# Patient Record
Sex: FEMALE | Race: WHITE | NOT HISPANIC OR LATINO | Employment: FULL TIME | URBAN - METROPOLITAN AREA
[De-identification: names, ages, dates, MRNs, and addresses within clinical notes are randomized per-mention and may not be internally consistent; named-entity substitution may affect disease eponyms.]

---

## 2017-12-24 ENCOUNTER — HOSPITAL ENCOUNTER (EMERGENCY)
Facility: HOSPITAL | Age: 52
Discharge: HOME | End: 2017-12-25
Attending: EMERGENCY MEDICINE | Admitting: EMERGENCY MEDICINE
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
## 2017-12-24 DIAGNOSIS — S80.11XA CONTUSION OF RIGHT TIBIA: ICD-10-CM

## 2017-12-24 DIAGNOSIS — S16.1XXA CERVICAL STRAIN, ACUTE, INITIAL ENCOUNTER: Primary | ICD-10-CM

## 2017-12-24 DIAGNOSIS — S29.019A ACUTE THORACIC MYOFASCIAL STRAIN, INITIAL ENCOUNTER: ICD-10-CM

## 2017-12-24 PROCEDURE — 99283 EMERGENCY DEPT VISIT LOW MDM: CPT

## 2017-12-24 RX ORDER — DESVENLAFAXINE 100 MG/1
100 TABLET, EXTENDED RELEASE ORAL EVERY MORNING
COMMUNITY

## 2017-12-25 ENCOUNTER — APPOINTMENT (OUTPATIENT)
Dept: GENERAL RADIOLOGY | Facility: HOSPITAL | Age: 52
End: 2017-12-25

## 2017-12-25 VITALS
SYSTOLIC BLOOD PRESSURE: 177 MMHG | HEIGHT: 65 IN | TEMPERATURE: 97.9 F | WEIGHT: 125 LBS | RESPIRATION RATE: 16 BRPM | OXYGEN SATURATION: 97 % | DIASTOLIC BLOOD PRESSURE: 111 MMHG | BODY MASS INDEX: 20.83 KG/M2 | HEART RATE: 92 BPM

## 2017-12-25 PROCEDURE — 72072 X-RAY EXAM THORAC SPINE 3VWS: CPT

## 2017-12-25 PROCEDURE — 72040 X-RAY EXAM NECK SPINE 2-3 VW: CPT

## 2022-08-30 ENCOUNTER — APPOINTMENT (OUTPATIENT)
Dept: CARDIOLOGY | Facility: HOSPITAL | Age: 57
End: 2022-08-30

## 2022-08-30 ENCOUNTER — APPOINTMENT (OUTPATIENT)
Dept: CT IMAGING | Facility: HOSPITAL | Age: 57
End: 2022-08-30

## 2022-08-30 ENCOUNTER — APPOINTMENT (OUTPATIENT)
Dept: GENERAL RADIOLOGY | Facility: HOSPITAL | Age: 57
End: 2022-08-30

## 2022-08-30 ENCOUNTER — HOSPITAL ENCOUNTER (INPATIENT)
Facility: HOSPITAL | Age: 57
LOS: 4 days | Discharge: HOME | End: 2022-09-03
Attending: EMERGENCY MEDICINE | Admitting: INTERNAL MEDICINE

## 2022-08-30 ENCOUNTER — APPOINTMENT (OUTPATIENT)
Dept: ULTRASOUND IMAGING | Facility: HOSPITAL | Age: 57
End: 2022-08-30

## 2022-08-30 DIAGNOSIS — R53.83 FATIGUE, UNSPECIFIED TYPE: ICD-10-CM

## 2022-08-30 DIAGNOSIS — R74.8 ELEVATED LIVER ENZYMES: ICD-10-CM

## 2022-08-30 DIAGNOSIS — I21.4 NSTEMI (NON-ST ELEVATED MYOCARDIAL INFARCTION): ICD-10-CM

## 2022-08-30 DIAGNOSIS — I50.23 ACUTE ON CHRONIC SYSTOLIC CONGESTIVE HEART FAILURE: Primary | ICD-10-CM

## 2022-08-30 DIAGNOSIS — I50.9 ACUTE CONGESTIVE HEART FAILURE, UNSPECIFIED HEART FAILURE TYPE: ICD-10-CM

## 2022-08-30 LAB
ALBUMIN SERPL-MCNC: 3.9 G/DL (ref 3.5–5.2)
ALBUMIN/GLOB SERPL: 1.4 G/DL
ALP SERPL-CCNC: 170 U/L (ref 39–117)
ALT SERPL W P-5'-P-CCNC: 42 U/L (ref 1–33)
ANION GAP SERPL CALCULATED.3IONS-SCNC: 15 MMOL/L (ref 5–15)
ANISOCYTOSIS BLD QL: NORMAL
ASCENDING AORTA: 2.5 CM
AST SERPL-CCNC: 75 U/L (ref 1–32)
B PARAPERT DNA SPEC QL NAA+PROBE: NOT DETECTED
B PERT DNA SPEC QL NAA+PROBE: NOT DETECTED
BASOPHILS # BLD AUTO: 0.16 10*3/MM3 (ref 0–0.2)
BASOPHILS NFR BLD AUTO: 0.9 % (ref 0–1.5)
BH CV ECHO MEAS - AI P1/2T: 466.1 MSEC
BH CV ECHO MEAS - AO ROOT DIAM: 2.6 CM
BH CV ECHO MEAS - EDV(CUBED): 227 ML
BH CV ECHO MEAS - EDV(MOD-SP2): 170 ML
BH CV ECHO MEAS - EDV(MOD-SP4): 175 ML
BH CV ECHO MEAS - EF(MOD-BP): 33.9 %
BH CV ECHO MEAS - EF(MOD-SP2): 31.8 %
BH CV ECHO MEAS - EF(MOD-SP4): 41.1 %
BH CV ECHO MEAS - ESV(CUBED): 166.4 ML
BH CV ECHO MEAS - ESV(MOD-SP2): 116 ML
BH CV ECHO MEAS - ESV(MOD-SP4): 103 ML
BH CV ECHO MEAS - FS: 9.8 %
BH CV ECHO MEAS - IVS/LVPW: 1 CM
BH CV ECHO MEAS - IVSD: 0.8 CM
BH CV ECHO MEAS - LA DIMENSION: 4.4 CM
BH CV ECHO MEAS - LAT PEAK E' VEL: 4.1 CM/SEC
BH CV ECHO MEAS - LV MASS(C)D: 191.6 GRAMS
BH CV ECHO MEAS - LV MAX PG: 1.5 MMHG
BH CV ECHO MEAS - LV MEAN PG: 1 MMHG
BH CV ECHO MEAS - LV V1 MAX: 61.3 CM/SEC
BH CV ECHO MEAS - LV V1 VTI: 8.5 CM
BH CV ECHO MEAS - LVIDD: 6.1 CM
BH CV ECHO MEAS - LVIDS: 5.5 CM
BH CV ECHO MEAS - LVOT AREA: 2.5 CM2
BH CV ECHO MEAS - LVOT DIAM: 1.8 CM
BH CV ECHO MEAS - LVPWD: 0.8 CM
BH CV ECHO MEAS - MED PEAK E' VEL: 3.2 CM/SEC
BH CV ECHO MEAS - MV A MAX VEL: 80.2 CM/SEC
BH CV ECHO MEAS - MV DEC SLOPE: 382 CM/SEC2
BH CV ECHO MEAS - MV E MAX VEL: 66 CM/SEC
BH CV ECHO MEAS - MV E/A: 0.82
BH CV ECHO MEAS - MV MAX PG: 2.41 MMHG
BH CV ECHO MEAS - MV MEAN PG: 1 MMHG
BH CV ECHO MEAS - MV P1/2T: 63.9 MSEC
BH CV ECHO MEAS - MV V2 VTI: 10.5 CM
BH CV ECHO MEAS - MVA(P1/2T): 3.4 CM2
BH CV ECHO MEAS - MVA(VTI): 2.05 CM2
BH CV ECHO MEAS - PA ACC TIME: 0.1 SEC
BH CV ECHO MEAS - PA PR(ACCEL): 36.3 MMHG
BH CV ECHO MEAS - PI END-D VEL: 168 CM/SEC
BH CV ECHO MEAS - SV(LVOT): 21.5 ML
BH CV ECHO MEAS - SV(MOD-SP2): 54 ML
BH CV ECHO MEAS - SV(MOD-SP4): 72 ML
BH CV ECHO MEAS - TAPSE (>1.6): 1.11 CM
BH CV ECHO MEASUREMENTS AVERAGE E/E' RATIO: 18.08
BH CV VAS BP LEFT ARM: NORMAL MMHG
BH CV XLRA - RV BASE: 4 CM
BH CV XLRA - RV LENGTH: 5.1 CM
BH CV XLRA - RV MID: 2.5 CM
BH CV XLRA - TDI S': 9.1 CM/SEC
BILIRUB SERPL-MCNC: 1.5 MG/DL (ref 0–1.2)
BILIRUB UR QL STRIP: NEGATIVE
BUN SERPL-MCNC: 13 MG/DL (ref 6–20)
BUN/CREAT SERPL: 11.3 (ref 7–25)
C PNEUM DNA NPH QL NAA+NON-PROBE: NOT DETECTED
CALCIUM SPEC-SCNC: 8.9 MG/DL (ref 8.6–10.5)
CHLORIDE SERPL-SCNC: 102 MMOL/L (ref 98–107)
CLARITY UR: CLEAR
CO2 SERPL-SCNC: 23 MMOL/L (ref 22–29)
COLOR UR: YELLOW
CREAT BLDA-MCNC: 1.2 MG/DL
CREAT BLDA-MCNC: 1.2 MG/DL (ref 0.6–1.3)
CREAT SERPL-MCNC: 1.15 MG/DL (ref 0.57–1)
D-LACTATE SERPL-SCNC: 1.8 MMOL/L (ref 0.5–2)
DEPRECATED RDW RBC AUTO: 60.1 FL (ref 37–54)
EGFRCR SERPLBLD CKD-EPI 2021: 55.7 ML/MIN/1.73
EOSINOPHIL # BLD AUTO: 0.19 10*3/MM3 (ref 0–0.4)
EOSINOPHIL NFR BLD AUTO: 1 % (ref 0.3–6.2)
ERYTHROCYTE [DISTWIDTH] IN BLOOD BY AUTOMATED COUNT: 20.6 % (ref 12.3–15.4)
FLUAV SUBTYP SPEC NAA+PROBE: NOT DETECTED
FLUBV RNA ISLT QL NAA+PROBE: NOT DETECTED
GLOBULIN UR ELPH-MCNC: 2.7 GM/DL
GLUCOSE SERPL-MCNC: 127 MG/DL (ref 65–99)
GLUCOSE UR STRIP-MCNC: NEGATIVE MG/DL
HADV DNA SPEC NAA+PROBE: NOT DETECTED
HCOV 229E RNA SPEC QL NAA+PROBE: NOT DETECTED
HCOV HKU1 RNA SPEC QL NAA+PROBE: NOT DETECTED
HCOV NL63 RNA SPEC QL NAA+PROBE: NOT DETECTED
HCOV OC43 RNA SPEC QL NAA+PROBE: NOT DETECTED
HCT VFR BLD AUTO: 40 % (ref 34–46.6)
HGB BLD-MCNC: 13.2 G/DL (ref 12–15.9)
HGB UR QL STRIP.AUTO: NEGATIVE
HMPV RNA NPH QL NAA+NON-PROBE: NOT DETECTED
HOLD SPECIMEN: NORMAL
HPIV1 RNA ISLT QL NAA+PROBE: NOT DETECTED
HPIV2 RNA SPEC QL NAA+PROBE: NOT DETECTED
HPIV3 RNA NPH QL NAA+PROBE: NOT DETECTED
HPIV4 P GENE NPH QL NAA+PROBE: NOT DETECTED
IMM GRANULOCYTES # BLD AUTO: 0.2 10*3/MM3 (ref 0–0.05)
IMM GRANULOCYTES NFR BLD AUTO: 1.1 % (ref 0–0.5)
KETONES UR QL STRIP: NEGATIVE
LEFT ATRIUM VOLUME INDEX: 38 ML/M2
LEUKOCYTE ESTERASE UR QL STRIP.AUTO: NEGATIVE
LV EF 2D ECHO EST: 25 %
LYMPHOCYTES # BLD AUTO: 2.01 10*3/MM3 (ref 0.7–3.1)
LYMPHOCYTES NFR BLD AUTO: 10.9 % (ref 19.6–45.3)
M PNEUMO IGG SER IA-ACNC: NOT DETECTED
MAXIMAL PREDICTED HEART RATE: 163 BPM
MCH RBC QN AUTO: 27.6 PG (ref 26.6–33)
MCHC RBC AUTO-ENTMCNC: 33 G/DL (ref 31.5–35.7)
MCV RBC AUTO: 83.5 FL (ref 79–97)
MONOCYTES # BLD AUTO: 1.11 10*3/MM3 (ref 0.1–0.9)
MONOCYTES NFR BLD AUTO: 6 % (ref 5–12)
NEUTROPHILS NFR BLD AUTO: 14.7 10*3/MM3 (ref 1.7–7)
NEUTROPHILS NFR BLD AUTO: 80.1 % (ref 42.7–76)
NITRITE UR QL STRIP: NEGATIVE
NRBC BLD AUTO-RTO: 1.1 /100 WBC (ref 0–0.2)
NT-PROBNP SERPL-MCNC: ABNORMAL PG/ML (ref 0–900)
OVALOCYTES BLD QL SMEAR: NORMAL
PH UR STRIP.AUTO: 6 [PH] (ref 5–8)
PLAT MORPH BLD: NORMAL
PLATELET # BLD AUTO: 504 10*3/MM3 (ref 140–450)
PMV BLD AUTO: 11.2 FL (ref 6–12)
POLYCHROMASIA BLD QL SMEAR: NORMAL
POTASSIUM SERPL-SCNC: 4.1 MMOL/L (ref 3.5–5.2)
PROCALCITONIN SERPL-MCNC: 0.05 NG/ML (ref 0–0.25)
PROT SERPL-MCNC: 6.6 G/DL (ref 6–8.5)
PROT UR QL STRIP: ABNORMAL
RBC # BLD AUTO: 4.79 10*6/MM3 (ref 3.77–5.28)
RHINOVIRUS RNA SPEC NAA+PROBE: NOT DETECTED
RSV RNA NPH QL NAA+NON-PROBE: NOT DETECTED
SARS-COV-2 RNA NPH QL NAA+NON-PROBE: NOT DETECTED
SODIUM SERPL-SCNC: 140 MMOL/L (ref 136–145)
SP GR UR STRIP: 1.04 (ref 1–1.03)
STRESS TARGET HR: 139 BPM
TROPONIN T SERPL-MCNC: 0.55 NG/ML (ref 0–0.03)
TSH SERPL DL<=0.05 MIU/L-ACNC: 3.53 UIU/ML (ref 0.27–4.2)
UROBILINOGEN UR QL STRIP: ABNORMAL
VIT B12 BLD-MCNC: 521 PG/ML (ref 211–946)
WBC MORPH BLD: NORMAL
WBC NRBC COR # BLD: 18.37 10*3/MM3 (ref 3.4–10.8)
WHOLE BLOOD HOLD COAG: NORMAL
WHOLE BLOOD HOLD SPECIMEN: NORMAL

## 2022-08-30 PROCEDURE — 99223 1ST HOSP IP/OBS HIGH 75: CPT | Performed by: INTERNAL MEDICINE

## 2022-08-30 PROCEDURE — 82607 VITAMIN B-12: CPT | Performed by: INTERNAL MEDICINE

## 2022-08-30 PROCEDURE — 84484 ASSAY OF TROPONIN QUANT: CPT

## 2022-08-30 PROCEDURE — 76705 ECHO EXAM OF ABDOMEN: CPT

## 2022-08-30 PROCEDURE — 84145 PROCALCITONIN (PCT): CPT | Performed by: EMERGENCY MEDICINE

## 2022-08-30 PROCEDURE — 74177 CT ABD & PELVIS W/CONTRAST: CPT

## 2022-08-30 PROCEDURE — 81003 URINALYSIS AUTO W/O SCOPE: CPT | Performed by: INTERNAL MEDICINE

## 2022-08-30 PROCEDURE — 99222 1ST HOSP IP/OBS MODERATE 55: CPT | Performed by: INTERNAL MEDICINE

## 2022-08-30 PROCEDURE — 0 IOPAMIDOL PER 1 ML: Performed by: EMERGENCY MEDICINE

## 2022-08-30 PROCEDURE — 93005 ELECTROCARDIOGRAM TRACING: CPT

## 2022-08-30 PROCEDURE — 93306 TTE W/DOPPLER COMPLETE: CPT | Performed by: INTERNAL MEDICINE

## 2022-08-30 PROCEDURE — 82565 ASSAY OF CREATININE: CPT

## 2022-08-30 PROCEDURE — 87040 BLOOD CULTURE FOR BACTERIA: CPT | Performed by: EMERGENCY MEDICINE

## 2022-08-30 PROCEDURE — 85007 BL SMEAR W/DIFF WBC COUNT: CPT

## 2022-08-30 PROCEDURE — 76981 USE PARENCHYMA: CPT

## 2022-08-30 PROCEDURE — 83605 ASSAY OF LACTIC ACID: CPT | Performed by: EMERGENCY MEDICINE

## 2022-08-30 PROCEDURE — 0202U NFCT DS 22 TRGT SARS-COV-2: CPT | Performed by: EMERGENCY MEDICINE

## 2022-08-30 PROCEDURE — 25010000002 SULFUR HEXAFLUORIDE MICROSPH 60.7-25 MG RECONSTITUTED SUSPENSION: Performed by: NURSE PRACTITIONER

## 2022-08-30 PROCEDURE — 93306 TTE W/DOPPLER COMPLETE: CPT

## 2022-08-30 PROCEDURE — 71275 CT ANGIOGRAPHY CHEST: CPT

## 2022-08-30 PROCEDURE — 83880 ASSAY OF NATRIURETIC PEPTIDE: CPT

## 2022-08-30 PROCEDURE — 71045 X-RAY EXAM CHEST 1 VIEW: CPT

## 2022-08-30 PROCEDURE — 80050 GENERAL HEALTH PANEL: CPT

## 2022-08-30 PROCEDURE — 99285 EMERGENCY DEPT VISIT HI MDM: CPT

## 2022-08-30 RX ORDER — NICOTINE 21 MG/24HR
1 PATCH, TRANSDERMAL 24 HOURS TRANSDERMAL DAILY
Status: DISCONTINUED | OUTPATIENT
Start: 2022-08-30 | End: 2022-09-03 | Stop reason: HOSPADM

## 2022-08-30 RX ORDER — VALSARTAN 80 MG/1
80 TABLET ORAL
Status: DISCONTINUED | OUTPATIENT
Start: 2022-08-30 | End: 2022-08-30

## 2022-08-30 RX ORDER — BUMETANIDE 0.25 MG/ML
1 INJECTION INTRAMUSCULAR; INTRAVENOUS EVERY 12 HOURS
Status: DISCONTINUED | OUTPATIENT
Start: 2022-08-30 | End: 2022-09-02

## 2022-08-30 RX ORDER — SODIUM CHLORIDE 0.9 % (FLUSH) 0.9 %
10 SYRINGE (ML) INJECTION AS NEEDED
Status: DISCONTINUED | OUTPATIENT
Start: 2022-08-30 | End: 2022-09-03 | Stop reason: HOSPADM

## 2022-08-30 RX ORDER — VALSARTAN 80 MG/1
40 TABLET ORAL EVERY 12 HOURS SCHEDULED
Status: DISCONTINUED | OUTPATIENT
Start: 2022-08-30 | End: 2022-09-03 | Stop reason: HOSPADM

## 2022-08-30 RX ORDER — SODIUM CHLORIDE 0.9 % (FLUSH) 0.9 %
10 SYRINGE (ML) INJECTION EVERY 12 HOURS SCHEDULED
Status: DISCONTINUED | OUTPATIENT
Start: 2022-08-30 | End: 2022-09-03 | Stop reason: HOSPADM

## 2022-08-30 RX ORDER — ENOXAPARIN SODIUM 100 MG/ML
40 INJECTION SUBCUTANEOUS DAILY
Status: DISCONTINUED | OUTPATIENT
Start: 2022-08-30 | End: 2022-09-03

## 2022-08-30 RX ORDER — IBUPROFEN 200 MG
400 TABLET ORAL EVERY 6 HOURS PRN
COMMUNITY
End: 2022-09-03 | Stop reason: HOSPADM

## 2022-08-30 RX ORDER — DESVENLAFAXINE SUCCINATE 50 MG/1
50 TABLET, EXTENDED RELEASE ORAL DAILY
Status: DISCONTINUED | OUTPATIENT
Start: 2022-08-30 | End: 2022-09-03 | Stop reason: HOSPADM

## 2022-08-30 RX ORDER — CARVEDILOL 6.25 MG/1
6.25 TABLET ORAL 2 TIMES DAILY WITH MEALS
Status: DISCONTINUED | OUTPATIENT
Start: 2022-08-30 | End: 2022-09-03 | Stop reason: HOSPADM

## 2022-08-30 RX ORDER — ACETAMINOPHEN 325 MG/1
650 TABLET ORAL EVERY 8 HOURS PRN
Status: DISCONTINUED | OUTPATIENT
Start: 2022-08-30 | End: 2022-09-03 | Stop reason: HOSPADM

## 2022-08-30 RX ORDER — DICLOFENAC SODIUM 75 MG/1
75 TABLET, DELAYED RELEASE ORAL 2 TIMES DAILY
COMMUNITY
End: 2022-09-03 | Stop reason: HOSPADM

## 2022-08-30 RX ORDER — METOLAZONE 5 MG/1
5 TABLET ORAL ONCE
Status: COMPLETED | OUTPATIENT
Start: 2022-08-30 | End: 2022-08-30

## 2022-08-30 RX ADMIN — CARVEDILOL 6.25 MG: 6.25 TABLET, FILM COATED ORAL at 18:16

## 2022-08-30 RX ADMIN — IOPAMIDOL 85 ML: 755 INJECTION, SOLUTION INTRAVENOUS at 08:38

## 2022-08-30 RX ADMIN — METOLAZONE 5 MG: 5 TABLET ORAL at 11:37

## 2022-08-30 RX ADMIN — BUMETANIDE 1 MG: 0.25 INJECTION INTRAMUSCULAR; INTRAVENOUS at 21:07

## 2022-08-30 RX ADMIN — SULFUR HEXAFLUORIDE 3 ML: KIT at 12:24

## 2022-08-30 RX ADMIN — Medication 1 PATCH: at 13:32

## 2022-08-30 RX ADMIN — VALSARTAN 40 MG: 80 TABLET, FILM COATED ORAL at 12:20

## 2022-08-30 RX ADMIN — Medication 10 ML: at 21:07

## 2022-08-30 RX ADMIN — BUMETANIDE 1 MG: 0.25 INJECTION INTRAMUSCULAR; INTRAVENOUS at 11:37

## 2022-08-30 RX ADMIN — CARVEDILOL 6.25 MG: 6.25 TABLET, FILM COATED ORAL at 11:37

## 2022-08-30 RX ADMIN — Medication 10 ML: at 12:21

## 2022-08-31 ENCOUNTER — APPOINTMENT (OUTPATIENT)
Dept: GENERAL RADIOLOGY | Facility: HOSPITAL | Age: 57
End: 2022-08-31

## 2022-08-31 PROBLEM — I50.41 ACUTE COMBINED SYSTOLIC (CONGESTIVE) AND DIASTOLIC (CONGESTIVE) HEART FAILURE: Status: ACTIVE | Noted: 2022-08-31

## 2022-08-31 PROBLEM — R79.89 ELEVATED TROPONIN: Status: ACTIVE | Noted: 2022-08-31

## 2022-08-31 PROBLEM — R77.8 ELEVATED TROPONIN: Status: ACTIVE | Noted: 2022-08-31

## 2022-08-31 PROBLEM — N28.9 RENAL INSUFFICIENCY: Status: ACTIVE | Noted: 2022-08-31

## 2022-08-31 PROBLEM — R63.4 UNINTENTIONAL WEIGHT LOSS: Status: ACTIVE | Noted: 2022-08-31

## 2022-08-31 PROBLEM — R79.89 ELEVATED LFTS: Status: ACTIVE | Noted: 2022-08-31

## 2022-08-31 PROBLEM — Z72.0 TOBACCO USE: Status: ACTIVE | Noted: 2022-08-31

## 2022-08-31 PROBLEM — D72.829 LEUKOCYTOSIS: Status: ACTIVE | Noted: 2022-08-31

## 2022-08-31 LAB
ALBUMIN SERPL-MCNC: 3.4 G/DL (ref 3.5–5.2)
ALBUMIN/GLOB SERPL: 1.4 G/DL
ALP SERPL-CCNC: 147 U/L (ref 39–117)
ALT SERPL W P-5'-P-CCNC: 37 U/L (ref 1–33)
ANION GAP SERPL CALCULATED.3IONS-SCNC: 9 MMOL/L (ref 5–15)
AST SERPL-CCNC: 90 U/L (ref 1–32)
BILIRUB SERPL-MCNC: 0.9 MG/DL (ref 0–1.2)
BUN SERPL-MCNC: 18 MG/DL (ref 6–20)
BUN/CREAT SERPL: 13.6 (ref 7–25)
CALCIUM SPEC-SCNC: 8.4 MG/DL (ref 8.6–10.5)
CHLORIDE SERPL-SCNC: 101 MMOL/L (ref 98–107)
CHOLEST SERPL-MCNC: 112 MG/DL (ref 0–200)
CO2 SERPL-SCNC: 33 MMOL/L (ref 22–29)
CREAT SERPL-MCNC: 1.32 MG/DL (ref 0.57–1)
DEPRECATED RDW RBC AUTO: 59.1 FL (ref 37–54)
EGFRCR SERPLBLD CKD-EPI 2021: 47.2 ML/MIN/1.73
ERYTHROCYTE [DISTWIDTH] IN BLOOD BY AUTOMATED COUNT: 20.6 % (ref 12.3–15.4)
GLOBULIN UR ELPH-MCNC: 2.4 GM/DL
GLUCOSE SERPL-MCNC: 107 MG/DL (ref 65–99)
HBA1C MFR BLD: 5.7 % (ref 4.8–5.6)
HCT VFR BLD AUTO: 38.6 % (ref 34–46.6)
HDLC SERPL-MCNC: 30 MG/DL (ref 40–60)
HGB BLD-MCNC: 12.4 G/DL (ref 12–15.9)
LDLC SERPL CALC-MCNC: 66 MG/DL (ref 0–100)
LDLC/HDLC SERPL: 2.19 {RATIO}
MAGNESIUM SERPL-MCNC: 1.7 MG/DL (ref 1.6–2.6)
MAGNESIUM SERPL-MCNC: 1.8 MG/DL (ref 1.6–2.6)
MCH RBC QN AUTO: 26.8 PG (ref 26.6–33)
MCHC RBC AUTO-ENTMCNC: 32.1 G/DL (ref 31.5–35.7)
MCV RBC AUTO: 83.5 FL (ref 79–97)
PLATELET # BLD AUTO: 403 10*3/MM3 (ref 140–450)
PMV BLD AUTO: 11.6 FL (ref 6–12)
POTASSIUM SERPL-SCNC: 3.4 MMOL/L (ref 3.5–5.2)
PROT SERPL-MCNC: 5.8 G/DL (ref 6–8.5)
QT INTERVAL: 472 MS
QTC INTERVAL: 490 MS
RBC # BLD AUTO: 4.62 10*6/MM3 (ref 3.77–5.28)
SODIUM SERPL-SCNC: 143 MMOL/L (ref 136–145)
TRIGL SERPL-MCNC: 82 MG/DL (ref 0–150)
TROPONIN T SERPL-MCNC: 0.98 NG/ML (ref 0–0.03)
TSH SERPL DL<=0.05 MIU/L-ACNC: 1.38 UIU/ML (ref 0.27–4.2)
VLDLC SERPL-MCNC: 16 MG/DL (ref 5–40)
WBC NRBC COR # BLD: 17.27 10*3/MM3 (ref 3.4–10.8)

## 2022-08-31 PROCEDURE — 80050 GENERAL HEALTH PANEL: CPT | Performed by: INTERNAL MEDICINE

## 2022-08-31 PROCEDURE — 83036 HEMOGLOBIN GLYCOSYLATED A1C: CPT | Performed by: INTERNAL MEDICINE

## 2022-08-31 PROCEDURE — 83735 ASSAY OF MAGNESIUM: CPT | Performed by: INTERNAL MEDICINE

## 2022-08-31 PROCEDURE — 93005 ELECTROCARDIOGRAM TRACING: CPT | Performed by: NURSE PRACTITIONER

## 2022-08-31 PROCEDURE — 80061 LIPID PANEL: CPT | Performed by: INTERNAL MEDICINE

## 2022-08-31 PROCEDURE — 84484 ASSAY OF TROPONIN QUANT: CPT | Performed by: NURSE PRACTITIONER

## 2022-08-31 PROCEDURE — 99232 SBSQ HOSP IP/OBS MODERATE 35: CPT | Performed by: INTERNAL MEDICINE

## 2022-08-31 PROCEDURE — 83735 ASSAY OF MAGNESIUM: CPT | Performed by: NURSE PRACTITIONER

## 2022-08-31 PROCEDURE — 25010000002 ENOXAPARIN PER 10 MG: Performed by: INTERNAL MEDICINE

## 2022-08-31 PROCEDURE — 71045 X-RAY EXAM CHEST 1 VIEW: CPT

## 2022-08-31 PROCEDURE — 0 MAGNESIUM SULFATE 4 GM/100ML SOLUTION: Performed by: INTERNAL MEDICINE

## 2022-08-31 RX ORDER — COLCHICINE 0.6 MG/1
0.6 TABLET ORAL ONCE
Status: COMPLETED | OUTPATIENT
Start: 2022-08-31 | End: 2022-08-31

## 2022-08-31 RX ORDER — POTASSIUM CHLORIDE 750 MG/1
40 CAPSULE, EXTENDED RELEASE ORAL AS NEEDED
Status: DISCONTINUED | OUTPATIENT
Start: 2022-08-31 | End: 2022-09-03 | Stop reason: HOSPADM

## 2022-08-31 RX ORDER — COLCHICINE 0.6 MG/1
1.2 TABLET ORAL ONCE
Status: DISCONTINUED | OUTPATIENT
Start: 2022-08-31 | End: 2022-08-31

## 2022-08-31 RX ORDER — POTASSIUM CHLORIDE 1.5 G/1.77G
40 POWDER, FOR SOLUTION ORAL AS NEEDED
Status: DISCONTINUED | OUTPATIENT
Start: 2022-08-31 | End: 2022-09-03 | Stop reason: HOSPADM

## 2022-08-31 RX ORDER — MAGNESIUM SULFATE HEPTAHYDRATE 40 MG/ML
2 INJECTION, SOLUTION INTRAVENOUS AS NEEDED
Status: DISCONTINUED | OUTPATIENT
Start: 2022-08-31 | End: 2022-09-03 | Stop reason: HOSPADM

## 2022-08-31 RX ORDER — COLCHICINE 0.6 MG/1
0.6 TABLET ORAL EVERY 12 HOURS SCHEDULED
Status: DISCONTINUED | OUTPATIENT
Start: 2022-09-01 | End: 2022-08-31

## 2022-08-31 RX ORDER — MAGNESIUM SULFATE HEPTAHYDRATE 40 MG/ML
4 INJECTION, SOLUTION INTRAVENOUS AS NEEDED
Status: DISCONTINUED | OUTPATIENT
Start: 2022-08-31 | End: 2022-09-03 | Stop reason: HOSPADM

## 2022-08-31 RX ADMIN — ENOXAPARIN SODIUM 40 MG: 40 INJECTION SUBCUTANEOUS at 10:22

## 2022-08-31 RX ADMIN — BUMETANIDE 1 MG: 0.25 INJECTION INTRAMUSCULAR; INTRAVENOUS at 20:43

## 2022-08-31 RX ADMIN — ACETAMINOPHEN 650 MG: 325 TABLET, FILM COATED ORAL at 16:20

## 2022-08-31 RX ADMIN — Medication 10 ML: at 20:44

## 2022-08-31 RX ADMIN — MAGNESIUM SULFATE HEPTAHYDRATE 4 G: 40 INJECTION, SOLUTION INTRAVENOUS at 16:21

## 2022-08-31 RX ADMIN — POTASSIUM CHLORIDE 40 MEQ: 750 CAPSULE, EXTENDED RELEASE ORAL at 10:21

## 2022-08-31 RX ADMIN — CARVEDILOL 6.25 MG: 6.25 TABLET, FILM COATED ORAL at 16:21

## 2022-08-31 RX ADMIN — CARVEDILOL 6.25 MG: 6.25 TABLET, FILM COATED ORAL at 10:21

## 2022-08-31 RX ADMIN — DESVENLAFAXINE 50 MG: 50 TABLET, EXTENDED RELEASE ORAL at 10:21

## 2022-08-31 RX ADMIN — Medication 1 PATCH: at 10:24

## 2022-08-31 RX ADMIN — POTASSIUM CHLORIDE 40 MEQ: 750 CAPSULE, EXTENDED RELEASE ORAL at 16:21

## 2022-08-31 RX ADMIN — VALSARTAN 40 MG: 80 TABLET, FILM COATED ORAL at 10:21

## 2022-08-31 RX ADMIN — Medication 10 ML: at 10:21

## 2022-08-31 RX ADMIN — BUMETANIDE 1 MG: 0.25 INJECTION INTRAMUSCULAR; INTRAVENOUS at 10:22

## 2022-08-31 RX ADMIN — COLCHICINE 0.6 MG: 0.6 TABLET, FILM COATED ORAL at 16:21

## 2022-09-01 ENCOUNTER — APPOINTMENT (OUTPATIENT)
Dept: MRI IMAGING | Facility: HOSPITAL | Age: 57
End: 2022-09-01

## 2022-09-01 PROBLEM — I50.23 ACUTE ON CHRONIC SYSTOLIC CONGESTIVE HEART FAILURE: Status: ACTIVE | Noted: 2022-08-30

## 2022-09-01 LAB
ACT BLD: 237 SECONDS (ref 82–152)
ALBUMIN SERPL-MCNC: 3.7 G/DL (ref 3.5–5.2)
ALBUMIN/GLOB SERPL: 1.5 G/DL
ALP SERPL-CCNC: 144 U/L (ref 39–117)
ALT SERPL W P-5'-P-CCNC: 33 U/L (ref 1–33)
ANION GAP SERPL CALCULATED.3IONS-SCNC: 12 MMOL/L (ref 5–15)
AST SERPL-CCNC: 54 U/L (ref 1–32)
BASOPHILS # BLD AUTO: 0.13 10*3/MM3 (ref 0–0.2)
BASOPHILS NFR BLD AUTO: 0.8 % (ref 0–1.5)
BILIRUB SERPL-MCNC: 1 MG/DL (ref 0–1.2)
BUN SERPL-MCNC: 23 MG/DL (ref 6–20)
BUN/CREAT SERPL: 18.7 (ref 7–25)
CALCIUM SPEC-SCNC: 8.6 MG/DL (ref 8.6–10.5)
CHLORIDE SERPL-SCNC: 97 MMOL/L (ref 98–107)
CO2 SERPL-SCNC: 33 MMOL/L (ref 22–29)
CREAT SERPL-MCNC: 1.23 MG/DL (ref 0.57–1)
DEPRECATED RDW RBC AUTO: 57.1 FL (ref 37–54)
EGFRCR SERPLBLD CKD-EPI 2021: 51.4 ML/MIN/1.73
EOSINOPHIL # BLD AUTO: 0.27 10*3/MM3 (ref 0–0.4)
EOSINOPHIL NFR BLD AUTO: 1.7 % (ref 0.3–6.2)
ERYTHROCYTE [DISTWIDTH] IN BLOOD BY AUTOMATED COUNT: 20.3 % (ref 12.3–15.4)
GLOBULIN UR ELPH-MCNC: 2.5 GM/DL
GLUCOSE SERPL-MCNC: 99 MG/DL (ref 65–99)
HCT VFR BLD AUTO: 36.8 % (ref 34–46.6)
HGB BLD-MCNC: 12.3 G/DL (ref 12–15.9)
IMM GRANULOCYTES # BLD AUTO: 0.15 10*3/MM3 (ref 0–0.05)
IMM GRANULOCYTES NFR BLD AUTO: 0.9 % (ref 0–0.5)
LYMPHOCYTES # BLD AUTO: 2.4 10*3/MM3 (ref 0.7–3.1)
LYMPHOCYTES NFR BLD AUTO: 15.2 % (ref 19.6–45.3)
MAGNESIUM SERPL-MCNC: 1.6 MG/DL (ref 1.6–2.6)
MCH RBC QN AUTO: 27.2 PG (ref 26.6–33)
MCHC RBC AUTO-ENTMCNC: 33.4 G/DL (ref 31.5–35.7)
MCV RBC AUTO: 81.4 FL (ref 79–97)
MONOCYTES # BLD AUTO: 1.03 10*3/MM3 (ref 0.1–0.9)
MONOCYTES NFR BLD AUTO: 6.5 % (ref 5–12)
NEUTROPHILS NFR BLD AUTO: 11.84 10*3/MM3 (ref 1.7–7)
NEUTROPHILS NFR BLD AUTO: 74.9 % (ref 42.7–76)
NRBC BLD AUTO-RTO: 0 /100 WBC (ref 0–0.2)
PHOSPHATE SERPL-MCNC: 3.5 MG/DL (ref 2.5–4.5)
PLATELET # BLD AUTO: 333 10*3/MM3 (ref 140–450)
PMV BLD AUTO: 10.7 FL (ref 6–12)
POTASSIUM SERPL-SCNC: 3.6 MMOL/L (ref 3.5–5.2)
PROT SERPL-MCNC: 6.2 G/DL (ref 6–8.5)
QT INTERVAL: 418 MS
QTC INTERVAL: 457 MS
RBC # BLD AUTO: 4.52 10*6/MM3 (ref 3.77–5.28)
SODIUM SERPL-SCNC: 142 MMOL/L (ref 136–145)
WBC NRBC COR # BLD: 15.82 10*3/MM3 (ref 3.4–10.8)

## 2022-09-01 PROCEDURE — 80053 COMPREHEN METABOLIC PANEL: CPT | Performed by: INTERNAL MEDICINE

## 2022-09-01 PROCEDURE — 84100 ASSAY OF PHOSPHORUS: CPT | Performed by: INTERNAL MEDICINE

## 2022-09-01 PROCEDURE — 99232 SBSQ HOSP IP/OBS MODERATE 35: CPT | Performed by: STUDENT IN AN ORGANIZED HEALTH CARE EDUCATION/TRAINING PROGRAM

## 2022-09-01 PROCEDURE — 93571 IV DOP VEL&/PRESS C FLO 1ST: CPT | Performed by: INTERNAL MEDICINE

## 2022-09-01 PROCEDURE — C1894 INTRO/SHEATH, NON-LASER: HCPCS | Performed by: INTERNAL MEDICINE

## 2022-09-01 PROCEDURE — 85025 COMPLETE CBC W/AUTO DIFF WBC: CPT | Performed by: INTERNAL MEDICINE

## 2022-09-01 PROCEDURE — 93005 ELECTROCARDIOGRAM TRACING: CPT | Performed by: INTERNAL MEDICINE

## 2022-09-01 PROCEDURE — C1887 CATHETER, GUIDING: HCPCS | Performed by: INTERNAL MEDICINE

## 2022-09-01 PROCEDURE — B2151ZZ FLUOROSCOPY OF LEFT HEART USING LOW OSMOLAR CONTRAST: ICD-10-PCS | Performed by: INTERNAL MEDICINE

## 2022-09-01 PROCEDURE — 0 IOPAMIDOL PER 1 ML: Performed by: INTERNAL MEDICINE

## 2022-09-01 PROCEDURE — 99232 SBSQ HOSP IP/OBS MODERATE 35: CPT | Performed by: INTERNAL MEDICINE

## 2022-09-01 PROCEDURE — C1769 GUIDE WIRE: HCPCS | Performed by: INTERNAL MEDICINE

## 2022-09-01 PROCEDURE — 93458 L HRT ARTERY/VENTRICLE ANGIO: CPT | Performed by: INTERNAL MEDICINE

## 2022-09-01 PROCEDURE — 4A023N7 MEASUREMENT OF CARDIAC SAMPLING AND PRESSURE, LEFT HEART, PERCUTANEOUS APPROACH: ICD-10-PCS | Performed by: INTERNAL MEDICINE

## 2022-09-01 PROCEDURE — 25010000002 MIDAZOLAM PER 1 MG: Performed by: INTERNAL MEDICINE

## 2022-09-01 PROCEDURE — B2111ZZ FLUOROSCOPY OF MULTIPLE CORONARY ARTERIES USING LOW OSMOLAR CONTRAST: ICD-10-PCS | Performed by: INTERNAL MEDICINE

## 2022-09-01 PROCEDURE — 74181 MRI ABDOMEN W/O CONTRAST: CPT

## 2022-09-01 PROCEDURE — 83735 ASSAY OF MAGNESIUM: CPT | Performed by: INTERNAL MEDICINE

## 2022-09-01 PROCEDURE — 25010000002 FENTANYL CITRATE (PF) 50 MCG/ML SOLUTION: Performed by: INTERNAL MEDICINE

## 2022-09-01 PROCEDURE — 85347 COAGULATION TIME ACTIVATED: CPT

## 2022-09-01 PROCEDURE — 0 MAGNESIUM SULFATE 4 GM/100ML SOLUTION: Performed by: INTERNAL MEDICINE

## 2022-09-01 PROCEDURE — 25010000002 HEPARIN (PORCINE) PER 1000 UNITS: Performed by: INTERNAL MEDICINE

## 2022-09-01 RX ORDER — ACETAMINOPHEN 325 MG/1
650 TABLET ORAL EVERY 4 HOURS PRN
Status: DISCONTINUED | OUTPATIENT
Start: 2022-09-01 | End: 2022-09-03 | Stop reason: HOSPADM

## 2022-09-01 RX ORDER — NICARDIPINE HCL-0.9% SOD CHLOR 1 MG/10 ML
SYRINGE (ML) INTRAVENOUS AS NEEDED
Status: DISCONTINUED | OUTPATIENT
Start: 2022-09-01 | End: 2022-09-01 | Stop reason: HOSPADM

## 2022-09-01 RX ORDER — FENTANYL CITRATE 50 UG/ML
INJECTION, SOLUTION INTRAMUSCULAR; INTRAVENOUS AS NEEDED
Status: DISCONTINUED | OUTPATIENT
Start: 2022-09-01 | End: 2022-09-01 | Stop reason: HOSPADM

## 2022-09-01 RX ORDER — ONDANSETRON 2 MG/ML
4 INJECTION INTRAMUSCULAR; INTRAVENOUS EVERY 6 HOURS PRN
Status: DISCONTINUED | OUTPATIENT
Start: 2022-09-01 | End: 2022-09-03 | Stop reason: HOSPADM

## 2022-09-01 RX ORDER — MIDAZOLAM HYDROCHLORIDE 1 MG/ML
INJECTION INTRAMUSCULAR; INTRAVENOUS AS NEEDED
Status: DISCONTINUED | OUTPATIENT
Start: 2022-09-01 | End: 2022-09-01 | Stop reason: HOSPADM

## 2022-09-01 RX ORDER — LIDOCAINE HYDROCHLORIDE 10 MG/ML
INJECTION, SOLUTION EPIDURAL; INFILTRATION; INTRACAUDAL; PERINEURAL AS NEEDED
Status: DISCONTINUED | OUTPATIENT
Start: 2022-09-01 | End: 2022-09-01 | Stop reason: HOSPADM

## 2022-09-01 RX ORDER — ASPIRIN 81 MG/1
81 TABLET ORAL DAILY
Status: DISCONTINUED | OUTPATIENT
Start: 2022-09-02 | End: 2022-09-03 | Stop reason: HOSPADM

## 2022-09-01 RX ORDER — HEPARIN SODIUM 1000 [USP'U]/ML
INJECTION, SOLUTION INTRAVENOUS; SUBCUTANEOUS AS NEEDED
Status: DISCONTINUED | OUTPATIENT
Start: 2022-09-01 | End: 2022-09-01 | Stop reason: HOSPADM

## 2022-09-01 RX ORDER — NITROGLYCERIN 0.4 MG/1
0.4 TABLET SUBLINGUAL
Status: DISCONTINUED | OUTPATIENT
Start: 2022-09-01 | End: 2022-09-03 | Stop reason: HOSPADM

## 2022-09-01 RX ORDER — ASPIRIN 81 MG/1
324 TABLET, CHEWABLE ORAL ONCE
Status: COMPLETED | OUTPATIENT
Start: 2022-09-01 | End: 2022-09-01

## 2022-09-01 RX ORDER — SODIUM CHLORIDE 9 MG/ML
100 INJECTION, SOLUTION INTRAVENOUS CONTINUOUS
Status: ACTIVE | OUTPATIENT
Start: 2022-09-01 | End: 2022-09-01

## 2022-09-01 RX ADMIN — CARVEDILOL 6.25 MG: 6.25 TABLET, FILM COATED ORAL at 17:51

## 2022-09-01 RX ADMIN — BUMETANIDE 1 MG: 0.25 INJECTION INTRAMUSCULAR; INTRAVENOUS at 09:19

## 2022-09-01 RX ADMIN — Medication 10 ML: at 09:21

## 2022-09-01 RX ADMIN — VALSARTAN 40 MG: 80 TABLET, FILM COATED ORAL at 09:19

## 2022-09-01 RX ADMIN — BUMETANIDE 1 MG: 0.25 INJECTION INTRAMUSCULAR; INTRAVENOUS at 21:59

## 2022-09-01 RX ADMIN — MAGNESIUM SULFATE HEPTAHYDRATE 4 G: 40 INJECTION, SOLUTION INTRAVENOUS at 09:19

## 2022-09-01 RX ADMIN — ASPIRIN 81 MG 324 MG: 81 TABLET ORAL at 17:52

## 2022-09-01 RX ADMIN — DESVENLAFAXINE 50 MG: 50 TABLET, EXTENDED RELEASE ORAL at 09:20

## 2022-09-01 RX ADMIN — Medication 1 PATCH: at 09:20

## 2022-09-01 RX ADMIN — Medication 10 ML: at 22:06

## 2022-09-01 RX ADMIN — SODIUM CHLORIDE 100 ML/HR: 9 INJECTION, SOLUTION INTRAVENOUS at 17:52

## 2022-09-01 RX ADMIN — CARVEDILOL 6.25 MG: 6.25 TABLET, FILM COATED ORAL at 09:19

## 2022-09-01 NOTE — PROGRESS NOTES
Nae Duval [facility]  [id number]  1965   LOS: 2 days   Patient Care Team:  Mounika Chatman APRN as PCP - General (Family Medicine)    Chief Complaint:  HFrEF / ELEVATED TROPONONS / TOBACCO / WEIGHT LOSS / ABNORMAL LFTs    Subjective     Comfortable semisupine off supplemental oxygen therapy (room air oximetry 85%).  Easily awaken with flat somber affect.  No current complaints of anginal type chest discomfort, increased tachypnea/dyspnea, nausea, emesis, abdominal pain, headache, or focal motor-sensory change.  Breakfast tray at table not opened and removed per my request.    Objective     Vital Sign Min/Max for last 24 hours  Temp  Min: 97.8 °F (36.6 °C)  Max: 98.2 °F (36.8 °C)   BP  Min: 90/77  Max: 111/75   Pulse  Min: 70  Max: 82   Resp  Min: 16  Max: 18   SpO2  Min: 91 %  Max: 93 %      Weight  Min: 65 kg (143 lb 4.8 oz)  Max: 65 kg (143 lb 4.8 oz)         08/30/22  1222 09/01/22  0609   Weight: 66.7 kg (147 lb) 65 kg (143 lb 4.8 oz)       No intake or output data in the 24 hours ending 09/01/22 0756    Physical Exam:     General Appearance:    Alert, cooperative, in no acute distress   Lungs:    Bibasilar crackles,respirations regular, even and                   unlabored    Heart:    Regular and normal rate, normal S1 and S2, grade 1/6 systolic murmur, no gallop, no rub, no click   Abdomen:  Extremities:   Soft, non-tender, bowel sounds audible x4  1+ edema, normal range of motion   Pulses:   Pulses palpable and equal bilaterally      Results Review:   Results from last 7 days   Lab Units 08/31/22  0518 08/30/22  0748 08/30/22  0746 08/30/22  0737   SODIUM mmol/L 143  --   --  140   POTASSIUM mmol/L 3.4*  --   --  4.1   CHLORIDE mmol/L 101  --   --  102   CO2 mmol/L 33.0*  --   --  23.0   BUN mg/dL 18  --   --  13   CREATININE mg/dL 1.32* 1.20 1.20 1.15*   GLUCOSE mg/dL 107*  --   --  127*   CALCIUM mg/dL 8.4*  --   --  8.9     Results from last 7 days   Lab Units 08/31/22  0518 08/30/22  0737   WBC  10*3/mm3 17.27* 18.37*   HEMOGLOBIN g/dL 12.4 13.2   HEMATOCRIT % 38.6 40.0   PLATELETS 10*3/mm3 403 504*     Results from last 7 days   Lab Units 08/31/22  0518   CHOLESTEROL mg/dL 112   TRIGLYCERIDES mg/dL 82   HDL CHOL mg/dL 30*   LDL CHOL mg/dL 66     Results from last 7 days   Lab Units 08/31/22  0518   HEMOGLOBIN A1C % 5.70*     Results from last 7 days   Lab Units 08/31/22  0518 08/30/22  0737   TROPONIN T ng/mL 0.983* 0.551*     · MRI ABDOMEN WO CONTRAST: PENDING    · NO NEW CXR / EKG / LAB RESULTS.    Medication Review: REVIEWED; NO DRIPS.    Assessment & Plan     Marginal hemodynamics and oxygenation.  No reassessment of GFR available to review this morning.  We will plan diagnostic coronary angiography to assess for significant focal obstructive coronary artery disease to help plan for long-term therapy.  Would continue current cardiac medications with hold parameters as outlined on valsartan.      Acute combined systolic (congestive) and diastolic (congestive) heart failure (HCC)    Leukocytosis    Elevated LFTs    Renal insufficiency    Tobacco use    Unintentional weight loss    Elevated troponin    09/01/22  07:56 EDT

## 2022-09-01 NOTE — PROGRESS NOTES
[facility] Medicine Services  PROGRESS NOTE    Patient Name: Nae Sanchez  : 1965  MRN: [id number]    Date of Admission: 2022  Primary Care Physician: Mounika Chatman APRN    Subjective   Subjective     CC:  F/U CHF    HPI:  No acute overnight events, sleepy this AM    ROS:  Gen- No fevers  CV- No chest pain  Resp- No dyspnea  GI- No nausea/vomiting/diarrhea/abdominal pain      Objective   Objective     Vital Signs:   Temp:  [97.9 °F (36.6 °C)-98.2 °F (36.8 °C)] 97.9 °F (36.6 °C)  Heart Rate:  [70-82] 78  Resp:  [16-18] 18  BP: ()/(65-77) 111/75  Flow (L/min):  [2] 2     Physical Exam:  Constitutional: No acute distress, drowsy  HENT: NCAT, mucous membranes moist  Respiratory: Respiratory effort normal on room air  Cardiovascular: RRR, strongly palpable pulse left foot  Musculoskeletal: 1+ bilateral ankle edema  Psychiatric: Appropriate affect, cooperative  Neurologic: aaox3, no focal deficits  Skin: No rashes on exposed surfaces    Results Reviewed:  LAB RESULTS:      Lab 22  0518 22  0737   WBC 17.27* 18.37*   HEMOGLOBIN 12.4 13.2   HEMATOCRIT 38.6 40.0   PLATELETS 403 504*   NEUTROS ABS  --  14.70*   IMMATURE GRANS (ABS)  --  0.20*   LYMPHS ABS  --  2.01   MONOS ABS  --  1.11*   EOS ABS  --  0.19   MCV 83.5 83.5   PROCALCITONIN  --  0.05   LACTATE  --  1.8         Lab 22  1639 22  0518 22  0748 22  0746 22  0737   SODIUM  --  143  --   --  140   POTASSIUM  --  3.4*  --   --  4.1   CHLORIDE  --  101  --   --  102   CO2  --  33.0*  --   --  23.0   ANION GAP  --  9.0  --   --  15.0   BUN  --  18  --   --  13   CREATININE  --  1.32* 1.20 1.20 1.15*   EGFR  --  47.2*  --   --  55.7*   GLUCOSE  --  107*  --   --  127*   CALCIUM  --  8.4*  --   --  8.9   MAGNESIUM 1.7 1.8  --   --   --    HEMOGLOBIN A1C  --  5.70*  --   --   --    TSH  --  1.380  --   --  3.530         Lab 22  0518 22  0737   TOTAL PROTEIN 5.8* 6.6    ALBUMIN 3.40* 3.90   GLOBULIN 2.4 2.7   ALT (SGPT) 37* 42*   AST (SGOT) 90* 75*   BILIRUBIN 0.9 1.5*   ALK PHOS 147* 170*         Lab 08/31/22 0518 08/30/22  0737   PROBNP  --  49,288.0*   TROPONIN T 0.983* 0.551*         Lab 08/31/22 0518   CHOLESTEROL 112   LDL CHOL 66   HDL CHOL 30*   TRIGLYCERIDES 82         Lab 08/30/22  0737   VITAMIN B 12 521         Brief Urine Lab Results  (Last result in the past 365 days)      Color   Clarity   Blood   Leuk Est   Nitrite   Protein   CREAT   Urine HCG        08/30/22 1037 Yellow   Clear   Negative   Negative   Negative   Trace                 Microbiology Results Abnormal     Procedure Component Value - Date/Time    Blood Culture - Blood, Arm, Right [[id number]]  (Normal) Collected: 08/30/22 0915    Lab Status: Preliminary result Specimen: Blood from Arm, Right Updated: 08/31/22 1018     Blood Culture No growth at 24 hours    Blood Culture - Blood, Hand, Right [[id number]]  (Normal) Collected: 08/30/22 0915    Lab Status: Preliminary result Specimen: Blood from Hand, Right Updated: 08/31/22 1018     Blood Culture No growth at 24 hours    COVID PRE-OP / PRE-PROCEDURE SCREENING ORDER (NO ISOLATION) - Swab, Nasopharynx [[id number]]  (Normal) Collected: 08/30/22 0918    Lab Status: Final result Specimen: Swab from Nasopharynx Updated: 08/30/22 1046    Narrative:      The following orders were created for panel order COVID PRE-OP / PRE-PROCEDURE SCREENING ORDER (NO ISOLATION) - Swab, Nasopharynx.  Procedure                               Abnormality         Status                     ---------                               -----------         ------                     Respiratory Panel PCR w/...[[id number]]  Normal              Final result                 Please view results for these tests on the individual orders.    Respiratory Panel PCR w/COVID-19(SARS-CoV-2) SIDNEY/GERA/CAMORN/PAD/COR/MAD/[facility]LALI In-House, NP Swab in UTM/VTM, 3-4 HR TAT - Swab, Nasopharynx [[id number]]  (Normal)  Collected: 08/30/22 0918    Lab Status: Final result Specimen: Swab from Nasopharynx Updated: 08/30/22 1046     ADENOVIRUS, PCR Not Detected     Coronavirus 229E Not Detected     Coronavirus HKU1 Not Detected     Coronavirus NL63 Not Detected     Coronavirus OC43 Not Detected     COVID19 Not Detected     Human Metapneumovirus Not Detected     Human Rhinovirus/Enterovirus Not Detected     Influenza A PCR Not Detected     Influenza B PCR Not Detected     Parainfluenza Virus 1 Not Detected     Parainfluenza Virus 2 Not Detected     Parainfluenza Virus 3 Not Detected     Parainfluenza Virus 4 Not Detected     RSV, PCR Not Detected     Bordetella pertussis pcr Not Detected     Bordetella parapertussis PCR Not Detected     Chlamydophila pneumoniae PCR Not Detected     Mycoplasma pneumo by PCR Not Detected    Narrative:      In the setting of a positive respiratory panel with a viral infection PLUS a negative procalcitonin without other underlying concern for bacterial infection, consider observing off antibiotics or discontinuation of antibiotics and continue supportive care. If the respiratory panel is positive for atypical bacterial infection (Bordetella pertussis, Chlamydophila pneumoniae, or Mycoplasma pneumoniae), consider antibiotic de-escalation to target atypical bacterial infection.          Adult Transthoracic Echo Complete W/ Cont if Necessary Per Protocol    Result Date: 8/30/2022  · Estimated left ventricular EF = 25% Left ventricular ejection fraction appears to be 21 - 25%. Left ventricular systolic function is severely decreased. · There is a small left pleural effusion. · The following left ventricular wall segments are hypokinetic: mid anterior, apical anterior, basal anterolateral, mid anterolateral, apical lateral, basal inferolateral, mid inferolateral, mid anteroseptal and basal anterior. The following left ventricular wall segments are akinetic: apical inferior, basal inferior, mid inferior, apical  septal, basal inferoseptal, mid inferoseptal and apex. · The left ventricular cavity is mild to moderately dilated. · The findings are consistent with dilated cardiomyopathy. · Left ventricular diastolic function is consistent with (grade I) impaired relaxation. · Left atrial volume is mildly increased. · Estimated right ventricular systolic pressure from tricuspid regurgitation is normal (<35 mmHg). · Normal right ventricular cavity size, wall thickness and septal motion noted with moderately reduced right ventricular systolic function. · No evidence of left ventricular thrombus or mass present. · There is no evidence of pericardial effusion. · No evidence of pulmonary hypertension is present. · Mild to moderate aortic valve regurgitation is present.      US Liver    Result Date: 8/30/2022  US LIVER-  Date of Exam: 8/30/2022 2:30 PM  Indication: ascites, elevated LFTS, weight loss.  Comparison Exams: None available.  Technique: Multiple sonographic images of the right upper quadrant of the abdomen were obtained in the transverse and longitudinal planes.  FINDINGS: Limited images of the pancreas are unremarkable.   The visualized portions of the liver parenchyma are homogenous. No intrahepatic mass lesions identified. Hepatic vasculature appears normal.  Gallbladder is within normal limits in size. No gallstones are seen. There are low level internal echoes within the dependent portion of the gallbladder compatible sludge.  There is no gallbladder wall thickening or pericholecystic fluid.  The common hepatic duct is enlarged measuring 7 mm.  No intrahepatic biliary ductal dilatation identified.  The right kidney measures 10.2 cm in [id number] length. There is normal thickness and normal echogenicity of the renal parenchyma. There is no hydronephrosis  Point shear wave elastography (pSWE) of the liver was performed using Siemens software/hardware.  Reference Guidelines from: Juan C DAVIS, Demarco SR, Nader TERRELL, et al.  Update to the Society of Radiologists in Ultrasound Liver Elastography Consensus Statement. Radiology. Volume 296:Number2-August 2020; p.263-274   US ELASTOGRAPHY: 10 shear wave speed measurements were obtained using a right intercostal approach. The IQR/Median was 0.25 which is less than 0.3  The median liver shear wave speed is 1.43 m/s. This is considered within normal limits..  NOTE: In some patients with NAFLD, the cutoff values for compensated advanced chronic liver disease may be lower and follow up or additional testing would be recommended with those between 1.5 and 1.7 m/s.  Several conditions can increase liver stiffness unrelated to liver fibrosis including acute hepatitis, liver inflammation, ALT levels >5x normal, obstructive cholestasis, hepatic congestion, and infiltrative liver diseases such as amyloidosis, lymphoma, or extramedullary hematopoiesis. In all these conditions, however, stiffness values within the normal range exclude significant fibrosis.      Impression: 1. Median liver shear wave speed is 1.43 m/s. This is considered within normal limits. 2. Mild enlargement of common bile duct measuring up 7 mm.  No intrahepatic biliary ductal dilatation identified.  Consider MRCP or ERCP to evaluate for distal common bile duct obstruction       IMPRESSION:   This report was finalized on 8/30/2022 6:28 PM by Scott Rogel MD.      XR Chest 1 View    Result Date: 8/31/2022  Examination: XR CHEST 1 VW-  Date of Exam: 8/31/2022 12:56 AM  Indication: CHF.  Comparison: Radiograph 08/30/2022  Technique: 1 view of the chest  Findings: Mild patchy airspace changes present within the lung bases bilaterally. Small right-sided pleural effusion present. Small left-sided pleural effusion present.. No pneumothorax. The heart is enlarged, stable as compared to the previous study. The pulmonary vasculature appears mildly indistinct. No acute osseous abnormality identified.      Impression: Interval improvement in  bibasilar airspace disease likely related to atelectasis or pneumonia. Probable mild pulmonary edema pattern with small bilateral pleural effusions, also likely mildly improved.  This report was finalized on 8/31/2022 7:18 AM by Nicole Miguel MD.      MRI abdomen wo contrast mrcp    Result Date: 9/1/2022  MRI ABDOMEN WO CONTRAST MRCP-  Date of Exam: 9/1/2022 5:45 AM  Indication: Dilated CBD, elevated LFTs; I50.23-Acute on chronic systolic (congestive) heart failure.  Comparison: Liver ultrasound 8/30/2022. CT abdomen and pelvis 8/30/2022.  Technique:  Multiplanar multisequence MR images of the abdomen was performed without contrast. MRCP was performed.  FINDINGS:  The liver size is within normal limits. The liver does not appear grossly cirrhotic or steatotic. Limited evaluation of solid organ parenchyma due to lack of IV contrast. No focal liver lesion is identified.  There is layering material dependently in the gallbladder favored to represent gallbladder sludge. The gallbladder wall does not appear grossly thickened.  There is no abnormal intrahepatic biliary ductal dilation. The common bile duct demonstrates mild fusiform dilation up to 7 mm. No CBD suspicious stricture or choledocholithiasis is identified. There is focal severe narrowing at the level of the ampulla.  The pancreatic duct caliber is normal. There is no indication of pancreatic divisum morphology.  The spleen, pancreas, adrenals, and kidneys have a normal noncontrast appearance. The bowel does not appear overly thickened, dilated or inflamed.       Impression:  1. CBD demonstrates mild fusiform dilation up to 7 mm, which may support represent a normal variant for this patient. There is no abnormal intrahepatic biliary dilation. 2. There is focal tapered narrowing at the level of the ampulla. Mild ampullary stenosis is not excluded. No suspicious high-grade CBD stricture is seen. 3. No choledocholithiasis is seen. 4. Small gallbladder sludge.    This report was finalized on 9/1/2022 8:25 AM by Bharati Luong MD.        Results for orders placed during the hospital encounter of 08/30/22    Adult Transthoracic Echo Complete W/ Cont if Necessary Per Protocol    Interpretation Summary  · Estimated left ventricular EF = 25% Left ventricular ejection fraction appears to be 21 - 25%. Left ventricular systolic function is severely decreased.  · There is a small left pleural effusion.  · The following left ventricular wall segments are hypokinetic: mid anterior, apical anterior, basal anterolateral, mid anterolateral, apical lateral, basal inferolateral, mid inferolateral, mid anteroseptal and basal anterior. The following left ventricular wall segments are akinetic: apical inferior, basal inferior, mid inferior, apical septal, basal inferoseptal, mid inferoseptal and apex.  · The left ventricular cavity is mild to moderately dilated.  · The findings are consistent with dilated cardiomyopathy.  · Left ventricular diastolic function is consistent with (grade I) impaired relaxation.  · Left atrial volume is mildly increased.  · Estimated right ventricular systolic pressure from tricuspid regurgitation is normal (<35 mmHg).  · Normal right ventricular cavity size, wall thickness and septal motion noted with moderately reduced right ventricular systolic function.  · No evidence of left ventricular thrombus or mass present.  · There is no evidence of pericardial effusion.  · No evidence of pulmonary hypertension is present.  · Mild to moderate aortic valve regurgitation is present.      I have reviewed the medications:  Scheduled Meds:bumetanide, 1 mg, Intravenous, Q12H  carvedilol, 6.25 mg, Oral, BID With Meals  desvenlafaxine, 50 mg, Oral, Daily  enoxaparin, 40 mg, Subcutaneous, Daily  lisdexamfetamine, 60 mg, Oral, Daily  nicotine, 1 patch, Transdermal, Daily  pharmacy consult - MTM, , Does not apply, Daily  sodium chloride, 10 mL, Intravenous, Q12H  valsartan, 40 mg,  Oral, Q12H      Continuous Infusions:   PRN Meds:.•  acetaminophen  •  magnesium sulfate **OR** magnesium sulfate **OR** magnesium sulfate  •  potassium chloride  •  potassium chloride  •  sodium chloride  •  sodium chloride    Assessment & Plan   Assessment & Plan     Active Hospital Problems    Diagnosis  POA   • **Acute combined systolic (congestive) and diastolic (congestive) heart failure (HCC) [I50.41]  Yes   • Leukocytosis [D72.829]  Yes   • Elevated LFTs [R79.89]  Yes   • Renal insufficiency [N28.9]  Yes   • Tobacco use [Z72.0]  Yes   • Unintentional weight loss [R63.4]  Yes   • Elevated troponin [R77.8]  Yes      Resolved Hospital Problems   No resolved problems to display.        Brief Hospital Course to date:  Nae Sanchez is a 57 y.o. female with tobacco use who presented due to dyspnea and fatigue and was found to have new congestive heart failure.    This patient's problems and plans were partially entered by my partner and updated as appropriate by me 09/01/22.    Acute combined systolic and diastolic CHF  Elevated troponin  - Echo with LVEF 25%, diastolic dysfunction and multiple areas of wall motion abnormalities  - Cardiology following  -LHC per cardiology , planned for today  - Continue IV bumex, PO carvedilol, valsartan     Leukocytosis, improving  - UA not suggestive of infection  - CT chest/abdomen/pelvis without source of infection     LFT elevation  - Possibly due to CHF  - Mild enlargement of CBD on ultrasound-will obtain MRCP (no abnormal intrahepatic biliary dilation, no high grade stricture, no choledocholithiasis, small sludge)  -improving w diuresis    Left great toe pain  - Possibly gout-trial colchicine     Poor appetite, weight loss  - TSH WNL  - Nutrition consult     Renal insufficiency, acuity unknown  - Monitor with diuresis, improving     Tobacco use  - NRT if desired    Expected Discharge Location and Transportation: Home  Expected Discharge Date: 9/2    DVT  prophylaxis:  Medical DVT prophylaxis orders are present.          CODE STATUS:   Code Status and Medical Interventions:   Ordered at: 08/30/22 0942     Level Of Support Discussed With:    Patient     Code Status (Patient has no pulse and is not breathing):    CPR (Attempt to Resuscitate)     Medical Interventions (Patient has pulse or is breathing):    Full Support       Meg Vang MD  09/01/22

## 2022-09-02 ENCOUNTER — TELEPHONE (OUTPATIENT)
Dept: FAMILY MEDICINE CLINIC | Facility: CLINIC | Age: 57
End: 2022-09-02

## 2022-09-02 ENCOUNTER — READMISSION MANAGEMENT (OUTPATIENT)
Dept: CALL CENTER | Facility: HOSPITAL | Age: 57
End: 2022-09-02

## 2022-09-02 LAB
ALBUMIN SERPL-MCNC: 3.1 G/DL (ref 3.5–5.2)
ALBUMIN/GLOB SERPL: 1.1 G/DL
ALP SERPL-CCNC: 134 U/L (ref 39–117)
ALT SERPL W P-5'-P-CCNC: 26 U/L (ref 1–33)
ANION GAP SERPL CALCULATED.3IONS-SCNC: 11 MMOL/L (ref 5–15)
ANISOCYTOSIS BLD QL: NORMAL
AST SERPL-CCNC: 37 U/L (ref 1–32)
BASOPHILS # BLD AUTO: 0.11 10*3/MM3 (ref 0–0.2)
BASOPHILS NFR BLD AUTO: 0.9 % (ref 0–1.5)
BILIRUB SERPL-MCNC: 0.7 MG/DL (ref 0–1.2)
BUN SERPL-MCNC: 23 MG/DL (ref 6–20)
BUN/CREAT SERPL: 16.3 (ref 7–25)
CALCIUM SPEC-SCNC: 8.1 MG/DL (ref 8.6–10.5)
CHLORIDE SERPL-SCNC: 94 MMOL/L (ref 98–107)
CO2 SERPL-SCNC: 32 MMOL/L (ref 22–29)
CREAT SERPL-MCNC: 1.41 MG/DL (ref 0.57–1)
DEPRECATED RDW RBC AUTO: 60 FL (ref 37–54)
EGFRCR SERPLBLD CKD-EPI 2021: 43.6 ML/MIN/1.73
EOSINOPHIL # BLD AUTO: 0.21 10*3/MM3 (ref 0–0.4)
EOSINOPHIL NFR BLD AUTO: 1.7 % (ref 0.3–6.2)
ERYTHROCYTE [DISTWIDTH] IN BLOOD BY AUTOMATED COUNT: 20.1 % (ref 12.3–15.4)
GLOBULIN UR ELPH-MCNC: 2.7 GM/DL
GLUCOSE SERPL-MCNC: 123 MG/DL (ref 65–99)
HCT VFR BLD AUTO: 36.4 % (ref 34–46.6)
HGB BLD-MCNC: 11.7 G/DL (ref 12–15.9)
IMM GRANULOCYTES # BLD AUTO: 0.12 10*3/MM3 (ref 0–0.05)
IMM GRANULOCYTES NFR BLD AUTO: 1 % (ref 0–0.5)
LYMPHOCYTES # BLD AUTO: 2.31 10*3/MM3 (ref 0.7–3.1)
LYMPHOCYTES NFR BLD AUTO: 18.7 % (ref 19.6–45.3)
MAGNESIUM SERPL-MCNC: 1.6 MG/DL (ref 1.6–2.6)
MCH RBC QN AUTO: 27.1 PG (ref 26.6–33)
MCHC RBC AUTO-ENTMCNC: 32.1 G/DL (ref 31.5–35.7)
MCV RBC AUTO: 84.3 FL (ref 79–97)
MONOCYTES # BLD AUTO: 0.9 10*3/MM3 (ref 0.1–0.9)
MONOCYTES NFR BLD AUTO: 7.3 % (ref 5–12)
NEUTROPHILS NFR BLD AUTO: 70.4 % (ref 42.7–76)
NEUTROPHILS NFR BLD AUTO: 8.71 10*3/MM3 (ref 1.7–7)
NRBC BLD AUTO-RTO: 0 /100 WBC (ref 0–0.2)
PLAT MORPH BLD: NORMAL
PLATELET # BLD AUTO: 347 10*3/MM3 (ref 140–450)
PMV BLD AUTO: 11.8 FL (ref 6–12)
POTASSIUM SERPL-SCNC: 3.5 MMOL/L (ref 3.5–5.2)
PROT SERPL-MCNC: 5.8 G/DL (ref 6–8.5)
QT INTERVAL: 362 MS
QTC INTERVAL: 469 MS
RBC # BLD AUTO: 4.32 10*6/MM3 (ref 3.77–5.28)
SODIUM SERPL-SCNC: 137 MMOL/L (ref 136–145)
WBC MORPH BLD: NORMAL
WBC NRBC COR # BLD: 12.36 10*3/MM3 (ref 3.4–10.8)

## 2022-09-02 PROCEDURE — 99232 SBSQ HOSP IP/OBS MODERATE 35: CPT | Performed by: INTERNAL MEDICINE

## 2022-09-02 PROCEDURE — 0 MAGNESIUM SULFATE 4 GM/100ML SOLUTION: Performed by: INTERNAL MEDICINE

## 2022-09-02 PROCEDURE — 80053 COMPREHEN METABOLIC PANEL: CPT | Performed by: INTERNAL MEDICINE

## 2022-09-02 PROCEDURE — 85025 COMPLETE CBC W/AUTO DIFF WBC: CPT | Performed by: INTERNAL MEDICINE

## 2022-09-02 PROCEDURE — 85007 BL SMEAR W/DIFF WBC COUNT: CPT | Performed by: INTERNAL MEDICINE

## 2022-09-02 PROCEDURE — 83735 ASSAY OF MAGNESIUM: CPT | Performed by: INTERNAL MEDICINE

## 2022-09-02 PROCEDURE — 25010000002 ENOXAPARIN PER 10 MG: Performed by: INTERNAL MEDICINE

## 2022-09-02 RX ORDER — TORSEMIDE 10 MG/1
10 TABLET ORAL DAILY
Status: DISCONTINUED | OUTPATIENT
Start: 2022-09-02 | End: 2022-09-03 | Stop reason: HOSPADM

## 2022-09-02 RX ADMIN — Medication 10 ML: at 22:06

## 2022-09-02 RX ADMIN — ENOXAPARIN SODIUM 40 MG: 40 INJECTION SUBCUTANEOUS at 08:53

## 2022-09-02 RX ADMIN — POTASSIUM CHLORIDE 40 MEQ: 750 CAPSULE, EXTENDED RELEASE ORAL at 10:35

## 2022-09-02 RX ADMIN — DESVENLAFAXINE 50 MG: 50 TABLET, EXTENDED RELEASE ORAL at 08:51

## 2022-09-02 RX ADMIN — Medication 1 PATCH: at 08:50

## 2022-09-02 RX ADMIN — VALSARTAN 40 MG: 80 TABLET, FILM COATED ORAL at 22:06

## 2022-09-02 RX ADMIN — LISDEXAMFETAMINE DIMESYLATE 60 MG: 60 CAPSULE ORAL at 10:30

## 2022-09-02 RX ADMIN — CARVEDILOL 6.25 MG: 6.25 TABLET, FILM COATED ORAL at 08:51

## 2022-09-02 RX ADMIN — MAGNESIUM SULFATE HEPTAHYDRATE 4 G: 40 INJECTION, SOLUTION INTRAVENOUS at 05:54

## 2022-09-02 RX ADMIN — POTASSIUM CHLORIDE 40 MEQ: 750 CAPSULE, EXTENDED RELEASE ORAL at 05:52

## 2022-09-02 RX ADMIN — VALSARTAN 40 MG: 80 TABLET, FILM COATED ORAL at 08:50

## 2022-09-02 RX ADMIN — ASPIRIN 81 MG: 81 TABLET, COATED ORAL at 08:50

## 2022-09-02 NOTE — NURSING NOTE
Pt had right radial heart cath. The pt has positive pulse motor function and sensory. The pt denied numbness or tingling below site. No hematoma or  bruising

## 2022-09-02 NOTE — OUTREACH NOTE
Prep Survey    Flowsheet Row Responses   [facility] patient discharged from? [place]   Is LACE score < 7 ? No   Emergency Room discharge w/ pulse ox? No   Eligibility [facility]   Date of Admission 08/30/22   Date of Discharge 09/03/22   Discharge Disposition Home or Self Care   Does the patient have one of the following disease processes/diagnoses(primary or secondary)? CHF   Does the patient have Home health ordered? No   Is there a DME ordered? No   Prep survey completed? Yes          ANDREW HOYT - Registered Nurse

## 2022-09-02 NOTE — CASE MANAGEMENT/SOCIAL WORK
Continued Stay Note   Luda     Patient Name: Nae Sanchez  MRN: [id number]  Today's Date: 9/2/2022    Admit Date: 8/30/2022     Discharge Plan     Row Name 09/02/22 1057       Plan    Plan Comments  PCP arranged and in AVS.    Final Discharge Disposition Code 01 - home or self-care    Row Name 09/02/22 0834       Plan    Final Discharge Disposition Code 01 - home or self-care               Discharge Codes    No documentation.               Expected Discharge Date and Time     Expected Discharge Date Expected Discharge Time    Sep 3, 2022             Nae Tejada RN

## 2022-09-02 NOTE — TELEPHONE ENCOUNTER
Caller: HONEY    Relationship to patient: Other    Best call back number: [phone]    New or established patient?  [x] New  [] Established    Date of discharge: 9/3/22    Facility discharged from: [facility]    Diagnosis/Symptoms: CONGESTIVE HEART FAILURE    Length of stay (If applicable): N/A    Specialty Only: Did you see a [facility] provider?    [] Yes  [] No  If so, who?

## 2022-09-02 NOTE — PROGRESS NOTES
Nae Duval [facility]  [id number]  1965   LOS: 3 days   Patient Care Team:  Mounika Chatman APRN as PCP - General (Family Medicine)    Chief Complaint:  HFrEF / ELEVATED TROPONONS / TOBACCO / WEIGHT LOSS / ABNORMAL LFTs    Subjective     Comfortable semirecumbent in bed off supplemental oxygen therapy.  No anginal type chest discomfort, increased tachypnea/dyspnea, nausea, emesis, abdominal pain, headache, or focal motor-sensory changes.  More animated and conversant.  No difficulty with right radial artery catheterization site.  No current complaints.    Objective     Vital Sign Min/Max for last 24 hours  Temp  Min: 97.3 °F (36.3 °C)  Max: 98.1 °F (36.7 °C)   BP  Min: 90/51  Max: 123/73   Pulse  Min: 60  Max: 75   Resp  Min: 16  Max: 20   SpO2  Min: 90 %  Max: 100 %      Weight  Min: 61.8 kg (136 lb 3.2 oz)  Max: 61.8 kg (136 lb 3.2 oz)         09/01/22  0609 09/01/22  1649   Weight: 65 kg (143 lb 4.8 oz) 61.8 kg (136 lb 3.2 oz)         Intake/Output Summary (Last 24 hours) at 9/2/2022 0818  Last data filed at 9/2/2022 0300  Gross per 24 hour   Intake 1000 ml   Output 600 ml   Net 400 ml       Physical Exam:     General Appearance:    Alert, cooperative, in no acute distress   Lungs:     Clear to auscultation,respirations regular, even and                   unlabored    Heart:    Regular and normal rate, normal S1 and S2, grade 1/6 systolic murmur, no gallop, no rub, no click   Abdomen:  Extremities:   Soft, non-tender, bowel sounds audible x4    No edema, normal range of motion   Pulses:   Pulses palpable and equal bilaterally      Results Review:   Results from last 7 days   Lab Units 09/02/22  0347 09/01/22  1008 08/31/22  0518   SODIUM mmol/L 137 142 143   POTASSIUM mmol/L 3.5 3.6 3.4*   CHLORIDE mmol/L 94* 97* 101   CO2 mmol/L 32.0* 33.0* 33.0*   BUN mg/dL 23* 23* 18   CREATININE mg/dL 1.41* 1.23* 1.32*   GLUCOSE mg/dL 123* 99 107*   CALCIUM mg/dL 8.1* 8.6 8.4*     Results from last 7 days   Lab Units  09/02/22  0347 09/01/22  1008 08/31/22  0518   WBC 10*3/mm3 12.36* 15.82* 17.27*   HEMOGLOBIN g/dL 11.7* 12.3 12.4   HEMATOCRIT % 36.4 36.8 38.6   PLATELETS 10*3/mm3 347 333 403     Results from last 7 days   Lab Units 08/31/22  0518   CHOLESTEROL mg/dL 112   TRIGLYCERIDES mg/dL 82   HDL CHOL mg/dL 30*   LDL CHOL mg/dL 66     Results from last 7 days   Lab Units 08/31/22  0518   HEMOGLOBIN A1C % 5.70*     Results from last 7 days   Lab Units 08/31/22  0518 08/30/22  0737   TROPONIN T ng/mL 0.983* 0.551*     · LHC:    FINAL IMPRESSION:  · Angulated/tortuous proximal circumflex with nonocclusive (up to 50%) plaque with normal IFR of 1.0.  · Angiographically near normal left main, the LAD and the dominant RCA.  · Dilated, probably nonischemic cardiomyopathy with severe left ventricular systolic dysfunction, estimated ejection fraction 30%.  · Left ventricular apical filling defect highly suggestive of apical thrombus.     RECOMMENDATIONS:  · Optimize medical therapy and risk factor management per guidelines.  · Consideration of transesophageal echocardiogram for further assessment of left ventricular apical filling defect and consideration of long-term anticoagulation therapy.      · ALBUMIN: 3.10    · NO CXR / EKG.    Medication Review: REVIEWED; NO DRIPS.    Assessment & Plan     Transthoracic echocardiographic images performed earlier in the week reviewed again with additional colleague and neither of us feel she has an apical thrombus at this time.  Nevertheless would consider empiric Xarelto 20 mg p.m. meal daily until LV function improves and would defer transesophageal echocardiogram at this time.  She will need reassessment of her systolic left ventricular function in the next few months and this can be done with [facility] to once again assess for apical thrombus.  Renal function and hemodynamics marginal.  Would continue to mobilize and observe closely and reassess renal function in a.m.  She will need close  follow-up and monitoring with [facility] and follow-up with [facility] in 4-6 weeks.    Discussed with patient and parents in room.      Acute combined systolic (congestive) and diastolic (congestive) heart failure (HCC)    Leukocytosis    Elevated LFTs    Renal insufficiency    Tobacco use    Unintentional weight loss    Elevated troponin    Acute on chronic systolic congestive heart failure (HCC)    09/02/22  08:18 EDT

## 2022-09-02 NOTE — NURSING NOTE
Pt. Referred for Phase II Cardiac Rehab. Staff discussed benefits of exercise, program protocol, and educational material provided. Teach back verified.  Patient to call if interested in participating.

## 2022-09-02 NOTE — PLAN OF CARE
Goal Outcome Evaluation:  Plan of Care Reviewed With: patient           Outcome Evaluation: Patient alert, oriented x 4, respiration WNL on continuous O2 via nasal catheter at 2L/min. Patient made no complaints. TR band was  in place to right wrist, same discontinued (total of 12 ml of air removed; 2 ml every 15 minutes). No swelling or bleeding noted at site. Patient made no report of numbness or tingling, capillary refill less than 3 seconds. Patient up to bathroom with cane and  assistance x 1. Continuous Sodium chloride 0.9% infusion in progress at 100 ml/ hr.

## 2022-09-02 NOTE — PROGRESS NOTES
[facility] Medicine Services  PROGRESS NOTE    Patient Name: Nae Sanchez  : 1965  MRN: [id number]    Date of Admission: 2022  Primary Care Physician: Mounika Chatman APRN    Subjective   Subjective     CC:  CHF    HPI:  Patient denies any abdominal pain or shortness of breath.  Evaluated.  Has no complaints.    ROS:  General: denies fevers or chills  CV: denies chest pain  Resp: denies shortness of breath  Abd: denies abd pain, nausea      Objective   Objective     Vital Signs:   Temp:  [97.6 °F (36.4 °C)-98.1 °F (36.7 °C)] 97.9 °F (36.6 °C)  Heart Rate:  [60-81] 81  Resp:  [16-20] 16  BP: ()/(51-83) 118/83  Flow (L/min):  [1.5-2] 1.5     Physical Exam:  Constitutional: No acute distress, awake, alert, chronically ill female  Respiratory: Clear to auscultation bilaterally, respiratory effort normal on room air  Cardiovascular: RRR, no murmurs, rubs, or gallops  Gastrointestinal: Positive bowel sounds, soft, nontender, nondistended  Musculoskeletal: No bilateral ankle edema  Psychiatric: Appropriate affect, cooperative  Neurologic: Oriented x 3, no focal neurological deficits        Results Reviewed:  LAB RESULTS:      Lab 22  1008 22  0518 22  0737   WBC 12.36* 15.82* 17.27* 18.37*   HEMOGLOBIN 11.7* 12.3 12.4 13.2   HEMATOCRIT 36.4 36.8 38.6 40.0   PLATELETS 347 333 403 504*   NEUTROS ABS 8.71* 11.84*  --  14.70*   IMMATURE GRANS (ABS) 0.12* 0.15*  --  0.20*   LYMPHS ABS 2.31 2.40  --  2.01   MONOS ABS 0.90 1.03*  --  1.11*   EOS ABS 0.21 0.27  --  0.19   MCV 84.3 81.4 83.5 83.5   PROCALCITONIN  --   --   --  0.05   LACTATE  --   --   --  1.8         Lab 22  0347 22  1008 22  1639 22  0518 22  0748 22  0746 22  0737   SODIUM 137 142  --  143  --   --  140   POTASSIUM 3.5 3.6  --  3.4*  --   --  4.1   CHLORIDE 94* 97*  --  101  --   --  102   CO2 32.0* 33.0*  --  33.0*  --   --  23.0    ANION GAP 11.0 12.0  --  9.0  --   --  15.0   BUN 23* 23*  --  18  --   --  13   CREATININE 1.41* 1.23*  --  1.32* 1.20 1.20 1.15*   EGFR 43.6* 51.4*  --  47.2*  --   --  55.7*   GLUCOSE 123* 99  --  107*  --   --  127*   CALCIUM 8.1* 8.6  --  8.4*  --   --  8.9   MAGNESIUM 1.6 1.6 1.7 1.8  --   --   --    PHOSPHORUS  --  3.5  --   --   --   --   --    HEMOGLOBIN A1C  --   --   --  5.70*  --   --   --    TSH  --   --   --  1.380  --   --  3.530         Lab 09/02/22  0347 09/01/22  1008 08/31/22  0518 08/30/22  0737   TOTAL PROTEIN 5.8* 6.2 5.8* 6.6   ALBUMIN 3.10* 3.70 3.40* 3.90   GLOBULIN 2.7 2.5 2.4 2.7   ALT (SGPT) 26 33 37* 42*   AST (SGOT) 37* 54* 90* 75*   BILIRUBIN 0.7 1.0 0.9 1.5*   ALK PHOS 134* 144* 147* 170*         Lab 08/31/22  0518 08/30/22  0737   PROBNP  --  49,288.0*   TROPONIN T 0.983* 0.551*         Lab 08/31/22  0518   CHOLESTEROL 112   LDL CHOL 66   HDL CHOL 30*   TRIGLYCERIDES 82         Lab 08/30/22  0737   VITAMIN B 12 521         Brief Urine Lab Results  (Last result in the past 365 days)      Color   Clarity   Blood   Leuk Est   Nitrite   Protein   CREAT   Urine HCG        08/30/22 1037 Yellow   Clear   Negative   Negative   Negative   Trace                 Microbiology Results Abnormal     Procedure Component Value - Date/Time    Blood Culture - Blood, Arm, Right [[id number]]  (Normal) Collected: 08/30/22 0915    Lab Status: Preliminary result Specimen: Blood from Arm, Right Updated: 09/01/22 1016     Blood Culture No growth at 2 days    Blood Culture - Blood, Hand, Right [[id number]]  (Normal) Collected: 08/30/22 0915    Lab Status: Preliminary result Specimen: Blood from Hand, Right Updated: 09/01/22 1016     Blood Culture No growth at 2 days    COVID PRE-OP / PRE-PROCEDURE SCREENING ORDER (NO ISOLATION) - Swab, Nasopharynx [[id number]]  (Normal) Collected: 08/30/22 0918    Lab Status: Final result Specimen: Swab from Nasopharynx Updated: 08/30/22 1046    Narrative:      The following  orders were created for panel order COVID PRE-OP / PRE-PROCEDURE SCREENING ORDER (NO ISOLATION) - Swab, Nasopharynx.  Procedure                               Abnormality         Status                     ---------                               -----------         ------                     Respiratory Panel PCR w/...[[id number]]  Normal              Final result                 Please view results for these tests on the individual orders.    Respiratory Panel PCR w/COVID-19(SARS-CoV-2) SIDNEY/GERA/CAMRON/PAD/COR/MAD/CITLALI In-House, NP Swab in UTM/VTM, 3-4 HR TAT - Swab, Nasopharynx [[id number]]  (Normal) Collected: 08/30/22 0918    Lab Status: Final result Specimen: Swab from Nasopharynx Updated: 08/30/22 1046     ADENOVIRUS, PCR Not Detected     Coronavirus 229E Not Detected     Coronavirus HKU1 Not Detected     Coronavirus NL63 Not Detected     Coronavirus OC43 Not Detected     COVID19 Not Detected     Human Metapneumovirus Not Detected     Human Rhinovirus/Enterovirus Not Detected     Influenza A PCR Not Detected     Influenza B PCR Not Detected     Parainfluenza Virus 1 Not Detected     Parainfluenza Virus 2 Not Detected     Parainfluenza Virus 3 Not Detected     Parainfluenza Virus 4 Not Detected     RSV, PCR Not Detected     Bordetella pertussis pcr Not Detected     Bordetella parapertussis PCR Not Detected     Chlamydophila pneumoniae PCR Not Detected     Mycoplasma pneumo by PCR Not Detected    Narrative:      In the setting of a positive respiratory panel with a viral infection PLUS a negative procalcitonin without other underlying concern for bacterial infection, consider observing off antibiotics or discontinuation of antibiotics and continue supportive care. If the respiratory panel is positive for atypical bacterial infection (Bordetella pertussis, Chlamydophila pneumoniae, or Mycoplasma pneumoniae), consider antibiotic de-escalation to target atypical bacterial infection.          Cardiac  Catheterization/Vascular Study    Result Date: 9/1/2022  FINAL     Impression: · Angulated/tortuous proximal circumflex with nonocclusive (up to 50%) plaque with normal IFR of 1.0. · Angiographically near normal left main, the LAD and the dominant RCA. · Dilated, probably nonischemic cardiomyopathy with severe left ventricular systolic dysfunction, estimated ejection fraction 30%. · Left ventricular apical filling defect highly suggestive of apical thrombus. RECOMMENDATIONS: · Optimize medical therapy and risk factor management per guidelines. · Consideration of transesophageal echocardiogram for further assessment of left ventricular apical filling defect and consideration of long-term anticoagulation therapy.  Indications: Non-STEMI/cardiomyopathy.  Access: Right radial. Procedures: · Left heart catheterization. · Left ventriculogram. · Selective coronary angiography. · Arterial site hemostasis with radial band. Procedure narrative: The patient was brought to the catheterization lab in a fasting condition.  Access site was prepped and draped in standard sterile fashion.  Lidocaine was injected and arterial access was obtained by percutaneous anterior wall puncture technique.  A 6 Wallis[place] arterial sheath was placed. Above procedures were performed without complications.  At the conclusion the arterial sheath was removed and hemostasis was achieved.  The patient was transferred to the unit in a stable condition. Hemodynamic Findings: Heart Rate: 77/minute. LV pressure: 90 5/2-2012 mmHg, on pull back no gradient was recorded across the aortic valve. Angiographic Findings: Right coronary dominance. · LM: Angiographically normal. · LAD: Minor irregularities with angiographically near normal vessel. · LCX: Tortuous vessel with significant proximal tortuosity/angulation with evidence of eccentric plaque which appears significant in some views and less than 50% in most views.  This was further assessed with IFR and the IFR  of the circumflex was normal at 1.0. · RCA: Dominant vessel, angiographically normal. · LV: Left ventriculogram performed in 30 REDDY projection revealed dilated left ventricle with severe global hypokinesis and apical dyskinesis with severe left ventricular systolic dysfunction.  Estimated ejection fraction is 30%.  Apical filling defect is noted suggesting presence of a left ventricular apical thrombus.  Mild mitral regurgitation was noted. Complications: No acute procedure related complications.     MRI abdomen wo contrast mrcp    Result Date: 9/1/2022  MRI ABDOMEN WO CONTRAST MRCP-  Date of Exam: 9/1/2022 5:45 AM  Indication: Dilated CBD, elevated LFTs; I50.23-Acute on chronic systolic (congestive) heart failure.  Comparison: Liver ultrasound 8/30/2022. CT abdomen and pelvis 8/30/2022.  Technique:  Multiplanar multisequence MR images of the abdomen was performed without contrast. MRCP was performed.  FINDINGS:  The liver size is within normal limits. The liver does not appear grossly cirrhotic or steatotic. Limited evaluation of solid organ parenchyma due to lack of IV contrast. No focal liver lesion is identified.  There is layering material dependently in the gallbladder favored to represent gallbladder sludge. The gallbladder wall does not appear grossly thickened.  There is no abnormal intrahepatic biliary ductal dilation. The common bile duct demonstrates mild fusiform dilation up to 7 mm. No CBD suspicious stricture or choledocholithiasis is identified. There is focal severe narrowing at the level of the ampulla.  The pancreatic duct caliber is normal. There is no indication of pancreatic divisum morphology.  The spleen, pancreas, adrenals, and kidneys have a normal noncontrast appearance. The bowel does not appear overly thickened, dilated or inflamed.       Impression:  1. CBD demonstrates mild fusiform dilation up to 7 mm, which may support represent a normal variant for this patient. There is no abnormal  intrahepatic biliary dilation. 2. There is focal tapered narrowing at the level of the ampulla. Mild ampullary stenosis is not excluded. No suspicious high-grade CBD stricture is seen. 3. No choledocholithiasis is seen. 4. Small gallbladder sludge.   This report was finalized on 9/1/2022 8:25 AM by Bharati Luong MD.        Results for orders placed during the hospital encounter of 08/30/22    Adult Transthoracic Echo Complete W/ Cont if Necessary Per Protocol    Interpretation Summary  · Estimated left ventricular EF = 25% Left ventricular ejection fraction appears to be 21 - 25%. Left ventricular systolic function is severely decreased.  · There is a small left pleural effusion.  · The following left ventricular wall segments are hypokinetic: mid anterior, apical anterior, basal anterolateral, mid anterolateral, apical lateral, basal inferolateral, mid inferolateral, mid anteroseptal and basal anterior. The following left ventricular wall segments are akinetic: apical inferior, basal inferior, mid inferior, apical septal, basal inferoseptal, mid inferoseptal and apex.  · The left ventricular cavity is mild to moderately dilated.  · The findings are consistent with dilated cardiomyopathy.  · Left ventricular diastolic function is consistent with (grade I) impaired relaxation.  · Left atrial volume is mildly increased.  · Estimated right ventricular systolic pressure from tricuspid regurgitation is normal (<35 mmHg).  · Normal right ventricular cavity size, wall thickness and septal motion noted with moderately reduced right ventricular systolic function.  · No evidence of left ventricular thrombus or mass present.  · There is no evidence of pericardial effusion.  · No evidence of pulmonary hypertension is present.  · Mild to moderate aortic valve regurgitation is present.      I have reviewed the medications:  Scheduled Meds:aspirin, 81 mg, Oral, Daily  carvedilol, 6.25 mg, Oral, BID With Meals  desvenlafaxine, 50  mg, Oral, Daily  enoxaparin, 40 mg, Subcutaneous, Daily  lisdexamfetamine, 60 mg, Oral, Daily  nicotine, 1 patch, Transdermal, Daily  pharmacy consult - MTM, , Does not apply, Daily  sodium chloride, 10 mL, Intravenous, Q12H  torsemide, 10 mg, Oral, Daily  valsartan, 40 mg, Oral, Q12H      Continuous Infusions:   PRN Meds:.•  acetaminophen  •  acetaminophen  •  acetaminophen  •  magnesium sulfate **OR** magnesium sulfate **OR** magnesium sulfate  •  nitroglycerin  •  ondansetron  •  potassium chloride  •  potassium chloride  •  sodium chloride  •  sodium chloride    Assessment & Plan   Assessment & Plan     Active Hospital Problems    Diagnosis  POA   • **Acute combined systolic (congestive) and diastolic (congestive) heart failure (HCC) [I50.41]  Yes   • Leukocytosis [D72.829]  Yes   • Elevated LFTs [R79.89]  Yes   • Renal insufficiency [N28.9]  Yes   • Tobacco use [Z72.0]  Yes   • Unintentional weight loss [R63.4]  Yes   • Elevated troponin [R77.8]  Yes   • Acute on chronic systolic congestive heart failure (HCC) [I50.23]  Unknown      Resolved Hospital Problems   No resolved problems to display.        Brief Hospital Course to date:  Nae Sanchez is a 57 y.o. female with tobacco use who presented due to dyspnea and fatigue and was found to have new congestive heart failure.     Acute combined systolic and diastolic CHF  Elevated troponin  - Echo with LVEF 25%, diastolic dysfunction and multiple areas of wall motion abnormalities  -Underwent left heart cath on 9/1/2022 that showed nonobstructive coronary artery disease, dilated nonischemic cardiomyopathy with severe left ventricular systolic dysfunction, left ventricular apical filling defect suggestive of apical thrombus.    -Cardiology recommends Xarelto 20 mg daily until left ventricular function improves.  Deferring transesophageal echo.  -Continue Coreg, aspirin, Demadex, valsartan per cardiology  -will need close follow-up with cardiology in 4 to 6  weeks  -Creatinine trended up slightly, repeat in a.m.     Leukocytosis, improving   - UA not suggestive of infection  - CT chest/abdomen/pelvis without source of infection  -Leukocytosis trending down     LFT elevation, improving  - Possibly due to CHF  - Mild enlargement of CBD on ultrasound. MRCP showed dilation of 7 mm and no intrahepatic biliary dilation, focal tapered narrowing at the level of the ampulla.  Given the fact that the patient is pain-free and LFTs improving with diuresis will defer additional work-up; however, if she were to develop any symptoms this would need to be worked up further.  -improving w diuresis     Left great toe pain  - Possibly gout  -Continue colchicine     Poor appetite, weight loss  - TSH WNL  - Nutrition consult     Renal insufficiency, acuity unknown  - Monitor with diuresis, improving     Tobacco use  - NRT if desired     Expected Discharge Location and Transportation: Home  Expected Discharge Date: 9/3    DVT prophylaxis:  Medical DVT prophylaxis orders are present.     AM-PAC 6 Clicks Score (PT): 21 (09/01/22 2000)    CODE STATUS:   Code Status and Medical Interventions:   Ordered at: 08/30/22 0942     Level Of Support Discussed With:    Patient     Code Status (Patient has no pulse and is not breathing):    CPR (Attempt to Resuscitate)     Medical Interventions (Patient has pulse or is breathing):    Full Support     I have prepared this progress note with copied portions of the prior day's progress note of my own authorship to preserve accuracy and maintain consistency of documentation. I have reviewed these portions and edited them for correctness. I verify that the above documentation accurately and truly represents the evaluation and management performed on today's date.       Joy Su MD  09/02/22

## 2022-09-03 VITALS
BODY MASS INDEX: 24.84 KG/M2 | WEIGHT: 149.1 LBS | HEIGHT: 65 IN | SYSTOLIC BLOOD PRESSURE: 123 MMHG | DIASTOLIC BLOOD PRESSURE: 85 MMHG | RESPIRATION RATE: 19 BRPM | TEMPERATURE: 97.6 F | HEART RATE: 78 BPM | OXYGEN SATURATION: 93 %

## 2022-09-03 PROBLEM — I42.8 NICM (NONISCHEMIC CARDIOMYOPATHY): Status: ACTIVE | Noted: 2022-09-03

## 2022-09-03 PROBLEM — I50.23 ACUTE ON CHRONIC SYSTOLIC CONGESTIVE HEART FAILURE (HCC): Status: RESOLVED | Noted: 2022-08-30 | Resolved: 2022-09-03

## 2022-09-03 PROBLEM — I50.22 CHRONIC SYSTOLIC HEART FAILURE: Status: ACTIVE | Noted: 2022-09-03

## 2022-09-03 LAB
ALBUMIN SERPL-MCNC: 3.5 G/DL (ref 3.5–5.2)
ANION GAP SERPL CALCULATED.3IONS-SCNC: 8 MMOL/L (ref 5–15)
BUN SERPL-MCNC: 25 MG/DL (ref 6–20)
BUN/CREAT SERPL: 17.6 (ref 7–25)
CALCIUM SPEC-SCNC: 8.6 MG/DL (ref 8.6–10.5)
CHLORIDE SERPL-SCNC: 96 MMOL/L (ref 98–107)
CO2 SERPL-SCNC: 31 MMOL/L (ref 22–29)
CREAT SERPL-MCNC: 1.42 MG/DL (ref 0.57–1)
EGFRCR SERPLBLD CKD-EPI 2021: 43.2 ML/MIN/1.73
GLUCOSE SERPL-MCNC: 134 MG/DL (ref 65–99)
MAGNESIUM SERPL-MCNC: 2.5 MG/DL (ref 1.6–2.6)
PHOSPHATE SERPL-MCNC: 2.7 MG/DL (ref 2.5–4.5)
POTASSIUM SERPL-SCNC: 4.4 MMOL/L (ref 3.5–5.2)
SODIUM SERPL-SCNC: 135 MMOL/L (ref 136–145)

## 2022-09-03 PROCEDURE — 25010000002 ENOXAPARIN PER 10 MG: Performed by: INTERNAL MEDICINE

## 2022-09-03 PROCEDURE — 97162 PT EVAL MOD COMPLEX 30 MIN: CPT

## 2022-09-03 PROCEDURE — 99239 HOSP IP/OBS DSCHRG MGMT >30: CPT | Performed by: INTERNAL MEDICINE

## 2022-09-03 PROCEDURE — 80069 RENAL FUNCTION PANEL: CPT | Performed by: INTERNAL MEDICINE

## 2022-09-03 PROCEDURE — 83735 ASSAY OF MAGNESIUM: CPT | Performed by: INTERNAL MEDICINE

## 2022-09-03 PROCEDURE — 99232 SBSQ HOSP IP/OBS MODERATE 35: CPT | Performed by: INTERNAL MEDICINE

## 2022-09-03 RX ORDER — VALSARTAN 40 MG/1
40 TABLET ORAL DAILY
Qty: 30 TABLET | Refills: 6 | Status: SHIPPED | OUTPATIENT
Start: 2022-09-03 | End: 2023-02-28

## 2022-09-03 RX ORDER — TORSEMIDE 10 MG/1
10 TABLET ORAL DAILY
Qty: 30 TABLET | Refills: 6 | Status: SHIPPED | OUTPATIENT
Start: 2022-09-04 | End: 2022-10-26

## 2022-09-03 RX ORDER — CARVEDILOL 6.25 MG/1
6.25 TABLET ORAL 2 TIMES DAILY WITH MEALS
Qty: 60 TABLET | Refills: 6 | Status: SHIPPED | OUTPATIENT
Start: 2022-09-03 | End: 2022-11-22 | Stop reason: SDUPTHER

## 2022-09-03 RX ORDER — ASPIRIN 81 MG/1
81 TABLET ORAL DAILY
Qty: 30 TABLET | Refills: 6 | Status: SHIPPED | OUTPATIENT
Start: 2022-09-04

## 2022-09-03 RX ADMIN — Medication 10 ML: at 08:25

## 2022-09-03 RX ADMIN — ENOXAPARIN SODIUM 40 MG: 40 INJECTION SUBCUTANEOUS at 08:25

## 2022-09-03 RX ADMIN — Medication 1 PATCH: at 08:28

## 2022-09-03 RX ADMIN — VALSARTAN 40 MG: 80 TABLET, FILM COATED ORAL at 08:26

## 2022-09-03 RX ADMIN — TORSEMIDE 10 MG: 10 TABLET ORAL at 08:26

## 2022-09-03 RX ADMIN — DESVENLAFAXINE 50 MG: 50 TABLET, EXTENDED RELEASE ORAL at 08:27

## 2022-09-03 RX ADMIN — LISDEXAMFETAMINE DIMESYLATE 60 MG: 60 CAPSULE ORAL at 08:31

## 2022-09-03 RX ADMIN — ASPIRIN 81 MG: 81 TABLET, COATED ORAL at 08:26

## 2022-09-03 RX ADMIN — CARVEDILOL 6.25 MG: 6.25 TABLET, FILM COATED ORAL at 08:26

## 2022-09-03 NOTE — THERAPY EVALUATION
Acute Care - Physical Therapy Initial Evaluation  [facility]     Patient Name: Nae Sanchez  : 1965  MRN: [id number]  Today's Date: 9/3/2022      Visit Dx:     ICD-10-CM ICD-9-CM   1. Acute on chronic systolic congestive heart failure (HCC)  I50.23 428.23     428.0     Patient Active Problem List   Diagnosis   • Leukocytosis   • Elevated LFTs   • Renal insufficiency   • Tobacco use   • Unintentional weight loss   • Elevated troponin   • NICM (nonischemic cardiomyopathy) (HCC)   • Chronic systolic heart failure (CMS/HCC)     Past Medical History:   Diagnosis Date   • Anxiety    • Depression      Past Surgical History:   Procedure Laterality Date   • CARDIAC CATHETERIZATION N/A 2022    Procedure: Left Heart Cath;  Surgeon: Greta Sanchez MD;  Location: [facility] INVASIVE LOCATION;  Service: Cardiology;  Laterality: N/A;   • FOOT SURGERY Left      PT Assessment (last 12 hours)     PT Evaluation and Treatment    No documentation.                 Physical Therapy Education                 Title: PT OT SLP Therapies (Done)     Topic: Physical Therapy (Done)     Point: Mobility training (Done)     Learning Progress Summary           Patient Acceptance, E,TB, VU by KW at 9/3/2022 1100    Comment: Patient edcuated to continue ambulating several times a day to maintain mobility and activity tolerance                   Point: Home exercise program (Done)     Learning Progress Summary           Patient Acceptance, E,TB, VU by KW at 9/3/2022 1100    Comment: Patient edcuated to continue ambulating several times a day to maintain mobility and activity tolerance                   Point: Body mechanics (Done)     Learning Progress Summary           Patient Acceptance, E,TB, VU by KW at 9/3/2022 1100    Comment: Patient edcuated to continue ambulating several times a day to maintain mobility and activity tolerance                   Point: Precautions (Done)     Learning Progress Summary           Patient  Acceptance, E,TB, VU by  at 9/3/2022 1100    Comment: Patient edcuated to continue ambulating several times a day to maintain mobility and activity tolerance                               User Key     Initials Effective Dates Name Provider Type Discipline     01/27/22 -  Ibis Salcedo PT Physical Therapist PT              PT Recommendation and Plan  Therapy Frequency (PT): evaluation only  Plan of Care Reviewed With: patient  Progress: no change  Outcome Evaluation: PT eval completed. Patient demonstrates safety with mobility and gait. She demonstrates safety awareness and adequate dynamic balance and activity tolerance. Patient does not need skilled PT services at this time.       Time Calculation:    PT Charges     Row Name 09/03/22 1100             Time Calculation    Start Time 1100  -KW      PT Received On 09/03/22  -KW              Untimed Charges    PT Eval/Re-eval Minutes 30  -KW              Total Minutes    Untimed Charges Total Minutes 30  -KW       Total Minutes 30  -KW            User Key  (r) = Recorded By, (t) = Taken By, (c) = Cosigned By    Initials Name Provider Type     Ibis Salcedo PT Physical Therapist              Therapy Charges for Today     Code Description Service Date Service Provider Modifiers Qty    58956011160 HC PT EVAL MOD COMPLEXITY 2 9/3/2022 Ibis Salcedo PT GP 1          PT G-Codes  Outcome Measure Options: AM-PAC 6 Clicks Basic Mobility (PT)  AM-PAC 6 Clicks Score (PT): 24  [place] Total Score: 53    Ibis Salcedo PT  9/3/2022

## 2022-09-03 NOTE — NURSING NOTE
Went over discharge instructions with patient. Had no additional questions and voiced understanding. IVs removed and catheter tips intact. Tele removed. Lifevest sent with patient. Patient left my care in stable condition.

## 2022-09-03 NOTE — PROGRESS NOTES
"[facility] - Progress Note    Nae Sanchez  1965  S520/1     LOS: 4 days     Patient Care Team:  Mounika Chatman APRN as PCP - General (Family Medicine)    09/03/22    Chief Complaint: Follow-up NICM, Systolic heart failure     Subjective: Patient feels well.  Eating lunch.  Not swelling much improved from admission.  No dyspnea.      aspirin, 81 mg, Oral, Daily  carvedilol, 6.25 mg, Oral, BID With Meals  desvenlafaxine, 50 mg, Oral, Daily  enoxaparin, 40 mg, Subcutaneous, Daily  lisdexamfetamine, 60 mg, Oral, Daily  nicotine, 1 patch, Transdermal, Daily  pharmacy consult - MTM, , Does not apply, Daily  sodium chloride, 10 mL, Intravenous, Q12H  torsemide, 10 mg, Oral, Daily  valsartan, 40 mg, Oral, Q12H        Objective:  Vitals:   height is 165.1 cm (65\") and weight is 67.6 kg (149 lb 1.6 oz). Her oral temperature is 97.4 °F (36.3 °C). Her blood pressure is 111/72 and her pulse is 78. Her respiration is 18 and oxygen saturation is 96%.       Intake/Output Summary (Last 24 hours) at 9/3/2022 0756  Last data filed at 9/2/2022 1031  Gross per 24 hour   Intake --   Output 600 ml   Net -600 ml       Physical Exam:  General: NAD  Cardiac: LifeVest in place, regular rate and rhythm n 2+ pulse at right radial cath site  Lungs: Clear to auscultation bilaterally  Extremities: Minimal pedal edema             Results from last 7 days   Lab Units 09/02/22  0347   WBC 10*3/mm3 12.36*   HEMOGLOBIN g/dL 11.7*   HEMATOCRIT % 36.4   PLATELETS 10*3/mm3 347     Results from last 7 days   Lab Units 09/03/22  0417 09/02/22  0347   SODIUM mmol/L 135* 137   POTASSIUM mmol/L 4.4 3.5   CHLORIDE mmol/L 96* 94*   CO2 mmol/L 31.0* 32.0*   BUN mg/dL 25* 23*   CREATININE mg/dL 1.42* 1.41*   CALCIUM mg/dL 8.6 8.1*   BILIRUBIN mg/dL  --  0.7   ALK PHOS U/L  --  134*   ALT (SGPT) U/L  --  26   AST (SGOT) U/L  --  37*   GLUCOSE mg/dL 134* 123*         Results from last 7 days   Lab Units 08/31/22  0518   TSH " uIU/mL 1.380     Results from last 7 days   Lab Units 08/31/22  0518   CHOLESTEROL mg/dL 112   TRIGLYCERIDES mg/dL 82   HDL CHOL mg/dL 30*   LDL CHOL mg/dL 66     Results from last 7 days   Lab Units 08/30/22  0737   PROBNP pg/mL 49,288.0*     Cardiac Cath 09/01/22    FINAL IMPRESSION:  · Angulated/tortuous proximal circumflex with nonocclusive (up to 50%) plaque with normal IFR of 1.0.  · Angiographically near normal left main, the LAD and the dominant RCA.  · Dilated, probably nonischemic cardiomyopathy with severe left ventricular systolic dysfunction, estimated ejection fraction 30%.  · Left ventricular apical filling defect highly suggestive of apical thrombus.     Tele:  NSR @ 80 with Frequent PVC's      Problem List:  1. NICM  · Non-obstructive disease per cath 09/01/22  · LVEF 25 % per echo 8/30/22- Xarelto 20 mg po daily- for possible apical thrombus.  · Life vest order placed- spoke with Lizett    2. Systolic Heart Failure  · Coreg 6.25 po bid and torsemide 10 mg daily  · Follow up with Heart and Valve in 1 week    3. Hypotension on Valsartan 40 bid- B/P 90's @ times.  Will discharge on valsartan 40 mg daily    I sent a secure chat to office scheduling to call pt with HFU with  for 4 weeks.    Heart Failure teaching completed.     Plan:  -Okay for discharge home on current cardiac medication  -Discussed with patient recommendation for Xarelto 20 mg for prevention of LV thrombus until follow-up echocardiogram, she is agreeable  -LifeVest in place       Summer Degroot RN. 9/3/2022  07:56 EDT

## 2022-09-03 NOTE — PROGRESS NOTES
Nae Sanchez has been started on Xarelto.  Education was provided both verbally and in writing on 09/03/22. Discussed effects of this new medication, drug-drug interactions, and signs/symptoms of bleeding and clotting. Instructed her to go to the ED if she ever falls and hits her head, and reminded her of the importance of notifying all providers of this change, especially in the setting of a planned medical or surgical procedure. Ms. Sanchez verbalized understanding through teach back, and all pertinent questions were answered.     Elizabeth Marcos, PharmD, BCPS  09/03/22  14:18 EDT

## 2022-09-03 NOTE — DISCHARGE SUMMARY
[facility] Medicine Services  DISCHARGE SUMMARY    Patient Name: Nae Sanchez  : 1965  MRN: [id number]    Date of Admission: 2022  6:49 AM  Date of Discharge:  9/3/22  Primary Care Physician: Mounika Chatman APRN    Consults     Date and Time Order Name Status Description    2022 10:56 AM Inpatient Cardiology Consult Completed           Hospital Course     Presenting Problem:   CHF (congestive heart failure) (HCC) [I50.9]    Active Hospital Problems    Diagnosis  POA   • NICM (nonischemic cardiomyopathy) (HCC) [I42.8]  Yes   • Chronic systolic heart failure (CMS/HCC) [I50.22]  Unknown   • Leukocytosis [D72.829]  Yes   • Elevated LFTs [R79.89]  Yes   • Renal insufficiency [N28.9]  Yes   • Tobacco use [Z72.0]  Yes   • Unintentional weight loss [R63.4]  Yes   • Elevated troponin [R77.8]  Yes      Resolved Hospital Problems    Diagnosis Date Resolved POA   • Acute on chronic systolic congestive heart failure (HCC) [I50.23] 2022 Yes          Nae Sanchez is a 57 y.o. female with tobacco use who presented due to dyspnea and fatigue and was found to have new congestive heart failure.  Troponin was elevated at 0.551.  proBNP was elevated at 49,000.  CMP showed mildly elevated LFTs and bilirubin.  She also had a leukocytosis of 18,000.  Procalcitonin was normal. Chest x-ray on presentation showed left greater than right basilar airspace disease and small to moderate layering bilateral pleural effusions.  CTA of the chest was negative for PE but did show small to moderate bilateral pleural effusions with scattered bilateral atelectasis and mediastinal lymphadenopathy, likely reactive.  CT abdomen pelvis with contrast showed mild pelvic ascites and diverticulosis but was otherwise unremarkable.  Echo was obtained and showed an ejection fraction of 25%, diastolic dysfunction and multiple areas of wall motion abnormalities.  Cardiology was consulted and she underwent  a left heart cath on 9/1/2022 that showed nonobstructive coronary artery disease, dilated nonischemic cardiomyopathy with severe left ventricular systolic dysfunction, and possible left apical ventricular filling defect suggestive of apical thrombus.  She was started on Xarelto empirically and will continue this on discharge.  She will be discharged on Coreg 6.25 mg twice daily as well as torsemide 10 mg daily.  Had some borderline hypotension on valsartan twice daily, will discharge on once daily valsartan.  She will be scheduled for a follow-up with the heart valve clinic in 1 week and follow-up with  in 4 weeks.  She was also provided a LifeVest prior to discharge.    Of note, the patient was taking ibuprofen per her medication reconciliation.  Given her CKD this is not advisable.  I counseled the patient to avoid NSAIDs due to her kidney disease.  Recommended only Tylenol as an over-the-counter pain medication.  She voiced understanding.         Discharge Follow Up Recommendations for outpatient labs/diagnostics:  Follow-up in the heart valve clinic in 1 week, recommend repeat BMP to check creatinine  Follow-up with  in 4 weeks    Day of Discharge     See my progress note from today's date for HPI and physical exam.    Pertinent  and/or Most Recent Results     LAB RESULTS:      Lab 09/02/22  0347 09/01/22  1008 08/31/22  0518 08/30/22  0737   WBC 12.36* 15.82* 17.27* 18.37*   HEMOGLOBIN 11.7* 12.3 12.4 13.2   HEMATOCRIT 36.4 36.8 38.6 40.0   PLATELETS 347 333 403 504*   NEUTROS ABS 8.71* 11.84*  --  14.70*   IMMATURE GRANS (ABS) 0.12* 0.15*  --  0.20*   LYMPHS ABS 2.31 2.40  --  2.01   MONOS ABS 0.90 1.03*  --  1.11*   EOS ABS 0.21 0.27  --  0.19   MCV 84.3 81.4 83.5 83.5   PROCALCITONIN  --   --   --  0.05   LACTATE  --   --   --  1.8         Lab 09/03/22  0417 09/02/22  0347 09/01/22  1008 08/31/22  1639 08/31/22  0518 08/30/22  0748 08/30/22  0746 08/30/22  0737   SODIUM 135* 137 142  --   143  --   --  140   POTASSIUM 4.4 3.5 3.6  --  3.4*  --   --  4.1   CHLORIDE 96* 94* 97*  --  101  --   --  102   CO2 31.0* 32.0* 33.0*  --  33.0*  --   --  23.0   ANION GAP 8.0 11.0 12.0  --  9.0  --   --  15.0   BUN 25* 23* 23*  --  18  --   --  13   CREATININE 1.42* 1.41* 1.23*  --  1.32* 1.20   < > 1.15*   EGFR 43.2* 43.6* 51.4*  --  47.2*  --   --  55.7*   GLUCOSE 134* 123* 99  --  107*  --   --  127*   CALCIUM 8.6 8.1* 8.6  --  8.4*  --   --  8.9   MAGNESIUM 2.5 1.6 1.6 1.7 1.8  --   --   --    PHOSPHORUS 2.7  --  3.5  --   --   --   --   --    HEMOGLOBIN A1C  --   --   --   --  5.70*  --   --   --    TSH  --   --   --   --  1.380  --   --  3.530    < > = values in this interval not displayed.         Lab 09/03/22  0417 09/02/22  0347 09/01/22  1008 08/31/22  0518 08/30/22  0737   TOTAL PROTEIN  --  5.8* 6.2 5.8* 6.6   ALBUMIN 3.50 3.10* 3.70 3.40* 3.90   GLOBULIN  --  2.7 2.5 2.4 2.7   ALT (SGPT)  --  26 33 37* 42*   AST (SGOT)  --  37* 54* 90* 75*   BILIRUBIN  --  0.7 1.0 0.9 1.5*   ALK PHOS  --  134* 144* 147* 170*         Lab 08/31/22  0518 08/30/22  0737   PROBNP  --  49,288.0*   TROPONIN T 0.983* 0.551*         Lab 08/31/22  0518   CHOLESTEROL 112   LDL CHOL 66   HDL CHOL 30*   TRIGLYCERIDES 82         Lab 08/30/22  0737   VITAMIN B 12 521         Brief Urine Lab Results  (Last result in the past 365 days)      Color   Clarity   Blood   Leuk Est   Nitrite   Protein   CREAT   Urine HCG        08/30/22 1037 Yellow   Clear   Negative   Negative   Negative   Trace               Microbiology Results (last 10 days)     Procedure Component Value - Date/Time    COVID PRE-OP / PRE-PROCEDURE SCREENING ORDER (NO ISOLATION) - Swab, Nasopharynx [[id number]]  (Normal) Collected: 08/30/22 0918    Lab Status: Final result Specimen: Swab from Nasopharynx Updated: 08/30/22 1046    Narrative:      The following orders were created for panel order COVID PRE-OP / PRE-PROCEDURE SCREENING ORDER (NO ISOLATION) - Swab,  Nasopharynx.  Procedure                               Abnormality         Status                     ---------                               -----------         ------                     Respiratory Panel PCR w/...[[id number]]  Normal              Final result                 Please view results for these tests on the individual orders.    Respiratory Panel PCR w/COVID-19(SARS-CoV-2) SIDNEY/GERA/CAMRON/PAD/COR/MAD/[facility] In-House, NP Swab in UTM/VTM, 3-4 HR TAT - Swab, Nasopharynx [[id number]]  (Normal) Collected: 08/30/22 0918    Lab Status: Final result Specimen: Swab from Nasopharynx Updated: 08/30/22 1046     ADENOVIRUS, PCR Not Detected     Coronavirus 229E Not Detected     Coronavirus HKU1 Not Detected     Coronavirus NL63 Not Detected     Coronavirus OC43 Not Detected     COVID19 Not Detected     Human Metapneumovirus Not Detected     Human Rhinovirus/Enterovirus Not Detected     Influenza A PCR Not Detected     Influenza B PCR Not Detected     Parainfluenza Virus 1 Not Detected     Parainfluenza Virus 2 Not Detected     Parainfluenza Virus 3 Not Detected     Parainfluenza Virus 4 Not Detected     RSV, PCR Not Detected     Bordetella pertussis pcr Not Detected     Bordetella parapertussis PCR Not Detected     Chlamydophila pneumoniae PCR Not Detected     Mycoplasma pneumo by PCR Not Detected    Narrative:      In the setting of a positive respiratory panel with a viral infection PLUS a negative procalcitonin without other underlying concern for bacterial infection, consider observing off antibiotics or discontinuation of antibiotics and continue supportive care. If the respiratory panel is positive for atypical bacterial infection (Bordetella pertussis, Chlamydophila pneumoniae, or Mycoplasma pneumoniae), consider antibiotic de-escalation to target atypical bacterial infection.    Blood Culture - Blood, Hand, Right [[id number]]  (Normal) Collected: 08/30/22 0915    Lab Status: Preliminary result Specimen: Blood from  Hand, Right Updated: 09/03/22 1017     Blood Culture No growth at 4 days    Blood Culture - Blood, Arm, Right [[id number]]  (Normal) Collected: 08/30/22 0915    Lab Status: Preliminary result Specimen: Blood from Arm, Right Updated: 09/03/22 1017     Blood Culture No growth at 4 days          Adult Transthoracic Echo Complete W/ Cont if Necessary Per Protocol    Result Date: 8/30/2022  · Estimated left ventricular EF = 25% Left ventricular ejection fraction appears to be 21 - 25%. Left ventricular systolic function is severely decreased. · There is a small left pleural effusion. · The following left ventricular wall segments are hypokinetic: mid anterior, apical anterior, basal anterolateral, mid anterolateral, apical lateral, basal inferolateral, mid inferolateral, mid anteroseptal and basal anterior. The following left ventricular wall segments are akinetic: apical inferior, basal inferior, mid inferior, apical septal, basal inferoseptal, mid inferoseptal and apex. · The left ventricular cavity is mild to moderately dilated. · The findings are consistent with dilated cardiomyopathy. · Left ventricular diastolic function is consistent with (grade I) impaired relaxation. · Left atrial volume is mildly increased. · Estimated right ventricular systolic pressure from tricuspid regurgitation is normal (<35 mmHg). · Normal right ventricular cavity size, wall thickness and septal motion noted with moderately reduced right ventricular systolic function. · No evidence of left ventricular thrombus or mass present. · There is no evidence of pericardial effusion. · No evidence of pulmonary hypertension is present. · Mild to moderate aortic valve regurgitation is present.      CT Abdomen Pelvis With Contrast    Result Date: 8/30/2022  DATE OF EXAM: 8/30/2022 8:32 AM  PROCEDURE: CT ANGIOGRAM CHEST-, CT ABDOMEN PELVIS W CONTRAST-  INDICATIONS: abnormal cxr, soa, weight loss  COMPARISON: Chest radiograph from earlier today   TECHNIQUE: Contiguous axial imaging was obtained of the chest abdomen and pelvis following the intravenous administration of 85 mL of Isovue 370. Reconstructed coronal and sagittal images were also obtained. Automated exposure control and iterative reconstruction methods were used.  The radiation dose reduction device was turned on for each scan per the ALARA (As Low as Reasonably Achievable) protocol.  FINDINGS:  Chest:  The central tracheobronchial tree is clear. There is some scarring at the lung apices. There are small-to-moderate bilateral pleural effusions with scattered bilateral atelectasis.  The heart is enlarged. The great vessels are normal in caliber. There is no evidence of pulmonary embolus. There is mediastinal lymphadenopathy, likely reactive.  No aggressive osseous lesions are identified.  Abdomen/pelvis:  The liver, gallbladder, adrenal glands, right kidney, spleen, and pancreas are unremarkable. There is mild left renal atrophy.  The stomach appears normal. The small bowel appears normal in caliber and configuration. There is sigmoid diverticulosis. The appendix is not well-visualized. There is mild ascites in the pelvis. No abnormally enlarged lymph nodes are identified.  The rectum, uterus, and urinary bladder are unremarkable.  No aggressive osseous lesions are identified.      1.  Negative for pulmonary embolus. 2.  Small-to-moderate bilateral pleural effusions with scattered bilateral atelectasis. 3.  Cardiomegaly. 4.  Mediastinal lymphadenopathy, likely reactive. 5.  Sigmoid diverticulosis. 6.  Mild pelvic ascites.  This report was finalized on 8/30/2022 8:53 AM by Silas Villegas MD.      Cardiac Catheterization/Vascular Study    Result Date: 9/1/2022  FINAL     · Angulated/tortuous proximal circumflex with nonocclusive (up to 50%) plaque with normal IFR of 1.0. · Angiographically near normal left main, the LAD and the dominant RCA. · Dilated, probably nonischemic cardiomyopathy with  severe left ventricular systolic dysfunction, estimated ejection fraction 30%. · Left ventricular apical filling defect highly suggestive of apical thrombus. RECOMMENDATIONS: · Optimize medical therapy and risk factor management per guidelines. · Consideration of transesophageal echocardiogram for further assessment of left ventricular apical filling defect and consideration of long-term anticoagulation therapy.  Indications: Non-STEMI/cardiomyopathy.  Access: Right radial. Procedures: · Left heart catheterization. · Left ventriculogram. · Selective coronary angiography. · Arterial site hemostasis with radial band. Procedure narrative: The patient was brought to the catheterization lab in a fasting condition.  Access site was prepped and draped in standard sterile fashion.  Lidocaine was injected and arterial access was obtained by percutaneous anterior wall puncture technique.  A 6 Urdu arterial sheath was placed. Above procedures were performed without complications.  At the conclusion the arterial sheath was removed and hemostasis was achieved.  The patient was transferred to the unit in a stable condition. Hemodynamic Findings: Heart Rate: 77/minute. LV pressure: 90 5/2-2012 mmHg, on pull back no gradient was recorded across the aortic valve. Angiographic Findings: Right coronary dominance. · LM: Angiographically normal. · LAD: Minor irregularities with angiographically near normal vessel. · LCX: Tortuous vessel with significant proximal tortuosity/angulation with evidence of eccentric plaque which appears significant in some views and less than 50% in most views.  This was further assessed with IFR and the IFR of the circumflex was normal at 1.0. · RCA: Dominant vessel, angiographically normal. · LV: Left ventriculogram performed in 30 REDDY projection revealed dilated left ventricle with severe global hypokinesis and apical dyskinesis with severe left ventricular systolic dysfunction.  Estimated ejection  fraction is 30%.  Apical filling defect is noted suggesting presence of a left ventricular apical thrombus.  Mild mitral regurgitation was noted. Complications: No acute procedure related complications.     US Liver    Result Date: 8/30/2022  US LIVER-  Date of Exam: 8/30/2022 2:30 PM  Indication: ascites, elevated LFTS, weight loss.  Comparison Exams: None available.  Technique: Multiple sonographic images of the right upper quadrant of the abdomen were obtained in the transverse and longitudinal planes.  FINDINGS: Limited images of the pancreas are unremarkable.   The visualized portions of the liver parenchyma are homogenous. No intrahepatic mass lesions identified. Hepatic vasculature appears normal.  Gallbladder is within normal limits in size. No gallstones are seen. There are low level internal echoes within the dependent portion of the gallbladder compatible sludge.  There is no gallbladder wall thickening or pericholecystic fluid.  The common hepatic duct is enlarged measuring 7 mm.  No intrahepatic biliary ductal dilatation identified.  The right kidney measures 10.2 cm in [id number] length. There is normal thickness and normal echogenicity of the renal parenchyma. There is no hydronephrosis  Point shear wave elastography (pSWE) of the liver was performed using Siemens software/hardware.  Reference Guidelines from: Juan C RG, Demarco SR, Nader D, et al. Update to the Society of Radiologists in Ultrasound Liver Elastography Consensus Statement. Radiology. Volume 296:Number2-August 2020; p.263-274   US ELASTOGRAPHY: 10 shear wave speed measurements were obtained using a right intercostal approach. The IQR/Median was 0.25 which is less than 0.3  The median liver shear wave speed is 1.43 m/s. This is considered within normal limits..  NOTE: In some patients with NAFLD, the cutoff values for compensated advanced chronic liver disease may be lower and follow up or additional testing would be recommended with  those between 1.5 and 1.7 m/s.  Several conditions can increase liver stiffness unrelated to liver fibrosis including acute hepatitis, liver inflammation, ALT levels >5x normal, obstructive cholestasis, hepatic congestion, and infiltrative liver diseases such as amyloidosis, lymphoma, or extramedullary hematopoiesis. In all these conditions, however, stiffness values within the normal range exclude significant fibrosis.      1. Median liver shear wave speed is 1.43 m/s. This is considered within normal limits. 2. Mild enlargement of common bile duct measuring up 7 mm.  No intrahepatic biliary ductal dilatation identified.  Consider MRCP or ERCP to evaluate for distal common bile duct obstruction       IMPRESSION:   This report was finalized on 8/30/2022 6:28 PM by Scott Rogel MD.      XR Chest 1 View    Result Date: 8/31/2022  Examination: XR CHEST 1 VW-  Date of Exam: 8/31/2022 12:56 AM  Indication: CHF.  Comparison: Radiograph 08/30/2022  Technique: 1 view of the chest  Findings: Mild patchy airspace changes present within the lung bases bilaterally. Small right-sided pleural effusion present. Small left-sided pleural effusion present.. No pneumothorax. The heart is enlarged, stable as compared to the previous study. The pulmonary vasculature appears mildly indistinct. No acute osseous abnormality identified.      Interval improvement in bibasilar airspace disease likely related to atelectasis or pneumonia. Probable mild pulmonary edema pattern with small bilateral pleural effusions, also likely mildly improved.  This report was finalized on 8/31/2022 7:18 AM by Nicole Miguel MD.      XR Chest 1 View    Result Date: 8/30/2022  DATE OF EXAM: 8/30/2022 7:00 AM  PROCEDURE: XR CHEST 1 VW-  INDICATIONS: SOA triage protocol   COMPARISON: None  TECHNIQUE: Single radiographic view of the chest was obtained.  FINDINGS: Small to moderate layering bilateral pleural effusions are present. Bibasilar airspace disease, left  greater the right, may represent pneumonia or atelectasis. Peripheral left mid lung airspace disease favored to represent subsegmental atelectasis. There is moderate cardiomegaly. No pneumothorax is identified. No acute osseous abnormalities are seen.       1. Left greater than right basilar airspace disease may represent pneumonia or atelectasis. 2. Small to moderate layering bilateral pleural effusions. 3. Moderate cardiac enlargement.  This report was finalized on 8/30/2022 7:22 AM by Bharati Luogn MD.      MRI abdomen wo contrast mrcp    Result Date: 9/1/2022  MRI ABDOMEN WO CONTRAST MRCP-  Date of Exam: 9/1/2022 5:45 AM  Indication: Dilated CBD, elevated LFTs; I50.23-Acute on chronic systolic (congestive) heart failure.  Comparison: Liver ultrasound 8/30/2022. CT abdomen and pelvis 8/30/2022.  Technique:  Multiplanar multisequence MR images of the abdomen was performed without contrast. MRCP was performed.  FINDINGS:  The liver size is within normal limits. The liver does not appear grossly cirrhotic or steatotic. Limited evaluation of solid organ parenchyma due to lack of IV contrast. No focal liver lesion is identified.  There is layering material dependently in the gallbladder favored to represent gallbladder sludge. The gallbladder wall does not appear grossly thickened.  There is no abnormal intrahepatic biliary ductal dilation. The common bile duct demonstrates mild fusiform dilation up to 7 mm. No CBD suspicious stricture or choledocholithiasis is identified. There is focal severe narrowing at the level of the ampulla.  The pancreatic duct caliber is normal. There is no indication of pancreatic divisum morphology.  The spleen, pancreas, adrenals, and kidneys have a normal noncontrast appearance. The bowel does not appear overly thickened, dilated or inflamed.        1. CBD demonstrates mild fusiform dilation up to 7 mm, which may support represent a normal variant for this patient. There is no abnormal  intrahepatic biliary dilation. 2. There is focal tapered narrowing at the level of the ampulla. Mild ampullary stenosis is not excluded. No suspicious high-grade CBD stricture is seen. 3. No choledocholithiasis is seen. 4. Small gallbladder sludge.   This report was finalized on 9/1/2022 8:25 AM by Bharati Luong MD.      CT Angiogram Chest    Result Date: 8/30/2022  DATE OF EXAM: 8/30/2022 8:32 AM  PROCEDURE: CT ANGIOGRAM CHEST-, CT ABDOMEN PELVIS W CONTRAST-  INDICATIONS: abnormal cxr, soa, weight loss  COMPARISON: Chest radiograph from earlier today  TECHNIQUE: Contiguous axial imaging was obtained of the chest abdomen and pelvis following the intravenous administration of 85 mL of Isovue 370. Reconstructed coronal and sagittal images were also obtained. Automated exposure control and iterative reconstruction methods were used.  The radiation dose reduction device was turned on for each scan per the ALARA (As Low as Reasonably Achievable) protocol.  FINDINGS:  Chest:  The central tracheobronchial tree is clear. There is some scarring at the lung apices. There are small-to-moderate bilateral pleural effusions with scattered bilateral atelectasis.  The heart is enlarged. The great vessels are normal in caliber. There is no evidence of pulmonary embolus. There is mediastinal lymphadenopathy, likely reactive.  No aggressive osseous lesions are identified.  Abdomen/pelvis:  The liver, gallbladder, adrenal glands, right kidney, spleen, and pancreas are unremarkable. There is mild left renal atrophy.  The stomach appears normal. The small bowel appears normal in caliber and configuration. There is sigmoid diverticulosis. The appendix is not well-visualized. There is mild ascites in the pelvis. No abnormally enlarged lymph nodes are identified.  The rectum, uterus, and urinary bladder are unremarkable.  No aggressive osseous lesions are identified.      1.  Negative for pulmonary embolus. 2.  Small-to-moderate bilateral  pleural effusions with scattered bilateral atelectasis. 3.  Cardiomegaly. 4.  Mediastinal lymphadenopathy, likely reactive. 5.  Sigmoid diverticulosis. 6.  Mild pelvic ascites.  This report was finalized on 8/30/2022 8:53 AM by Silas Villegas MD.                Results for orders placed during the hospital encounter of 08/30/22    Adult Transthoracic Echo Complete W/ Cont if Necessary Per Protocol    Interpretation Summary  · Estimated left ventricular EF = 25% Left ventricular ejection fraction appears to be 21 - 25%. Left ventricular systolic function is severely decreased.  · There is a small left pleural effusion.  · The following left ventricular wall segments are hypokinetic: mid anterior, apical anterior, basal anterolateral, mid anterolateral, apical lateral, basal inferolateral, mid inferolateral, mid anteroseptal and basal anterior. The following left ventricular wall segments are akinetic: apical inferior, basal inferior, mid inferior, apical septal, basal inferoseptal, mid inferoseptal and apex.  · The left ventricular cavity is mild to moderately dilated.  · The findings are consistent with dilated cardiomyopathy.  · Left ventricular diastolic function is consistent with (grade I) impaired relaxation.  · Left atrial volume is mildly increased.  · Estimated right ventricular systolic pressure from tricuspid regurgitation is normal (<35 mmHg).  · Normal right ventricular cavity size, wall thickness and septal motion noted with moderately reduced right ventricular systolic function.  · No evidence of left ventricular thrombus or mass present.  · There is no evidence of pericardial effusion.  · No evidence of pulmonary hypertension is present.  · Mild to moderate aortic valve regurgitation is present.      Plan for Follow-up of Pending Labs/Results:   Pending Labs     Order Current Status    Blood Culture - Blood, Arm, Right Preliminary result    Blood Culture - Blood, Hand, Right Preliminary result         Discharge Details        Discharge Medications      New Medications      Instructions Start Date   aspirin 81 MG EC tablet   81 mg, Oral, Daily   Start Date: September 4, 2022     carvedilol 6.25 MG tablet  Commonly known as: COREG   6.25 mg, Oral, 2 Times Daily With Meals      rivaroxaban 20 MG tablet  Commonly known as: XARELTO   20 mg, Oral, Daily With Dinner      torsemide 10 MG tablet  Commonly known as: DEMADEX   10 mg, Oral, Daily   Start Date: September 4, 2022     valsartan 40 MG tablet  Commonly known as: DIOVAN   40 mg, Oral, Daily         Continue These Medications      Instructions Start Date   desvenlafaxine 100 MG 24 hr tablet  Commonly known as: PRISTIQ   100 mg, Oral, Every Morning      lisdexamfetamine 60 MG capsule  Commonly known as: VYVANSE   60 mg, Oral, Every Morning         Stop These Medications    diclofenac 75 MG EC tablet  Commonly known as: VOLTAREN     ibuprofen 200 MG tablet  Commonly known as: ADVIL,MOTRIN            No Known Allergies      Discharge Disposition:      Diet:  Hospital:  Diet Order   Procedures   • Diet Regular; Cardiac       Activity:  Activity Instructions     Activity as Tolerated            Restrictions or Other Recommendations:         CODE STATUS:    Code Status and Medical Interventions:   Ordered at: 08/30/22 0942     Level Of Support Discussed With:    Patient     Code Status (Patient has no pulse and is not breathing):    CPR (Attempt to Resuscitate)     Medical Interventions (Patient has pulse or is breathing):    Full Support       Future Appointments   Date Time Provider Department Center   9/8/2022  9:15 AM Sandee Keen PA-C [facility] Kathryn L Seward, MD  09/03/22      Time Spent on Discharge:  I spent  40  minutes on this discharge activity which included: face-to-face encounter with the patient, reviewing the data in the system, coordination of the care with the nursing staff as well as consultants, documentation,  and entering orders.

## 2022-09-03 NOTE — PLAN OF CARE
Goal Outcome Evaluation:  Plan of Care Reviewed With: patient        Progress: no change  Outcome Evaluation: PT eval completed. Patient demonstrates safety with mobility and gait. She demonstrates safety awareness and adequate dynamic balance and activity tolerance. Patient does not need skilled PT services at this time.

## 2022-09-04 ENCOUNTER — READMISSION MANAGEMENT (OUTPATIENT)
Dept: CALL CENTER | Facility: HOSPITAL | Age: 57
End: 2022-09-04

## 2022-09-04 LAB
BACTERIA SPEC AEROBE CULT: NORMAL
BACTERIA SPEC AEROBE CULT: NORMAL

## 2022-09-04 NOTE — OUTREACH NOTE
Prep Survey    Flowsheet Row Responses   [facility] patient discharged from? [facility]   Is LACE score < 7 ? No   Emergency Room discharge w/ pulse ox? No   Eligibility Readm Mgmt   Discharge diagnosis CHF   Does the patient have one of the following disease processes/diagnoses(primary or secondary)? CHF   Does the patient have Home health ordered? No   Is there a DME ordered? No   Prep survey completed? Yes          IGOR ARELLANO - Registered Nurse

## 2022-09-06 ENCOUNTER — TRANSITIONAL CARE MANAGEMENT TELEPHONE ENCOUNTER (OUTPATIENT)
Dept: CALL CENTER | Facility: HOSPITAL | Age: 57
End: 2022-09-06

## 2022-09-06 NOTE — OUTREACH NOTE
Call Center TCM Note    Flowsheet Row Responses   [facility] patient discharged from? [facility]   Does the patient have one of the following disease processes/diagnoses(primary or secondary)? CHF   TCM attempt successful? No   Unsuccessful attempts Attempt 2           Kavitha Gardner RN    9/6/2022, 13:07 EDT

## 2022-09-06 NOTE — OUTREACH NOTE
Call Center TCM Note    Flowsheet Row Responses   [facility] patient discharged from? [place]   Does the patient have one of the following disease processes/diagnoses(primary or secondary)? CHF   TCM attempt successful? No   Unsuccessful attempts Attempt 1           Kavitha Gardner RN    9/6/2022, 08:42 EDT

## 2022-09-07 ENCOUNTER — TRANSITIONAL CARE MANAGEMENT TELEPHONE ENCOUNTER (OUTPATIENT)
Dept: CALL CENTER | Facility: HOSPITAL | Age: 57
End: 2022-09-07

## 2022-09-07 NOTE — OUTREACH NOTE
Call Center TCM Note    Flowsheet Row Responses   [facility] patient discharged from? [facility]   Does the patient have one of the following disease processes/diagnoses(primary or secondary)? CHF   TCM attempt successful? No   Unsuccessful attempts Attempt 3           Jennifer Gill LPN    9/7/2022, 13:08 EDT

## 2022-09-08 ENCOUNTER — OFFICE VISIT (OUTPATIENT)
Dept: FAMILY MEDICINE CLINIC | Facility: CLINIC | Age: 57
End: 2022-09-08

## 2022-09-08 ENCOUNTER — LAB (OUTPATIENT)
Dept: LAB | Facility: HOSPITAL | Age: 57
End: 2022-09-08

## 2022-09-08 ENCOUNTER — PATIENT ROUNDING (BHMG ONLY) (OUTPATIENT)
Dept: FAMILY MEDICINE CLINIC | Facility: CLINIC | Age: 57
End: 2022-09-08

## 2022-09-08 VITALS
BODY MASS INDEX: 23.06 KG/M2 | SYSTOLIC BLOOD PRESSURE: 126 MMHG | TEMPERATURE: 98.5 F | OXYGEN SATURATION: 98 % | HEIGHT: 65 IN | WEIGHT: 138.4 LBS | HEART RATE: 96 BPM | DIASTOLIC BLOOD PRESSURE: 84 MMHG

## 2022-09-08 DIAGNOSIS — Z72.0 TOBACCO USE: ICD-10-CM

## 2022-09-08 DIAGNOSIS — I42.8 NICM (NONISCHEMIC CARDIOMYOPATHY): ICD-10-CM

## 2022-09-08 DIAGNOSIS — F98.8 ATTENTION DEFICIT DISORDER (ADD) WITHOUT HYPERACTIVITY: ICD-10-CM

## 2022-09-08 DIAGNOSIS — R20.9 DISTURBANCE OF SKIN SENSATION: ICD-10-CM

## 2022-09-08 DIAGNOSIS — N28.9 RENAL INSUFFICIENCY: ICD-10-CM

## 2022-09-08 DIAGNOSIS — Z78.0 POSTMENOPAUSAL: ICD-10-CM

## 2022-09-08 DIAGNOSIS — Z76.89 ENCOUNTER TO ESTABLISH CARE: Primary | ICD-10-CM

## 2022-09-08 DIAGNOSIS — I50.22 CHRONIC SYSTOLIC HEART FAILURE: ICD-10-CM

## 2022-09-08 DIAGNOSIS — K83.8 COMMON BILE DUCT DILATION: ICD-10-CM

## 2022-09-08 DIAGNOSIS — M85.88 OTHER SPECIFIED DISORDERS OF BONE DENSITY AND STRUCTURE, OTHER SITE: ICD-10-CM

## 2022-09-08 DIAGNOSIS — F33.0 MILD EPISODE OF RECURRENT MAJOR DEPRESSIVE DISORDER: ICD-10-CM

## 2022-09-08 DIAGNOSIS — Z12.11 SCREEN FOR COLON CANCER: ICD-10-CM

## 2022-09-08 DIAGNOSIS — R79.89 ELEVATED LFTS: ICD-10-CM

## 2022-09-08 DIAGNOSIS — R73.03 PREDIABETES: ICD-10-CM

## 2022-09-08 DIAGNOSIS — B37.0 THRUSH, ORAL: ICD-10-CM

## 2022-09-08 DIAGNOSIS — Z12.31 ENCOUNTER FOR SCREENING MAMMOGRAM FOR MALIGNANT NEOPLASM OF BREAST: ICD-10-CM

## 2022-09-08 DIAGNOSIS — F41.9 ANXIETY: ICD-10-CM

## 2022-09-08 DIAGNOSIS — Z09 HOSPITAL DISCHARGE FOLLOW-UP: ICD-10-CM

## 2022-09-08 PROBLEM — J44.9 CHRONIC OBSTRUCTIVE PULMONARY DISEASE (COPD): Status: ACTIVE | Noted: 2022-09-08

## 2022-09-08 PROBLEM — I10 PRIMARY HYPERTENSION: Status: ACTIVE | Noted: 2022-09-08

## 2022-09-08 LAB
ANION GAP SERPL CALCULATED.3IONS-SCNC: 12 MMOL/L (ref 5–15)
BUN SERPL-MCNC: 20 MG/DL (ref 6–20)
BUN/CREAT SERPL: 12.7 (ref 7–25)
CALCIUM SPEC-SCNC: 9.4 MG/DL (ref 8.6–10.5)
CHLORIDE SERPL-SCNC: 98 MMOL/L (ref 98–107)
CO2 SERPL-SCNC: 29 MMOL/L (ref 22–29)
CREAT SERPL-MCNC: 1.58 MG/DL (ref 0.57–1)
EGFRCR SERPLBLD CKD-EPI 2021: 38 ML/MIN/1.73
FOLATE SERPL-MCNC: 8.89 NG/ML (ref 4.78–24.2)
GLUCOSE SERPL-MCNC: 82 MG/DL (ref 65–99)
IRON 24H UR-MRATE: 46 MCG/DL (ref 37–145)
IRON SATN MFR SERPL: 11 % (ref 20–50)
POTASSIUM SERPL-SCNC: 4.6 MMOL/L (ref 3.5–5.2)
SODIUM SERPL-SCNC: 139 MMOL/L (ref 136–145)
TIBC SERPL-MCNC: 432 MCG/DL (ref 298–536)
TRANSFERRIN SERPL-MCNC: 290 MG/DL (ref 200–360)
VIT B12 BLD-MCNC: 571 PG/ML (ref 211–946)

## 2022-09-08 PROCEDURE — 80048 BASIC METABOLIC PNL TOTAL CA: CPT

## 2022-09-08 PROCEDURE — 84466 ASSAY OF TRANSFERRIN: CPT

## 2022-09-08 PROCEDURE — 83540 ASSAY OF IRON: CPT

## 2022-09-08 PROCEDURE — 82607 VITAMIN B-12: CPT

## 2022-09-08 PROCEDURE — 82746 ASSAY OF FOLIC ACID SERUM: CPT

## 2022-09-08 PROCEDURE — 86038 ANTINUCLEAR ANTIBODIES: CPT

## 2022-09-08 PROCEDURE — 99496 TRANSJ CARE MGMT HIGH F2F 7D: CPT | Performed by: PHYSICIAN ASSISTANT

## 2022-09-08 RX ORDER — NICOTINE 21 MG/24HR
1 PATCH, TRANSDERMAL 24 HOURS TRANSDERMAL EVERY 24 HOURS
Qty: 28 EACH | Refills: 1 | Status: SHIPPED | OUTPATIENT
Start: 2022-09-08 | End: 2023-02-15

## 2022-09-08 RX ORDER — KETOCONAZOLE 20 MG/G
CREAM TOPICAL
COMMUNITY
End: 2022-09-30

## 2022-09-08 RX ORDER — IBUPROFEN 200 MG
TABLET ORAL EVERY 8 HOURS SCHEDULED
COMMUNITY
End: 2022-09-08

## 2022-09-08 NOTE — PROGRESS NOTES
Transitional Care Follow Up Visit  Subjective     Nae Sanchez is a 57 y.o. female who presents for a transitional care management visit.    Within 48 business hours after discharge our office contacted her via telephone to coordinate her care and needs.      I reviewed and discussed the details of that call along with the discharge summary, hospital problems, inpatient lab results, inpatient diagnostic studies, and consultation reports with Nae.     Current outpatient and discharge medications have been reconciled for the patient.    Date of TCM Phone Call 9/2/2022   [facility]   Date of Admission 8/30/2022   Date of Discharge 9/3/2022   Discharge Disposition Home or Self Care     Risk for Readmission (LACE) Score: 9 (9/3/2022  6:01 AM)      Patient presents for routine hospital follow-up after being admitted to [facility] from 8/32 9/3.  She presented to the ED for shortness of breath and swelling.  She was found to have an acute heart failure.  She underwent a cardiac cath while hospitalized.  She was started on valsartan daily, Coreg 6.25 mg twice daily, torsemide 10 mg daily and Xarelto.  She is supposed to follow-up with the heart valve clinic.  She is currently smoking and is aware she needs to quit.  Other findings during her hospitalization included renal insufficiency, elevated LFTs and common biliary duct dilation.  Patient reports that she is feeling better overall.  She is compliant on her medications.  She has not heard about her referral to cardiology yet.  She does report new onset peripheral tingling in her hands and feet.  Describes it as a burning sensation.  This recently started in the last few weeks.  She does report a smoking history of 2 packs a day for 40 years.  She is currently using the NicoDerm patch 21 mg daily.  She states that she got scared while being in the hospital and has decided to quit smoking permanently.  Her daughter is present at appointment  today.     Duration of Hospital Stay:  08/30/2022 to 09/03/2022     The following portions of the patient's history were reviewed and updated as appropriate: allergies, current medications, past family history, past medical history, past social history, past surgical history and problem list.    Current outpatient and discharge medications have been reconciled for the patient.  Reviewed by: Sandee Keen PA-C    Review of Systems   Constitutional: Positive for fatigue. Negative for activity change, appetite change, chills, diaphoresis, fever, unexpected weight gain and unexpected weight loss.   HENT: Negative for congestion, dental problem, ear pain, hearing loss, nosebleeds, sinus pressure, sore throat and trouble swallowing.         Dry mouth   Eyes: Negative for blurred vision, pain, redness and visual disturbance.   Respiratory: Positive for shortness of breath. Negative for apnea, cough, chest tightness and wheezing.    Cardiovascular: Positive for leg swelling. Negative for chest pain and palpitations.   Gastrointestinal: Negative for abdominal distention, abdominal pain, anal bleeding, blood in stool, constipation, diarrhea, nausea, vomiting, GERD and indigestion.   Endocrine: Negative for cold intolerance, heat intolerance, polydipsia, polyphagia and polyuria.   Genitourinary: Negative for decreased urine volume, difficulty urinating, dysuria, frequency, hematuria, urgency and urinary incontinence.   Musculoskeletal: Negative for gait problem, joint swelling and bursitis.   Skin: Negative for dry skin, rash, skin lesions and wound.   Neurological: Positive for numbness. Negative for dizziness, tremors, seizures, syncope, speech difficulty, weakness, light-headedness, headache, memory problem and confusion.   Hematological: Does not bruise/bleed easily.   Psychiatric/Behavioral: Positive for depressed mood and stress. Negative for agitation, behavioral problems, decreased concentration,  hallucinations, sleep disturbance and suicidal ideas. The patient is not nervous/anxious.        Current Outpatient Medications on File Prior to Visit   Medication Sig Dispense Refill   • aspirin 81 MG EC tablet Take 1 tablet by mouth Daily. 30 tablet 6   • carvedilol (COREG) 6.25 MG tablet Take 1 tablet by mouth 2 (Two) Times a Day With Meals. 60 tablet 6   • desvenlafaxine (PRISTIQ) 100 MG 24 hr tablet Take 100 mg by mouth Every Morning.     • ketoconazole (NIZORAL) 2 % cream ketoconazole 2 % topical cream   APPLY TO THE AFFECTED AREA(S) BY TOPICAL ROUTE ONCE DAILY     • lisdexamfetamine (VYVANSE) 60 MG capsule Take 60 mg by mouth Every Morning     • rivaroxaban (XARELTO) 20 MG tablet Take 1 tablet by mouth Daily With Dinner. Indications: Other - full anticoagulation 30 tablet 6   • torsemide (DEMADEX) 10 MG tablet Take 1 tablet by mouth Daily. 30 tablet 6   • valsartan (DIOVAN) 40 MG tablet Take 1 tablet by mouth Daily. 30 tablet 6   • [DISCONTINUED] ibuprofen (ADVIL,MOTRIN) 200 MG tablet Every 8 (Eight) Hours.       No current facility-administered medications on file prior to visit.       Results for orders placed or performed during the hospital encounter of 08/30/22   Blood Culture - Blood, Hand, Right    Specimen: Hand, Right; Blood   Result Value Ref Range    Blood Culture No growth at 5 days    Blood Culture - Blood, Arm, Right    Specimen: Arm, Right; Blood   Result Value Ref Range    Blood Culture No growth at 5 days    Respiratory Panel PCR w/COVID-19(SARS-CoV-2) SIDNEY/GERA/CAMRON/PAD/COR/MAD/CITLALI In-House, NP Swab in UTM/VTM, 3-4 HR TAT - Swab, Nasopharynx    Specimen: Nasopharynx; Swab   Result Value Ref Range    ADENOVIRUS, PCR Not Detected Not Detected    Coronavirus 229E Not Detected Not Detected    Coronavirus HKU1 Not Detected Not Detected    Coronavirus NL63 Not Detected Not Detected    Coronavirus OC43 Not Detected Not Detected    COVID19 Not Detected Not Detected - Ref. Range    Human Metapneumovirus  Not Detected Not Detected    Human Rhinovirus/Enterovirus Not Detected Not Detected    Influenza A PCR Not Detected Not Detected    Influenza B PCR Not Detected Not Detected    Parainfluenza Virus 1 Not Detected Not Detected    Parainfluenza Virus 2 Not Detected Not Detected    Parainfluenza Virus 3 Not Detected Not Detected    Parainfluenza Virus 4 Not Detected Not Detected    RSV, PCR Not Detected Not Detected    Bordetella pertussis pcr Not Detected Not Detected    Bordetella parapertussis PCR Not Detected Not Detected    Chlamydophila pneumoniae PCR Not Detected Not Detected    Mycoplasma pneumo by PCR Not Detected Not Detected   Comprehensive Metabolic Panel    Specimen: Blood   Result Value Ref Range    Glucose 127 (H) 65 - 99 mg/dL    BUN 13 6 - 20 mg/dL    Creatinine 1.15 (H) 0.57 - 1.00 mg/dL    Sodium 140 136 - 145 mmol/L    Potassium 4.1 3.5 - 5.2 mmol/L    Chloride 102 98 - 107 mmol/L    CO2 23.0 22.0 - 29.0 mmol/L    Calcium 8.9 8.6 - 10.5 mg/dL    Total Protein 6.6 6.0 - 8.5 g/dL    Albumin 3.90 3.50 - 5.20 g/dL    ALT (SGPT) 42 (H) 1 - 33 U/L    AST (SGOT) 75 (H) 1 - 32 U/L    Alkaline Phosphatase 170 (H) 39 - 117 U/L    Total Bilirubin 1.5 (H) 0.0 - 1.2 mg/dL    Globulin 2.7 gm/dL    A/G Ratio 1.4 g/dL    BUN/Creatinine Ratio 11.3 7.0 - 25.0    Anion Gap 15.0 5.0 - 15.0 mmol/L    eGFR 55.7 (L) >60.0 mL/min/1.73   BNP    Specimen: Blood   Result Value Ref Range    proBNP 49,288.0 (H) 0.0 - 900.0 pg/mL   Troponin    Specimen: Blood   Result Value Ref Range    Troponin T 0.551 (C) 0.000 - 0.030 ng/mL   CBC Auto Differential    Specimen: Blood   Result Value Ref Range    WBC 18.37 (H) 3.40 - 10.80 10*3/mm3    RBC 4.79 3.77 - 5.28 10*6/mm3    Hemoglobin 13.2 12.0 - 15.9 g/dL    Hematocrit 40.0 34.0 - 46.6 %    MCV 83.5 79.0 - 97.0 fL    MCH 27.6 26.6 - 33.0 pg    MCHC 33.0 31.5 - 35.7 g/dL    RDW 20.6 (H) 12.3 - 15.4 %    RDW-SD 60.1 (H) 37.0 - 54.0 fl    MPV 11.2 6.0 - 12.0 fL    Platelets 504 (H) 140 -  450 10*3/mm3    Neutrophil % 80.1 (H) 42.7 - 76.0 %    Lymphocyte % 10.9 (L) 19.6 - 45.3 %    Monocyte % 6.0 5.0 - 12.0 %    Eosinophil % 1.0 0.3 - 6.2 %    Basophil % 0.9 0.0 - 1.5 %    Immature Grans % 1.1 (H) 0.0 - 0.5 %    Neutrophils, Absolute 14.70 (H) 1.70 - 7.00 10*3/mm3    Lymphocytes, Absolute 2.01 0.70 - 3.10 10*3/mm3    Monocytes, Absolute 1.11 (H) 0.10 - 0.90 10*3/mm3    Eosinophils, Absolute 0.19 0.00 - 0.40 10*3/mm3    Basophils, Absolute 0.16 0.00 - 0.20 10*3/mm3    Immature Grans, Absolute 0.20 (H) 0.00 - 0.05 10*3/mm3    nRBC 1.1 (H) 0.0 - 0.2 /100 WBC   Scan Slide    Specimen: Blood   Result Value Ref Range    Anisocytosis Mod/2+ None Seen    Ovalocytes Slight/1+ None Seen    Polychromasia Slight/1+ None Seen    WBC Morphology Normal Normal    Platelet Morphology Normal Normal   Procalcitonin    Specimen: Blood   Result Value Ref Range    Procalcitonin 0.05 0.00 - 0.25 ng/mL   Lactic Acid, Plasma    Specimen: Blood   Result Value Ref Range    Lactate 1.8 0.5 - 2.0 mmol/L   Urinalysis With Culture If Indicated - Urine, Clean Catch    Specimen: Urine, Clean Catch   Result Value Ref Range    Color, UA Yellow Yellow, Straw    Appearance, UA Clear Clear    pH, UA 6.0 5.0 - 8.0    Specific Gravity, UA 1.039 (H) 1.001 - 1.030    Glucose, UA Negative Negative    Ketones, UA Negative Negative    Bilirubin, UA Negative Negative    Blood, UA Negative Negative    Protein, UA Trace (A) Negative    Leuk Esterase, UA Negative Negative    Nitrite, UA Negative Negative    Urobilinogen, UA 1.0 E.U./dL 0.2 - 1.0 E.U./dL   Vitamin B12    Specimen: Blood   Result Value Ref Range    Vitamin B-12 521 211 - 946 pg/mL   TSH    Specimen: Blood   Result Value Ref Range    TSH 3.530 0.270 - 4.200 uIU/mL   CBC (No Diff)    Specimen: Blood   Result Value Ref Range    WBC 17.27 (H) 3.40 - 10.80 10*3/mm3    RBC 4.62 3.77 - 5.28 10*6/mm3    Hemoglobin 12.4 12.0 - 15.9 g/dL    Hematocrit 38.6 34.0 - 46.6 %    MCV 83.5 79.0 - 97.0  fL    MCH 26.8 26.6 - 33.0 pg    MCHC 32.1 31.5 - 35.7 g/dL    RDW 20.6 (H) 12.3 - 15.4 %    RDW-SD 59.1 (H) 37.0 - 54.0 fl    MPV 11.6 6.0 - 12.0 fL    Platelets 403 140 - 450 10*3/mm3   Comprehensive Metabolic Panel    Specimen: Blood   Result Value Ref Range    Glucose 107 (H) 65 - 99 mg/dL    BUN 18 6 - 20 mg/dL    Creatinine 1.32 (H) 0.57 - 1.00 mg/dL    Sodium 143 136 - 145 mmol/L    Potassium 3.4 (L) 3.5 - 5.2 mmol/L    Chloride 101 98 - 107 mmol/L    CO2 33.0 (H) 22.0 - 29.0 mmol/L    Calcium 8.4 (L) 8.6 - 10.5 mg/dL    Total Protein 5.8 (L) 6.0 - 8.5 g/dL    Albumin 3.40 (L) 3.50 - 5.20 g/dL    ALT (SGPT) 37 (H) 1 - 33 U/L    AST (SGOT) 90 (H) 1 - 32 U/L    Alkaline Phosphatase 147 (H) 39 - 117 U/L    Total Bilirubin 0.9 0.0 - 1.2 mg/dL    Globulin 2.4 gm/dL    A/G Ratio 1.4 g/dL    BUN/Creatinine Ratio 13.6 7.0 - 25.0    Anion Gap 9.0 5.0 - 15.0 mmol/L    eGFR 47.2 (L) >60.0 mL/min/1.73   TSH    Specimen: Blood   Result Value Ref Range    TSH 1.380 0.270 - 4.200 uIU/mL   Lipid Panel    Specimen: Blood   Result Value Ref Range    Total Cholesterol 112 0 - 200 mg/dL    Triglycerides 82 0 - 150 mg/dL    HDL Cholesterol 30 (L) 40 - 60 mg/dL    LDL Cholesterol  66 0 - 100 mg/dL    VLDL Cholesterol 16 5 - 40 mg/dL    LDL/HDL Ratio 2.19    Hemoglobin A1c    Specimen: Blood   Result Value Ref Range    Hemoglobin A1C 5.70 (H) 4.80 - 5.60 %   Magnesium    Specimen: Blood   Result Value Ref Range    Magnesium 1.8 1.6 - 2.6 mg/dL   Troponin    Specimen: Blood   Result Value Ref Range    Troponin T 0.983 (C) 0.000 - 0.030 ng/mL   Magnesium    Specimen: Blood   Result Value Ref Range    Magnesium 1.7 1.6 - 2.6 mg/dL   Magnesium    Specimen: Blood   Result Value Ref Range    Magnesium 1.6 1.6 - 2.6 mg/dL   Comprehensive Metabolic Panel    Specimen: Blood   Result Value Ref Range    Glucose 99 65 - 99 mg/dL    BUN 23 (H) 6 - 20 mg/dL    Creatinine 1.23 (H) 0.57 - 1.00 mg/dL    Sodium 142 136 - 145 mmol/L    Potassium 3.6  3.5 - 5.2 mmol/L    Chloride 97 (L) 98 - 107 mmol/L    CO2 33.0 (H) 22.0 - 29.0 mmol/L    Calcium 8.6 8.6 - 10.5 mg/dL    Total Protein 6.2 6.0 - 8.5 g/dL    Albumin 3.70 3.50 - 5.20 g/dL    ALT (SGPT) 33 1 - 33 U/L    AST (SGOT) 54 (H) 1 - 32 U/L    Alkaline Phosphatase 144 (H) 39 - 117 U/L    Total Bilirubin 1.0 0.0 - 1.2 mg/dL    Globulin 2.5 gm/dL    A/G Ratio 1.5 g/dL    BUN/Creatinine Ratio 18.7 7.0 - 25.0    Anion Gap 12.0 5.0 - 15.0 mmol/L    eGFR 51.4 (L) >60.0 mL/min/1.73   CBC Auto Differential    Specimen: Blood   Result Value Ref Range    WBC 15.82 (H) 3.40 - 10.80 10*3/mm3    RBC 4.52 3.77 - 5.28 10*6/mm3    Hemoglobin 12.3 12.0 - 15.9 g/dL    Hematocrit 36.8 34.0 - 46.6 %    MCV 81.4 79.0 - 97.0 fL    MCH 27.2 26.6 - 33.0 pg    MCHC 33.4 31.5 - 35.7 g/dL    RDW 20.3 (H) 12.3 - 15.4 %    RDW-SD 57.1 (H) 37.0 - 54.0 fl    MPV 10.7 6.0 - 12.0 fL    Platelets 333 140 - 450 10*3/mm3    Neutrophil % 74.9 42.7 - 76.0 %    Lymphocyte % 15.2 (L) 19.6 - 45.3 %    Monocyte % 6.5 5.0 - 12.0 %    Eosinophil % 1.7 0.3 - 6.2 %    Basophil % 0.8 0.0 - 1.5 %    Immature Grans % 0.9 (H) 0.0 - 0.5 %    Neutrophils, Absolute 11.84 (H) 1.70 - 7.00 10*3/mm3    Lymphocytes, Absolute 2.40 0.70 - 3.10 10*3/mm3    Monocytes, Absolute 1.03 (H) 0.10 - 0.90 10*3/mm3    Eosinophils, Absolute 0.27 0.00 - 0.40 10*3/mm3    Basophils, Absolute 0.13 0.00 - 0.20 10*3/mm3    Immature Grans, Absolute 0.15 (H) 0.00 - 0.05 10*3/mm3    nRBC 0.0 0.0 - 0.2 /100 WBC   Phosphorus    Specimen: Blood   Result Value Ref Range    Phosphorus 3.5 2.5 - 4.5 mg/dL   Magnesium    Specimen: Blood   Result Value Ref Range    Magnesium 1.6 1.6 - 2.6 mg/dL   Comprehensive Metabolic Panel    Specimen: Blood   Result Value Ref Range    Glucose 123 (H) 65 - 99 mg/dL    BUN 23 (H) 6 - 20 mg/dL    Creatinine 1.41 (H) 0.57 - 1.00 mg/dL    Sodium 137 136 - 145 mmol/L    Potassium 3.5 3.5 - 5.2 mmol/L    Chloride 94 (L) 98 - 107 mmol/L    CO2 32.0 (H) 22.0 - 29.0  mmol/L    Calcium 8.1 (L) 8.6 - 10.5 mg/dL    Total Protein 5.8 (L) 6.0 - 8.5 g/dL    Albumin 3.10 (L) 3.50 - 5.20 g/dL    ALT (SGPT) 26 1 - 33 U/L    AST (SGOT) 37 (H) 1 - 32 U/L    Alkaline Phosphatase 134 (H) 39 - 117 U/L    Total Bilirubin 0.7 0.0 - 1.2 mg/dL    Globulin 2.7 gm/dL    A/G Ratio 1.1 g/dL    BUN/Creatinine Ratio 16.3 7.0 - 25.0    Anion Gap 11.0 5.0 - 15.0 mmol/L    eGFR 43.6 (L) >60.0 mL/min/1.73   CBC Auto Differential    Specimen: Blood   Result Value Ref Range    WBC 12.36 (H) 3.40 - 10.80 10*3/mm3    RBC 4.32 3.77 - 5.28 10*6/mm3    Hemoglobin 11.7 (L) 12.0 - 15.9 g/dL    Hematocrit 36.4 34.0 - 46.6 %    MCV 84.3 79.0 - 97.0 fL    MCH 27.1 26.6 - 33.0 pg    MCHC 32.1 31.5 - 35.7 g/dL    RDW 20.1 (H) 12.3 - 15.4 %    RDW-SD 60.0 (H) 37.0 - 54.0 fl    MPV 11.8 6.0 - 12.0 fL    Platelets 347 140 - 450 10*3/mm3    Neutrophil % 70.4 42.7 - 76.0 %    Lymphocyte % 18.7 (L) 19.6 - 45.3 %    Monocyte % 7.3 5.0 - 12.0 %    Eosinophil % 1.7 0.3 - 6.2 %    Basophil % 0.9 0.0 - 1.5 %    Immature Grans % 1.0 (H) 0.0 - 0.5 %    Neutrophils, Absolute 8.71 (H) 1.70 - 7.00 10*3/mm3    Lymphocytes, Absolute 2.31 0.70 - 3.10 10*3/mm3    Monocytes, Absolute 0.90 0.10 - 0.90 10*3/mm3    Eosinophils, Absolute 0.21 0.00 - 0.40 10*3/mm3    Basophils, Absolute 0.11 0.00 - 0.20 10*3/mm3    Immature Grans, Absolute 0.12 (H) 0.00 - 0.05 10*3/mm3    nRBC 0.0 0.0 - 0.2 /100 WBC   Scan Slide    Specimen: Blood   Result Value Ref Range    Anisocytosis Slight/1+ None Seen    WBC Morphology Normal Normal    Platelet Morphology Normal Normal   Magnesium    Specimen: Blood   Result Value Ref Range    Magnesium 2.5 1.6 - 2.6 mg/dL   Renal Function Panel    Specimen: Blood   Result Value Ref Range    Glucose 134 (H) 65 - 99 mg/dL    BUN 25 (H) 6 - 20 mg/dL    Creatinine 1.42 (H) 0.57 - 1.00 mg/dL    Sodium 135 (L) 136 - 145 mmol/L    Potassium 4.4 3.5 - 5.2 mmol/L    Chloride 96 (L) 98 - 107 mmol/L    CO2 31.0 (H) 22.0 - 29.0  mmol/L    Calcium 8.6 8.6 - 10.5 mg/dL    Albumin 3.50 3.50 - 5.20 g/dL    Phosphorus 2.7 2.5 - 4.5 mg/dL    Anion Gap 8.0 5.0 - 15.0 mmol/L    BUN/Creatinine Ratio 17.6 7.0 - 25.0    eGFR 43.2 (L) >60.0 mL/min/1.73   POCT, Creatinine    Specimen: Blood   Result Value Ref Range    Creatinine 1.20 mg/dL   POC Creatinine    Specimen: Blood   Result Value Ref Range    Creatinine 1.20 0.60 - 1.30 mg/dL   POC Activated Clotting Time    Specimen: Blood   Result Value Ref Range    Activated Clotting Time  237 (H) 82 - 152 Seconds   ECG 12 Lead   Result Value Ref Range    QT Interval 362 ms    QTC Interval 469 ms   ECG 12 Lead   Result Value Ref Range    QT Interval 472 ms    QTC Interval 490 ms   ECG 12 Lead   Result Value Ref Range    QT Interval 418 ms    QTC Interval 457 ms   Adult Transthoracic Echo Complete W/ Cont if Necessary Per Protocol   Result Value Ref Range    Target HR (85%) 139 bpm    Max. Pred. HR (100%) 163 bpm    BH CV VAS BP LEFT /94 mmHg    RV S' 9.1 cm/sec    RV Base 4.0 cm    RV Length 5.1 cm    RV Mid 2.5 cm    LA ESV Index (BP) 38.0 ml/m2    Avg E/e' ratio 18.08     Ao root diam 2.6 cm    EDV(cubed) 227.0 ml    EDV(MOD-sp2) 170.0 ml    EDV(MOD-sp4) 175.0 ml    EF(MOD-bp) 33.9 %    EF(MOD-sp2) 31.8 %    EF(MOD-sp4) 41.1 %    ESV(cubed) 166.4 ml    ESV(MOD-sp2) 116.0 ml    ESV(MOD-sp4) 103.0 ml    IVS/LVPW 1.00 cm    Lat Peak E' Bishop 4.1 cm/sec    LV mass(C)d 191.6 grams    LV V1 max PG 1.50 mmHg    LV V1 mean PG 1.00 mmHg    LV V1 max 61.3 cm/sec    LVPWd 0.80 cm    Med Peak E' Bishop 3.2 cm/sec    MV dec slope 382.0 cm/sec2    MV P1/2t 63.9 msec    MV V2 VTI 10.5 cm    MVA(VTI) 2.05 cm2    PA acc time 0.10 sec    PA pr(Accel) 36.3 mmHg    PI end-d bishop 168.0 cm/sec    SV(LVOT) 21.5 ml    SV(MOD-sp2) 54.0 ml    SV(MOD-sp4) 72.0 ml    AI P1/2t 466.1 msec    FS 9.8 %    IVSd 0.80 cm    LA dimension (2D)  4.4 cm    LV V1 VTI 8.5 cm    LVIDd 6.1 cm    LVIDs 5.5 cm    LVOT area 2.5 cm2    LVOT diam 1.80  "cm    MV E/A 0.82     MV max PG 2.41 mmHg    MV mean PG 1.00 mmHg    MVA(P1/2t) 3.4 cm2    MV A max roxie 80.2 cm/sec    MV E max roxie 66.0 cm/sec    TAPSE (>1.6) 1.11 cm    Ascending aorta 2.5 cm    Echo EF Estimated 25 %   Green Top (Gel)   Result Value Ref Range    Extra Tube Hold for add-ons.    Lavender Top   Result Value Ref Range    Extra Tube hold for add-on    Gold Top - SST   Result Value Ref Range    Extra Tube Hold for add-ons.    Gray Top   Result Value Ref Range    Extra Tube Hold for add-ons.    Light Blue Top   Result Value Ref Range    Extra Tube Hold for add-ons.        Visit Vitals  /84 (BP Location: Left arm, Patient Position: Sitting, Cuff Size: Adult)   Pulse 96   Temp 98.5 °F (36.9 °C)   Ht 165.1 cm (65\")   Wt 62.8 kg (138 lb 6.4 oz)   SpO2 98%   BMI 23.03 kg/m²     Body mass index is 23.03 kg/m².    Objective   Physical Exam  Vitals reviewed.   Constitutional:       General: She is not in acute distress.     Appearance: Normal appearance. She is well-developed and normal weight. She is not ill-appearing or diaphoretic.   HENT:      Head: Normocephalic and atraumatic.      Mouth/Throat:      Mouth: Mucous membranes are dry. No oral lesions.      Dentition: Abnormal dentition. No gum lesions.      Comments: thrush  Eyes:      Extraocular Movements: Extraocular movements intact.      Conjunctiva/sclera: Conjunctivae normal.   Cardiovascular:      Rate and Rhythm: Normal rate and regular rhythm.      Heart sounds: Normal heart sounds.   Pulmonary:      Effort: No respiratory distress.   Musculoskeletal:         General: Normal range of motion.      Cervical back: Normal range of motion.      Right lower leg: No edema.      Left lower leg: No edema.   Skin:     General: Skin is warm.      Findings: No erythema or rash.   Neurological:      General: No focal deficit present.      Mental Status: She is alert.   Psychiatric:         Attention and Perception: Attention and perception normal. She is " attentive.         Mood and Affect: Mood is anxious and depressed.         Speech: Speech normal.         Behavior: Behavior normal. Behavior is cooperative.         Thought Content: Thought content normal.         Cognition and Memory: Cognition and memory normal.         Judgment: Judgment normal.         Assessment & Plan   Diagnoses and all orders for this visit:    1. Encounter to establish care (Primary)    2. Hospital discharge follow-up    3. NICM (nonischemic cardiomyopathy) (HCC)  -     Ambulatory Referral to [facility] [place] - GERA    4. Chronic systolic heart failure (CMS/HCC)  -     Ambulatory Referral to [facility] [place] - GERA  Needs follow-up with cardiology.  Denies shortness of breath, leg swelling or smothering currently.  Compliant on all medications.  BP is normal.  Monitoring at home.  Encouraged to monitor weight at home and call if she has weight gain of 3-5 lbs.    5. Renal insufficiency  -     Basic Metabolic Panel; Future    6. Elevated LFTs  -     Ambulatory Referral to Gastroenterology    7. Common bile duct dilation  -     Ambulatory Referral to Gastroenterology    8. Disturbance of skin sensation  -     Vitamin B12 & Folate; Future  -     Iron Profile; Future  -     NATALIYA With / DsDNA, RNP, Sjogrens A / B, Kay; Future  Possibly neuropathy vs. Vascular disease.  Will order labs.    9. Prediabetes  Recent hemoglobin AIC was 5.7%.  Admits to drinking 4 glasses of sweat tea a day.  Recommend trying to reduce this to 2 glasses a day.    10. Tobacco use  -     nicotine (Nicoderm CQ) 21 MG/24HR patch; Place 1 patch on the skin as directed by provider Daily.  Dispense: 28 each; Refill: 1  Encouraged patient to continue to remain off of cigarettes.  Keep using patch.  Call when she needs refill for lower dose, 14 mg, patch.    11. Thrush, oral  -     nystatin (MYCOSTATIN) 100,000 unit/mL suspension; Swish and swallow 5 mL 4 (Four) Times a Day.  Dispense: 473 mL;  Refill: 0    12. Mild episode of recurrent major depressive disorder (HCC)  13. Anxiety  Worsening depression and anxiety with recent health issues.  Followed by psychiatry.  On Pristiq 100 mg daily.  Has upcoming appointment.    14. Attention deficit disorder (ADD) without hyperactivity  On Vyvanse 60 mg daily.    15. Postmenopausal  -     DEXA Bone Density Axial    16. Other specified disorders of bone density and structure, other site  -     DEXA Bone Density Axial    17. Encounter for screening mammogram for malignant neoplasm of breast  -     Mammo Screening Digital Tomosynthesis Bilateral With CAD; Future    18. Screen for colon cancer  -     Ambulatory Referral For Screening Colonoscopy

## 2022-09-09 ENCOUNTER — TELEPHONE (OUTPATIENT)
Dept: FAMILY MEDICINE CLINIC | Facility: CLINIC | Age: 57
End: 2022-09-09

## 2022-09-09 DIAGNOSIS — E53.8 FOLATE DEFICIENCY: Primary | ICD-10-CM

## 2022-09-09 DIAGNOSIS — E53.8 VITAMIN B12 DEFICIENCY: ICD-10-CM

## 2022-09-09 DIAGNOSIS — B37.0 THRUSH, ORAL: ICD-10-CM

## 2022-09-09 LAB — ANA SER QL: NEGATIVE

## 2022-09-09 RX ORDER — FOLIC ACID 1 MG/1
1 TABLET ORAL DAILY
Qty: 90 TABLET | Refills: 0 | Status: SHIPPED | OUTPATIENT
Start: 2022-09-09 | End: 2022-11-01 | Stop reason: SDUPTHER

## 2022-09-09 RX ORDER — MAGNESIUM 200 MG
1 TABLET ORAL DAILY
Qty: 90 EACH | Refills: 1 | Status: SHIPPED | OUTPATIENT
Start: 2022-09-09 | End: 2022-12-22

## 2022-09-09 NOTE — TELEPHONE ENCOUNTER
Caller: Nae Sanchez    Relationship: Self    Best call back number: [phone]    What medication are you requesting: MEDICATION MENTIONED AT APPOINTMENT 9/8/22    What are your current symptoms: BURNING OF FINGERS AND TOES     How long have you been experiencing symptoms: ABOUT A MONTH     Have you had these symptoms before:    [x] Yes  [] No    Have you been treated for these symptoms before:   [] Yes  [x] No    If a prescription is needed, what is your preferred pharmacy and phone number: [facility] #[id number] - [address] [phone] [facility] [phone]      Additional notes: PATIENT STATES DURING HER APPOINTMENT WITH PCP 9/8/22 PCP MENTIONED SHE COULD PRESCRIBE A MEDICATION FOR THE BURNING SHE IS EXPERIENCING IN HER FINGERS AND TOES AND SHE WOULD LIKE TO REQUEST THAT MEDICATION.

## 2022-09-09 NOTE — TELEPHONE ENCOUNTER
We discussed possible options.  I reviewed her labs - her folate and vitamin B12 are borderline.  I have sent in prescriptions for both of these supplements.  We can discuss her hand symptoms further at her next appointment on 10/10 and determine if additional medication is appropriate.

## 2022-09-19 ENCOUNTER — OFFICE VISIT (OUTPATIENT)
Dept: CARDIOLOGY | Facility: HOSPITAL | Age: 57
End: 2022-09-19

## 2022-09-19 ENCOUNTER — LAB (OUTPATIENT)
Dept: LAB | Facility: HOSPITAL | Age: 57
End: 2022-09-19

## 2022-09-19 VITALS
DIASTOLIC BLOOD PRESSURE: 70 MMHG | BODY MASS INDEX: 23.49 KG/M2 | RESPIRATION RATE: 16 BRPM | SYSTOLIC BLOOD PRESSURE: 120 MMHG | TEMPERATURE: 96.2 F | HEART RATE: 70 BPM | OXYGEN SATURATION: 96 % | WEIGHT: 141 LBS | HEIGHT: 65 IN

## 2022-09-19 DIAGNOSIS — I50.22 CHRONIC SYSTOLIC HEART FAILURE: Primary | ICD-10-CM

## 2022-09-19 DIAGNOSIS — I50.22 CHRONIC SYSTOLIC HEART FAILURE: ICD-10-CM

## 2022-09-19 DIAGNOSIS — I42.8 NICM (NONISCHEMIC CARDIOMYOPATHY): ICD-10-CM

## 2022-09-19 LAB
ANION GAP SERPL CALCULATED.3IONS-SCNC: 10 MMOL/L (ref 5–15)
BUN SERPL-MCNC: 23 MG/DL (ref 6–20)
BUN/CREAT SERPL: 18.1 (ref 7–25)
CALCIUM SPEC-SCNC: 9.2 MG/DL (ref 8.6–10.5)
CHLORIDE SERPL-SCNC: 99 MMOL/L (ref 98–107)
CO2 SERPL-SCNC: 32 MMOL/L (ref 22–29)
CREAT SERPL-MCNC: 1.27 MG/DL (ref 0.57–1)
EGFRCR SERPLBLD CKD-EPI 2021: 49.4 ML/MIN/1.73
GLUCOSE SERPL-MCNC: 89 MG/DL (ref 65–99)
POTASSIUM SERPL-SCNC: 4.4 MMOL/L (ref 3.5–5.2)
SODIUM SERPL-SCNC: 141 MMOL/L (ref 136–145)

## 2022-09-19 PROCEDURE — 99214 OFFICE O/P EST MOD 30 MIN: CPT | Performed by: NURSE PRACTITIONER

## 2022-09-19 PROCEDURE — 36415 COLL VENOUS BLD VENIPUNCTURE: CPT

## 2022-09-19 PROCEDURE — 80048 BASIC METABOLIC PNL TOTAL CA: CPT

## 2022-09-19 NOTE — PROGRESS NOTES
Chief Complaint  Establish Care, Fatigue, and Edema    Subjective      History of Present Illness {CC  Problem List  Visit  Diagnosis   Encounters  Notes  Medications  Labs  Result Review Imaging  Media :23}     Nae Sanchez, 57 y.o. female with newly diagnosed HFrEF/NICM, presents to [facility] and Valve clinic for Establish Care, Fatigue, and Edema     Patient recently hospitalized at [facility] presented due to dyspnea and fatigue and found to have new congestive heart failure.  Troponin was elevated at 0.551, proBNP was elevated at 49,000. CTA of the chest was negative for PE but did show small to moderate bilateral pleural effusions with scattered bilateral atelectasis and mediastinal lymphadenopathy, likely reactive. TTE was obtained and showed an ejection fraction of 25%, diastolic dysfunction, and multiple areas of wall motion abnormalities.  Cardiology was consulted and she underwent a left heart cath on 9/1/2022 that showed nonobstructive coronary artery disease, dilated nonischemic cardiomyopathy with severe left ventricular systolic dysfunction, and possible left apical ventricular filling defect suggestive of apical thrombus.  She was started on Xarelto empirically and will continue this on discharge.  She will be discharged on Coreg 6.25 mg twice daily as well as torsemide 10 mg daily.  Had some borderline hypotension on valsartan twice daily, will discharge on once daily valsartan.  She will be scheduled for a follow-up with the heart valve clinic in 1 week and follow-up with  in 4 weeks.  She was also provided a LifeVest prior to discharge.    Patient presents today doing well overall from a cardiovascular standpoint and much improved since hospitalization. Weight is stable at home; edema much improved. She is compliant with cardiac medications and Lifevest as ordered at discharge.      Objective     Vital Signs:   Vitals:    09/19/22 1325 09/19/22 1327  "09/19/22 1328   BP: 120/74 118/76 120/70   BP Location: Right arm Left arm Left arm   Patient Position: Sitting Sitting Standing   Cuff Size: Adult Adult Adult   Pulse: 70 70    Resp: 16     Temp: 96.2 °F (35.7 °C)     TempSrc: Temporal     SpO2: 96% 96%    Weight: 64 kg (141 lb)     Height: 165.1 cm (65\")       Body mass index is 23.46 kg/m².  Physical Exam  Vitals and nursing note reviewed.   Constitutional:       Appearance: Normal appearance.   HENT:      Head: Normocephalic.   Eyes:      Extraocular Movements: Extraocular movements intact.   Neck:      Vascular: No carotid bruit.   Cardiovascular:      Rate and Rhythm: Normal rate and regular rhythm.      Pulses: Normal pulses.      Heart sounds: Normal heart sounds, S1 normal and S2 normal. No murmur heard.     Comments: Lifevest in place  Pulmonary:      Effort: Pulmonary effort is normal. No respiratory distress.      Breath sounds: Normal breath sounds.   Musculoskeletal:      Cervical back: Neck supple.      Right lower leg: Edema present.      Left lower leg: Edema present.      Comments: Trace bilateral LE edema   Skin:     General: Skin is warm and dry.   Neurological:      General: No focal deficit present.      Mental Status: She is alert.   Psychiatric:         Mood and Affect: Mood normal.         Behavior: Behavior normal.         Thought Content: Thought content normal.        Data Reviewed:{ Labs  Result Review  Imaging  Med Tab  Media :23}     Discharge Summary by Joy Su MD (09/03/2022 13:25)  Progress Notes by Byron Aceves MD (09/03/2022 07:55)  Basic Metabolic Panel (09/08/2022 10:47)  Renal Function Panel (09/03/2022 04:17)  Magnesium (09/03/2022 04:17)  CBC & Differential (09/02/2022 03:47)  Lipid Panel (08/31/2022 05:18)  TSH (08/31/2022 05:18)    Adult Transthoracic Echo Complete W/ Cont if Necessary Per Protocol (08/30/2022 12:22)  Cardiac Catheterization/Vascular Study (09/01/2022 14:45)  ECG 12 Lead " (09/01/2022 09:05)      Assessment & Plan   Assessment and Plan {CC Problem List  Visit Diagnosis  ROS  Review (Popup)  Health Maintenance  Quality  BestPractice  Medications  SmartSets  SnapShot Encounters  Media :23}     1. Chronic systolic heart failure (CMS/HCC)  -TTE was obtained and showed an ejection fraction of 25%, diastolic dysfunction, and multiple areas of wall motion abnormalities.    -[facility] 9/1/2022 with nonobstructive coronary artery disease, dilated nonischemic cardiomyopathy with severe left ventricular systolic dysfunction, and possible left apical ventricular filling defect suggestive of apical thrombus.    -Appears near euvolemic and well compensated  -Continue carvedilol, valsartan, torsemide  -Continue Xarelto 20mg daily with dinner  -Continue Lifevest use, compliant with use  -BMP today  -Will message cardiology to arrange for post-discharge follow-up.    2. NICM (nonischemic cardiomyopathy) ([facility])  -TTE was obtained and showed an ejection fraction of 25%, diastolic dysfunction, and multiple areas of wall motion abnormalities.    -[facility] 9/1/2022 with nonobstructive coronary artery disease, dilated nonischemic cardiomyopathy with severe left ventricular systolic dysfunction, and possible left apical ventricular filling defect suggestive of apical thrombus.   -Appears near euvolemic and well compensated  -Continue carvedilol, valsartan, torsemide  -Continue Xarelto 20mg daily with dinner  -BMP today  - Basic Metabolic Panel; Future    3. Non-obstructive CAD  -[facility] with nonobstructive CAD as above  -Continue ASA  -No statin currently; will do enzymes elevated during hospitalization      Follow Up {Instructions Charge Capture  Follow-up Communications :23}     Return in about 2 weeks (around 10/3/2022) for Office follow-up, Recheck.      Patient was given instructions and counseling regarding her condition or for health maintenance advice. Please see specific information pulled into the  AVS if appropriate.  Patient was instructed to call the Heart [facility] Valve Center with any questions, concerns, or worsening symptoms.    Dictated Utilizing Dragon Dictation   Please note that portions of this note were completed with a voice recognition program.   Part of this note may be an electronic transcription/translation of spoken language to printed text using the Dragon Dictation System.

## 2022-09-22 ENCOUNTER — TELEPHONE (OUTPATIENT)
Dept: FAMILY MEDICINE CLINIC | Facility: CLINIC | Age: 57
End: 2022-09-22

## 2022-09-29 NOTE — PROGRESS NOTES
Established Patient Office Visit    Patient Name: Nae Sanchez  : 1965   MRN: [id number]   Care Team: Patient Care Team:  Sandee Keen PA-C as PCP - General (Physician Assistant)  Petey Leigh MD as Cardiologist (Cardiology)    Chief Complaint   Patient presents with   • Chronic systolic heart failure (CMS/HCC)       HPI: Nae Sanchez is a 57 y.o. female with a history of previous tobacco use, COPD, and recent diagnosis of nonischemic, dilated cardiomyopathy who presents today for hospital follow-up.  She was admitted from  to September 3.  She was found to have severe global hypokinesis and depressed ejection fraction.  Coronary angiography showed some LAD disease which was not physiologically restrictive and she was felt to have nonischemic cardiomyopathy.  Additionally, on heart catheterization there was concern for possible left apical thrombus and she was started on rivaroxaban.  She was started on an ARB and carvedilol and fit with LifeVest for discharge.     She has been seen in the heart valve clinic on  with stable renal function and potassium at that time.  Overall, she feels like she is doing much better than before she was admitted to the hospital.  She has some feet swelling however not nearly as bad as had happened before she was admitted.  She continues to have dyspnea on exertion but overall feels much better.  Has been wearing her LifeVest and has not had any events.    I reviewed her event log online for her LifeVest and there has been some PVCs and couplets but no nonsustained or sustained VT.    Subjective   Review of Systems   Constitutional: Negative for activity change.   Respiratory: Positive for shortness of breath.    Cardiovascular: Positive for leg swelling. Negative for chest pain and palpitations.       Social History     Tobacco Use   Smoking Status Former Smoker   • Packs/day: 2.00   • Years: 40.00   • Pack years: 80.00   • Types:  "Cigarettes   • Start date: 1982   • Quit date: 2022   • Years since quittin.0   Smokeless Tobacco Former User   • Quit date: 2022      No Known Allergies      Current Outpatient Medications:   •  aspirin 81 MG EC tablet, Take 1 tablet by mouth Daily., Disp: 30 tablet, Rfl: 6  •  carvedilol (COREG) 6.25 MG tablet, Take 1 tablet by mouth 2 (Two) Times a Day With Meals., Disp: 60 tablet, Rfl: 6  •  Cyanocobalamin (Vitamin B-12) 1000 MCG sublingual tablet, Place 1 tablet under the tongue Daily., Disp: 90 each, Rfl: 1  •  desvenlafaxine (PRISTIQ) 100 MG 24 hr tablet, Take 100 mg by mouth Every Morning., Disp: , Rfl:   •  folic acid (FOLVITE) 1 MG tablet, Take 1 tablet by mouth Daily., Disp: 90 tablet, Rfl: 0  •  lisdexamfetamine (VYVANSE) 60 MG capsule, Take 60 mg by mouth Every Morning, Disp: , Rfl:   •  nicotine (Nicoderm CQ) 21 MG/24HR patch, Place 1 patch on the skin as directed by provider Daily., Disp: 28 each, Rfl: 1  •  nystatin (MYCOSTATIN) 100,000 unit/mL suspension, Swish and swallow 5 mL 4 (Four) Times a Day., Disp: 473 mL, Rfl: 0  •  rivaroxaban (XARELTO) 20 MG tablet, Take 1 tablet by mouth Daily With Dinner. Indications: Other - full anticoagulation, Disp: 30 tablet, Rfl: 6  •  torsemide (DEMADEX) 10 MG tablet, Take 1 tablet by mouth Daily., Disp: 30 tablet, Rfl: 6  •  valsartan (DIOVAN) 40 MG tablet, Take 1 tablet by mouth Daily., Disp: 30 tablet, Rfl: 6      Objective     Vitals:    22 1518   BP: 118/82   BP Location: Left arm   Patient Position: Sitting   Cuff Size: Adult   Pulse: 78   SpO2: 98%   Weight: 62.1 kg (137 lb)   Height: 165.1 cm (65\")     Body mass index is 22.8 kg/m².    Physical Exam  Constitutional:       Appearance: Normal appearance.   HENT:      Head: Normocephalic.   Eyes:      General: No scleral icterus.     Conjunctiva/sclera: Conjunctivae normal.   Neck:      Vascular: No carotid bruit.   Cardiovascular:      Rate and Rhythm: Normal rate and regular rhythm. "      Pulses: Normal pulses.      Heart sounds: Normal heart sounds. No murmur heard.  Pulmonary:      Effort: Pulmonary effort is normal. No respiratory distress.      Breath sounds: Normal breath sounds. No wheezing or rales.   Abdominal:      General: Bowel sounds are normal. There is no distension.      Palpations: Abdomen is soft.      Tenderness: There is no abdominal tenderness.   Musculoskeletal:         General: Normal range of motion.      Cervical back: Normal range of motion and neck supple.      Right foot: Swelling present.      Left foot: Swelling present.   Skin:     General: Skin is warm and dry.      Capillary Refill: Capillary refill takes less than 2 seconds.   Neurological:      General: No focal deficit present.      Mental Status: She is alert and oriented to person, place, and time. Mental status is at baseline.      Gait: Gait normal.   Psychiatric:         Mood and Affect: Mood normal.         Behavior: Behavior normal.         Thought Content: Thought content normal.       RESULTS:   Procedures    [facility] 9/1/2022  FINAL IMPRESSION:  · Angulated/tortuous proximal circumflex with nonocclusive (up to 50%) plaque with normal IFR of 1.0.  · Angiographically near normal left main, the LAD and the dominant RCA.  · Dilated, probably nonischemic cardiomyopathy with severe left ventricular systolic dysfunction, estimated ejection fraction 30%.  · Left ventricular apical filling defect highly suggestive of apical thrombus.    Results for orders placed during the hospital encounter of 08/30/22    Adult Transthoracic Echo Complete W/ Cont if Necessary Per Protocol    Interpretation Summary  · Estimated left ventricular EF = 25% Left ventricular ejection fraction appears to be 21 - 25%. Left ventricular systolic function is severely decreased.  · There is a small left pleural effusion.  · The following left ventricular wall segments are hypokinetic: mid anterior, apical anterior, basal anterolateral, mid  anterolateral, apical lateral, basal inferolateral, mid inferolateral, mid anteroseptal and basal anterior. The following left ventricular wall segments are akinetic: apical inferior, basal inferior, mid inferior, apical septal, basal inferoseptal, mid inferoseptal and apex.  · The left ventricular cavity is mild to moderately dilated.  · The findings are consistent with dilated cardiomyopathy.  · Left ventricular diastolic function is consistent with (grade I) impaired relaxation.  · Left atrial volume is mildly increased.  · Estimated right ventricular systolic pressure from tricuspid regurgitation is normal (<35 mmHg).  · Normal right ventricular cavity size, wall thickness and septal motion noted with moderately reduced right ventricular systolic function.  · No evidence of left ventricular thrombus or mass present.  · There is no evidence of pericardial effusion.  · No evidence of pulmonary hypertension is present.  · Mild to moderate aortic valve regurgitation is present.    Labs:  Lab Results   Component Value Date    WBC 12.36 (H) 09/02/2022    HGB 11.7 (L) 09/02/2022    HCT 36.4 09/02/2022    MCV 84.3 09/02/2022     09/02/2022     Lab Results   Component Value Date    GLUCOSE 89 09/19/2022    BUN 23 (H) 09/19/2022    CREATININE 1.27 (H) 09/19/2022    BCR 18.1 09/19/2022    K 4.4 09/19/2022    CO2 32.0 (H) 09/19/2022    CALCIUM 9.2 09/19/2022    ALBUMIN 3.50 09/03/2022    AST 37 (H) 09/02/2022    ALT 26 09/02/2022     Lab Results   Component Value Date    HGBA1C 5.70 (H) 08/31/2022     Lab Results   Component Value Date    CHOL 112 08/31/2022    TRIG 82 08/31/2022    HDL 30 (L) 08/31/2022    LDL 66 08/31/2022     Most recent PCP note, imaging tests, and labs reviewed.    Assessment & Plan       ICD-10-CM ICD-9-CM   1. NICM (nonischemic cardiomyopathy) (HCC)  I42.8 425.4   2. Chronic systolic heart failure (CMS/HCC)  I50.22 428.22      Chronic heart failure with reduced ejection fraction  Nonischemic  cardiomyopathy   - GDMT with carvedilol, valsartan   - Home blood pressures down into the 90s at times preclude dose escalation or escalation to sacubitril/valsartan   - We will have close follow-up, plan to add SGLT2 inhibitor if able   - Currently wearing a LifeVest, will consider cessation at follow-up due to nonischemic cardiomyopathy if she has less ectopy   -Plan for echocardiogram in 2 months from now   -Refer to heart failure clinic for consideration of advanced therapies    Possible LV thrombus   -Continue rivaroxaban    Return in about 4 weeks (around 10/28/2022).    KAMALJIT Leigh MD, MS  09/30/22    [facility]  [address] [address] [phone]

## 2022-09-30 ENCOUNTER — OFFICE VISIT (OUTPATIENT)
Dept: CARDIOLOGY | Facility: CLINIC | Age: 57
End: 2022-09-30

## 2022-09-30 ENCOUNTER — LAB (OUTPATIENT)
Dept: LAB | Facility: HOSPITAL | Age: 57
End: 2022-09-30

## 2022-09-30 VITALS
BODY MASS INDEX: 22.82 KG/M2 | SYSTOLIC BLOOD PRESSURE: 118 MMHG | HEIGHT: 65 IN | HEART RATE: 78 BPM | WEIGHT: 137 LBS | OXYGEN SATURATION: 98 % | DIASTOLIC BLOOD PRESSURE: 82 MMHG

## 2022-09-30 DIAGNOSIS — I42.8 NICM (NONISCHEMIC CARDIOMYOPATHY): ICD-10-CM

## 2022-09-30 DIAGNOSIS — I50.22 CHRONIC SYSTOLIC HEART FAILURE: ICD-10-CM

## 2022-09-30 DIAGNOSIS — I42.8 NICM (NONISCHEMIC CARDIOMYOPATHY): Primary | ICD-10-CM

## 2022-09-30 LAB
ANION GAP SERPL CALCULATED.3IONS-SCNC: 10 MMOL/L (ref 5–15)
BUN SERPL-MCNC: 25 MG/DL (ref 6–20)
BUN/CREAT SERPL: 16.3 (ref 7–25)
CALCIUM SPEC-SCNC: 9.7 MG/DL (ref 8.6–10.5)
CHLORIDE SERPL-SCNC: 98 MMOL/L (ref 98–107)
CO2 SERPL-SCNC: 33 MMOL/L (ref 22–29)
CREAT SERPL-MCNC: 1.53 MG/DL (ref 0.57–1)
EGFRCR SERPLBLD CKD-EPI 2021: 39.5 ML/MIN/1.73
GLUCOSE SERPL-MCNC: 97 MG/DL (ref 65–99)
MAGNESIUM SERPL-MCNC: 2.2 MG/DL (ref 1.6–2.6)
POTASSIUM SERPL-SCNC: 4.6 MMOL/L (ref 3.5–5.2)
SODIUM SERPL-SCNC: 141 MMOL/L (ref 136–145)

## 2022-09-30 PROCEDURE — 80048 BASIC METABOLIC PNL TOTAL CA: CPT

## 2022-09-30 PROCEDURE — 83735 ASSAY OF MAGNESIUM: CPT

## 2022-09-30 PROCEDURE — 36415 COLL VENOUS BLD VENIPUNCTURE: CPT

## 2022-09-30 PROCEDURE — 99214 OFFICE O/P EST MOD 30 MIN: CPT | Performed by: INTERNAL MEDICINE

## 2022-10-03 ENCOUNTER — OFFICE VISIT (OUTPATIENT)
Dept: FAMILY MEDICINE CLINIC | Facility: CLINIC | Age: 57
End: 2022-10-03

## 2022-10-03 ENCOUNTER — OFFICE VISIT (OUTPATIENT)
Dept: CARDIOLOGY | Facility: HOSPITAL | Age: 57
End: 2022-10-03

## 2022-10-03 VITALS
HEART RATE: 88 BPM | OXYGEN SATURATION: 97 % | SYSTOLIC BLOOD PRESSURE: 126 MMHG | BODY MASS INDEX: 23.19 KG/M2 | TEMPERATURE: 97.9 F | HEIGHT: 65 IN | DIASTOLIC BLOOD PRESSURE: 80 MMHG | WEIGHT: 139.2 LBS

## 2022-10-03 VITALS
WEIGHT: 142.13 LBS | BODY MASS INDEX: 23.68 KG/M2 | HEIGHT: 65 IN | HEART RATE: 73 BPM | TEMPERATURE: 97.2 F | OXYGEN SATURATION: 95 % | DIASTOLIC BLOOD PRESSURE: 73 MMHG | SYSTOLIC BLOOD PRESSURE: 129 MMHG | RESPIRATION RATE: 18 BRPM

## 2022-10-03 DIAGNOSIS — N28.9 RENAL INSUFFICIENCY: ICD-10-CM

## 2022-10-03 DIAGNOSIS — I10 PRIMARY HYPERTENSION: ICD-10-CM

## 2022-10-03 DIAGNOSIS — Z51.81 ENCOUNTER FOR THERAPEUTIC DRUG LEVEL MONITORING: ICD-10-CM

## 2022-10-03 DIAGNOSIS — I50.22 CHRONIC SYSTOLIC HEART FAILURE: Primary | ICD-10-CM

## 2022-10-03 DIAGNOSIS — I42.8 NICM (NONISCHEMIC CARDIOMYOPATHY): ICD-10-CM

## 2022-10-03 DIAGNOSIS — G62.9 NEUROPATHY: ICD-10-CM

## 2022-10-03 DIAGNOSIS — R73.03 PREDIABETES: ICD-10-CM

## 2022-10-03 PROCEDURE — 99214 OFFICE O/P EST MOD 30 MIN: CPT | Performed by: PHYSICIAN ASSISTANT

## 2022-10-03 PROCEDURE — 99214 OFFICE O/P EST MOD 30 MIN: CPT | Performed by: NURSE PRACTITIONER

## 2022-10-03 NOTE — PROGRESS NOTES
Chief Complaint   Patient presents with   • Chronic systolic heart failure     1 Month F/U   • NICM (nonischemic cardiomyopathy)   • Hand Problem     Pt. States Both Hands Burn   • Foot Problem     Pt. States both Feet Burn       HPI     Nae Sanchez is a pleasant 57 y.o. female who is here for routine follow-up of CHF, hypertension, prediabetes, neuropathy and renal insufficiency.  Patient is followed by cardiology was recently seen.  She is compliant on all medications and her blood pressure is stable and well-controlled.  Jardiance was discussed at that appointment but has not been started.  Her recent hemoglobin A1c is 5.7%.  She reports burning and tingling in her hands and feet.  This has been ongoing for several months and is worse in the evenings.  She has never been on gabapentin.  Her father is present at appointment today.    Past Medical History:   Diagnosis Date   • Anxiety    • CHF (congestive heart failure) ([facility]) 2022   • Coronary artery disease 2022   • Depression    • Heart murmur 1965   • Hypertension 2022   • Myocardial infarction ([facility]) 2022   • Sleep apnea  noticed       Past Surgical History:   Procedure Laterality Date   • CARDIAC CATHETERIZATION N/A 2022    Procedure: Left Heart Cath;  Surgeon: Greta Sanchez MD;  Location: [facility] CATH INVASIVE LOCATION;  Service: Cardiology;  Laterality: N/A;   • FOOT SURGERY Left        Family History   Problem Relation Age of Onset   • No Known Problems Mother    • Hypertension Father    • No Known Problems Sister    • Heart disease Brother        Social History     Socioeconomic History   • Marital status:    Tobacco Use   • Smoking status: Former Smoker     Packs/day: 2.00     Years: 40.00     Pack years: 80.00     Types: Cigarettes     Start date: 1982     Quit date: 2022     Years since quittin.0   • Smokeless tobacco: Former User     Quit date: 2022   Vaping Use   • Vaping Use:  "Never used   Substance and Sexual Activity   • Alcohol use: No   • Drug use: No   • Sexual activity: Not Currently     Partners: Male     Birth control/protection: Tubal ligation       No Known Allergies    ROS  Review of Systems   Constitutional: Positive for fatigue. Negative for chills and fever.   Respiratory: Negative for cough, shortness of breath and wheezing.    Cardiovascular: Negative for chest pain, palpitations and leg swelling.   Neurological: Positive for numbness.       Vitals:    10/03/22 1531   BP: 126/80   BP Location: Left arm   Patient Position: Sitting   Cuff Size: Adult   Pulse: 88   Temp: 97.9 °F (36.6 °C)   SpO2: 97%   Weight: 63.1 kg (139 lb 3.2 oz)   Height: 165.1 cm (65\")   PainSc:   8     Body mass index is 23.16 kg/m².    Current Outpatient Medications on File Prior to Visit   Medication Sig Dispense Refill   • aspirin 81 MG EC tablet Take 1 tablet by mouth Daily. 30 tablet 6   • carvedilol (COREG) 6.25 MG tablet Take 1 tablet by mouth 2 (Two) Times a Day With Meals. 60 tablet 6   • Cyanocobalamin (Vitamin B-12) 1000 MCG sublingual tablet Place 1 tablet under the tongue Daily. 90 each 1   • desvenlafaxine (PRISTIQ) 100 MG 24 hr tablet Take 100 mg by mouth Every Morning.     • folic acid (FOLVITE) 1 MG tablet Take 1 tablet by mouth Daily. 90 tablet 0   • lisdexamfetamine (VYVANSE) 60 MG capsule Take 60 mg by mouth Every Morning     • nicotine (Nicoderm CQ) 21 MG/24HR patch Place 1 patch on the skin as directed by provider Daily. 28 each 1   • nystatin (MYCOSTATIN) 100,000 unit/mL suspension Swish and swallow 5 mL 4 (Four) Times a Day. 473 mL 0   • rivaroxaban (XARELTO) 20 MG tablet Take 1 tablet by mouth Daily With Dinner. Indications: Other - full anticoagulation 30 tablet 6   • torsemide (DEMADEX) 10 MG tablet Take 1 tablet by mouth Daily. 30 tablet 6   • valsartan (DIOVAN) 40 MG tablet Take 1 tablet by mouth Daily. 30 tablet 6     No current facility-administered medications on file " prior to visit.       Results for orders placed or performed in visit on 09/30/22   Basic Metabolic Panel    Specimen: Blood   Result Value Ref Range    Glucose 97 65 - 99 mg/dL    BUN 25 (H) 6 - 20 mg/dL    Creatinine 1.53 (H) 0.57 - 1.00 mg/dL    Sodium 141 136 - 145 mmol/L    Potassium 4.6 3.5 - 5.2 mmol/L    Chloride 98 98 - 107 mmol/L    CO2 33.0 (H) 22.0 - 29.0 mmol/L    Calcium 9.7 8.6 - 10.5 mg/dL    BUN/Creatinine Ratio 16.3 7.0 - 25.0    Anion Gap 10.0 5.0 - 15.0 mmol/L    eGFR 39.5 (L) >60.0 mL/min/1.73   Magnesium    Specimen: Blood   Result Value Ref Range    Magnesium 2.2 1.6 - 2.6 mg/dL       PE    Physical Exam  Vitals reviewed.   Constitutional:       General: She is not in acute distress.     Appearance: Normal appearance. She is well-developed and normal weight. She is not ill-appearing or diaphoretic.   HENT:      Head: Normocephalic and atraumatic.   Eyes:      Extraocular Movements: Extraocular movements intact.      Conjunctiva/sclera: Conjunctivae normal.   Cardiovascular:      Rate and Rhythm: Normal rate and regular rhythm.   Pulmonary:      Effort: No respiratory distress.   Musculoskeletal:         General: Normal range of motion.      Cervical back: Normal range of motion.      Right lower leg: No edema.      Left lower leg: No edema.   Skin:     General: Skin is warm.      Findings: No erythema or rash.   Neurological:      General: No focal deficit present.      Mental Status: She is alert.   Psychiatric:         Attention and Perception: She is attentive.         Mood and Affect: Mood normal. Affect is flat.         Speech: Speech normal.         Behavior: Behavior normal. Behavior is cooperative.         Thought Content: Thought content normal.         Judgment: Judgment normal.         A/P    Diagnoses and all orders for this visit:    1. Chronic systolic heart failure (CMS/HCC) (Primary)  -     empagliflozin (JARDIANCE) 10 MG tablet tablet; Take 1 tablet by mouth Daily.  Dispense:  90 tablet; Refill: 1  Followed by cardiology.  Compliant on all medications.  Jardiance was discussed with patient at last appointment.  We will go ahead and start Jardiance 10 mg daily.  GFR is 39, Jardiance may be started for heart failure with GFR>20.  Discussed side effects, risks and benefits of starting Jardiance.    2. NICM (nonischemic cardiomyopathy) ([facility])  3. Primary hypertension  Stable, well-controlled.  Compliant on medication.    4. Prediabetes  -     empagliflozin (JARDIANCE) 10 MG tablet tablet; Take 1 tablet by mouth Daily.  Dispense: 90 tablet; Refill: 1    5. Neuropathy  Patient has burning and tingling in her hands and feet.  Suspect that this is related to neuropathy.  Will consider starting patient on gabapentin 300 mg nightly.  We will need to obtain UDS, CSC and Neil first.  Once UDS has been completed and evaluated, will send in prescription for gabapentin.    6. Renal insufficiency  Due to renal function showing a GFR of 39.    7. Encounter for therapeutic drug level monitoring  -     Compliance Drug Analysis, Ur - Urine, Clean Catch; Future  -     Compliance Drug Analysis, Ur - Urine, Clean Catch         Plan of care reviewed with patient at the conclusion of today's visit. Education was provided regarding diagnosis, management and any prescribed or recommended OTC medications.  Patient verbalizes understanding of and agreement with management plan.    Return in about 6 weeks (around 11/14/2022) for Recheck, neuropathy/jardiance.     Sandee Keen PA-C

## 2022-10-03 NOTE — PROGRESS NOTES
Chief Complaint  Congestive Heart Failure and Follow-up    Subjective      History of Present Illness {CC  Problem List  Visit  Diagnosis   Encounters  Notes  Medications  Labs  Result Review Imaging  Media :23}     Nae Sanchez, 57 y.o. female with newly diagnosed HFrEF/NICM, presents to [facility] for Congestive Heart Failure and Follow-up     Patient recently hospitalized at [facility] presented due to dyspnea and fatigue and found to have new congestive heart failure.  Troponin was elevated at 0.551, proBNP was elevated at 49,000. CTA of the chest was negative for PE but did show small to moderate bilateral pleural effusions with scattered bilateral atelectasis and mediastinal lymphadenopathy, likely reactive. TTE was obtained and showed an ejection fraction of 25%, diastolic dysfunction, and multiple areas of wall motion abnormalities.  Cardiology was consulted and she underwent a left heart cath on 9/1/2022 that showed nonobstructive coronary artery disease, dilated nonischemic cardiomyopathy with severe left ventricular systolic dysfunction, and possible left apical ventricular filling defect suggestive of apical thrombus.  She was started on Xarelto empirically and will continue this on discharge.  She will be discharged on Coreg 6.25 mg twice daily as well as torsemide 10 mg daily.  Had some borderline hypotension on valsartan twice daily, will discharge on once daily valsartan.    Since previous evaluation patient established outpatient with ; patient was continued on current GDMT with consideration for SGLT2 inhibitor if able at follow-up, home SBP down into the 90s at times so escalation of therapy was deferred.    Patient presents today doing well overall from a cardiovascular standpoint and much improved since hospitalization. Weight is stable at home; edema much improved. She is compliant with cardiac medications and Lifevest as ordered  "at discharge.  She denies chest pain, dyspnea, palpitations/tachypalpitation, syncope.      Objective     Vital Signs:   Vitals:    10/03/22 1252   BP: 129/73   BP Location: Left arm   Patient Position: Sitting   Cuff Size: Adult   Pulse: 73   Resp: 18   Temp: 97.2 °F (36.2 °C)   TempSrc: Temporal   SpO2: 95%   Weight: 64.5 kg (142 lb 2 oz)   Height: 165.1 cm (65\")     Body mass index is 23.65 kg/m².  Physical Exam  Vitals and nursing note reviewed.   Constitutional:       Appearance: Normal appearance.   HENT:      Head: Normocephalic.   Eyes:      Extraocular Movements: Extraocular movements intact.   Neck:      Vascular: No carotid bruit.   Cardiovascular:      Rate and Rhythm: Normal rate and regular rhythm.      Pulses: Normal pulses.      Heart sounds: Normal heart sounds, S1 normal and S2 normal. No murmur heard.     Comments: Lifevest in place  Pulmonary:      Effort: Pulmonary effort is normal. No respiratory distress.      Breath sounds: Normal breath sounds.   Musculoskeletal:      Cervical back: Neck supple.      Right lower leg: Edema present.      Left lower leg: Edema present.      Comments: Trace bilateral LE edema   Skin:     General: Skin is warm and dry.   Neurological:      General: No focal deficit present.      Mental Status: She is alert.   Psychiatric:         Mood and Affect: Mood normal.         Behavior: Behavior normal.         Thought Content: Thought content normal.        Data Reviewed:{ Labs  Result Review  Imaging  Med Tab  Media :23}     Discharge Summary by Joy Su MD (09/03/2022 13:25)  Progress Notes by Byron Aceves MD (09/03/2022 07:55)  Basic Metabolic Panel (09/08/2022 10:47)  Renal Function Panel (09/03/2022 04:17)  Magnesium (09/03/2022 04:17)  CBC & Differential (09/02/2022 03:47)  Lipid Panel (08/31/2022 05:18)  TSH (08/31/2022 05:18)    Adult Transthoracic Echo Complete W/ Cont if Necessary Per Protocol (08/30/2022 12:22)  Cardiac " Catheterization/Vascular Study (09/01/2022 14:45)  ECG 12 Lead (09/01/2022 09:05)      Assessment & Plan   Assessment and Plan {CC Problem List  Visit Diagnosis  ROS  Review (Popup)  Health Maintenance  Quality  BestPractice  Medications  SmartSets  SnapShot Encounters  Media :23}     1. Chronic systolic heart failure (CMS/HCC)  -TTE was obtained and showed an ejection fraction of 25%, diastolic dysfunction, and multiple areas of wall motion abnormalities.    -[facility] 9/1/2022 with nonobstructive coronary artery disease, dilated nonischemic cardiomyopathy with severe left ventricular systolic dysfunction, and possible left apical ventricular filling defect suggestive of apical thrombus.    -Appears near euvolemic and well compensated  -Continue carvedilol, valsartan, torsemide  -Continue Xarelto 20mg daily with dinner  -Continue Lifevest use, compliant with use  -Continue follow-up with cardiology as scheduled; discussed keeping BP log at home prior to follow-up appointment    2. NICM (nonischemic cardiomyopathy) ([facility])  -TTE was obtained and showed an ejection fraction of 25%, diastolic dysfunction, and multiple areas of wall motion abnormalities.    -[facility] 9/1/2022 with nonobstructive coronary artery disease, dilated nonischemic cardiomyopathy with severe left ventricular systolic dysfunction, and possible left apical ventricular filling defect suggestive of apical thrombus.   -Appears near euvolemic and well compensated  -Continue carvedilol, valsartan, torsemide  -Continue Xarelto 20mg daily for possible LV thrombus    3. Non-obstructive CAD  -[facility] with nonobstructive CAD as above  -Continue ASA      Follow Up {Instructions Charge Capture  Follow-up Communications :23}     Return if symptoms worsen or fail to improve.      Patient was given instructions and counseling regarding her condition or for health maintenance advice. Please see specific information pulled into the AVS if appropriate.  Patient was  instructed to call the [facility] Center with any questions, concerns, or worsening symptoms.    Dictated Utilizing Dragon Dictation   Please note that portions of this note were completed with a voice recognition program.   Part of this note may be an electronic transcription/translation of spoken language to printed text using the Dragon Dictation System.

## 2022-10-07 LAB — DRUGS UR: NORMAL

## 2022-10-11 ENCOUNTER — LAB (OUTPATIENT)
Dept: LAB | Facility: HOSPITAL | Age: 57
End: 2022-10-11

## 2022-10-11 ENCOUNTER — OFFICE VISIT (OUTPATIENT)
Dept: CARDIOLOGY | Facility: HOSPITAL | Age: 57
End: 2022-10-11

## 2022-10-11 ENCOUNTER — TELEPHONE (OUTPATIENT)
Dept: CARDIOLOGY | Facility: CLINIC | Age: 57
End: 2022-10-11

## 2022-10-11 VITALS
TEMPERATURE: 97.5 F | BODY MASS INDEX: 23.32 KG/M2 | DIASTOLIC BLOOD PRESSURE: 79 MMHG | RESPIRATION RATE: 18 BRPM | SYSTOLIC BLOOD PRESSURE: 135 MMHG | WEIGHT: 140 LBS | HEIGHT: 65 IN | OXYGEN SATURATION: 97 % | HEART RATE: 67 BPM

## 2022-10-11 DIAGNOSIS — I42.8 NICM (NONISCHEMIC CARDIOMYOPATHY): Primary | ICD-10-CM

## 2022-10-11 DIAGNOSIS — I50.22 CHRONIC SYSTOLIC HEART FAILURE: ICD-10-CM

## 2022-10-11 DIAGNOSIS — I42.8 NICM (NONISCHEMIC CARDIOMYOPATHY): ICD-10-CM

## 2022-10-11 LAB
ANION GAP SERPL CALCULATED.3IONS-SCNC: 9 MMOL/L (ref 5–15)
BUN SERPL-MCNC: 29 MG/DL (ref 6–20)
BUN/CREAT SERPL: 19.1 (ref 7–25)
CALCIUM SPEC-SCNC: 9.2 MG/DL (ref 8.6–10.5)
CHLORIDE SERPL-SCNC: 100 MMOL/L (ref 98–107)
CO2 SERPL-SCNC: 32 MMOL/L (ref 22–29)
CREAT SERPL-MCNC: 1.52 MG/DL (ref 0.57–1)
EGFRCR SERPLBLD CKD-EPI 2021: 39.8 ML/MIN/1.73
GLUCOSE SERPL-MCNC: 84 MG/DL (ref 65–99)
POTASSIUM SERPL-SCNC: 4.5 MMOL/L (ref 3.5–5.2)
SODIUM SERPL-SCNC: 141 MMOL/L (ref 136–145)

## 2022-10-11 PROCEDURE — 80048 BASIC METABOLIC PNL TOTAL CA: CPT

## 2022-10-11 PROCEDURE — 36415 COLL VENOUS BLD VENIPUNCTURE: CPT

## 2022-10-11 PROCEDURE — 99214 OFFICE O/P EST MOD 30 MIN: CPT | Performed by: NURSE PRACTITIONER

## 2022-10-11 NOTE — PROGRESS NOTES
Chief Complaint  Congestive Heart Failure and Follow-up    Subjective      History of Present Illness {CC  Problem List  Visit  Diagnosis   Encounters  Notes  Medications  Labs  Result Review Imaging  Media :23}     Nae Sanchez, 57 y.o. female with newly diagnosed HFrEF/NICM, presents to [facility] clinic for Congestive Heart Failure and Follow-up     Patient recently hospitalized at [facility] presented due to dyspnea and fatigue and found to have new congestive heart failure.  Troponin was elevated at 0.551, proBNP was elevated at 49,000. CTA of the chest was negative for PE but did show small to moderate bilateral pleural effusions with scattered bilateral atelectasis and mediastinal lymphadenopathy, likely reactive. TTE was obtained and showed an ejection fraction of 25%, diastolic dysfunction, and multiple areas of wall motion abnormalities.  Cardiology was consulted and she underwent a left heart cath on 9/1/2022 that showed nonobstructive coronary artery disease, dilated nonischemic cardiomyopathy with severe left ventricular systolic dysfunction, and possible left apical ventricular filling defect suggestive of apical thrombus.  She was started on Xarelto empirically and will continue this on discharge.  She will be discharged on Coreg 6.25 mg twice daily as well as torsemide 10 mg daily.  Had some borderline hypotension on valsartan twice daily, will discharge on once daily valsartan.    Patient recently established outpatient with ; patient was continued on current GDMT.  Patient recent visit with PCP and initiated on Jardiance.    Patient presents today doing well overall from a cardiovascular standpoint and much improved since hospitalization. Weight is stable at home; edema much improved. She is compliant with cardiac medications and Lifevest as ordered at discharge.  She denies chest pain, dyspnea, palpitations/tachypalpitation, syncope. SBP  " on home monitoring.       Objective     Vital Signs:   Vitals:    10/11/22 1117   BP: 135/79   BP Location: Left arm   Patient Position: Sitting   Cuff Size: Adult   Pulse: 67   Resp: 18   Temp: 97.5 °F (36.4 °C)   TempSrc: Temporal   SpO2: 97%   Weight: 63.5 kg (140 lb)   Height: 165.1 cm (65\")     Body mass index is 23.3 kg/m².  Physical Exam  Vitals and nursing note reviewed.   Constitutional:       Appearance: Normal appearance.   HENT:      Head: Normocephalic.   Eyes:      Extraocular Movements: Extraocular movements intact.   Neck:      Vascular: No carotid bruit.   Cardiovascular:      Rate and Rhythm: Normal rate and regular rhythm.      Pulses: Normal pulses.      Heart sounds: Normal heart sounds, S1 normal and S2 normal. No murmur heard.     Comments: Lifevest in place  Pulmonary:      Effort: Pulmonary effort is normal. No respiratory distress.      Breath sounds: Normal breath sounds.   Musculoskeletal:      Cervical back: Neck supple.      Right lower leg: Edema present.      Left lower leg: Edema present.      Comments: Trace bilateral LE edema   Skin:     General: Skin is warm and dry.   Neurological:      General: No focal deficit present.      Mental Status: She is alert.   Psychiatric:         Mood and Affect: Mood normal.         Behavior: Behavior normal.         Thought Content: Thought content normal.        Data Reviewed:{ Labs  Result Review  Imaging  Med Tab  Media :23}     Discharge Summary by Joy Su MD (09/03/2022 13:25)  Progress Notes by Byron Aceves MD (09/03/2022 07:55)  Basic Metabolic Panel (09/08/2022 10:47)  Renal Function Panel (09/03/2022 04:17)  Magnesium (09/03/2022 04:17)  CBC & Differential (09/02/2022 03:47)  Lipid Panel (08/31/2022 05:18)  TSH (08/31/2022 05:18)    Adult Transthoracic Echo Complete W/ Cont if Necessary Per Protocol (08/30/2022 12:22)  Cardiac Catheterization/Vascular Study (09/01/2022 14:45)  ECG 12 Lead (09/01/2022 " 09:05)      Assessment & Plan   Assessment and Plan {CC Problem List  Visit Diagnosis  ROS  Review (Popup)  [facility] Maintenance  Quality  BestPractice  Medications  SmartSets  SnapShot Encounters  Media :23}     1. Chronic systolic heart failure (CMS/HCC)  -TTE was obtained and showed an ejection fraction of 25%, diastolic dysfunction, and multiple areas of wall motion abnormalities.    -[facility] 9/1/2022 with nonobstructive coronary artery disease, dilated nonischemic cardiomyopathy with severe left ventricular systolic dysfunction, and possible left apical ventricular filling defect suggestive of apical thrombus.    -Appears near euvolemic and well compensated  -Jardiance recently initiated  -Continue carvedilol, valsartan, Jardiance, torsemide  -Continue Xarelto 20mg daily with dinner  -Continue Lifevest use, compliant with use  -Continue follow-up with cardiology as scheduled; discussed keeping BP log at home prior to follow-up appointment  -Will discuss referral to  advanced heart failure clinic possibility with   -[facility] today    2. NICM (nonischemic cardiomyopathy) (HCC)  -TTE was obtained and showed an ejection fraction of 25%, diastolic dysfunction, and multiple areas of wall motion abnormalities.    -[facility] 9/1/2022 with nonobstructive coronary artery disease, dilated nonischemic cardiomyopathy with severe left ventricular systolic dysfunction, and possible left apical ventricular filling defect suggestive of apical thrombus.   -Appears near euvolemic and well compensated  -Continue carvedilol, valsartan, torsemide  -Continue Xarelto 20mg daily for possible LV thrombus  -Will discuss referral to  advanced heart failure clinic possibility with     3. Non-obstructive CAD  -[facility] with nonobstructive CAD as above  -Continue ASA      Follow Up {Instructions Charge Capture  Follow-up Communications :23}     Return in about 6 weeks (around 11/22/2022) for Office follow-up, Recheck, BP  check.      Patient was given instructions and counseling regarding her condition or for health maintenance advice. Please see specific information pulled into the AVS if appropriate.  Patient was instructed to call the Heart and Valve Center with any questions, concerns, or worsening symptoms.    Dictated Utilizing Dragon Dictation   Please note that portions of this note were completed with a voice recognition program.   Part of this note may be an electronic transcription/translation of spoken language to printed text using the Dragon Dictation System.

## 2022-10-11 NOTE — TELEPHONE ENCOUNTER
This pt sees Cris LUTZ who manages her depression and ADHD- Cris is asking if it is ok that pt continues Vyvanse- pt recently found to have NICM with EF 30%. She is currently wearing a lifevest.   PT would like to continue Vyvanse if at all possible.     She is scheduled for a f/u with you on 11/02/2022. No follow up echo scheduled yet.

## 2022-10-12 NOTE — TELEPHONE ENCOUNTER
Petey Leigh MD  You 1 hour ago (8:18 AM)     JL  That should be okay so long as she is seen regularly to monitor her Bps

## 2022-10-12 NOTE — TELEPHONE ENCOUNTER
Relayed info to Cris- she verbalized understanding. Will call me if she needs anything further or has any further questions/concerns

## 2022-10-17 DIAGNOSIS — G62.9 NEUROPATHY: Primary | ICD-10-CM

## 2022-10-17 RX ORDER — GABAPENTIN 300 MG/1
300 CAPSULE ORAL NIGHTLY
Qty: 30 CAPSULE | Refills: 0 | Status: SHIPPED | OUTPATIENT
Start: 2022-10-17 | End: 2022-11-11 | Stop reason: SDUPTHER

## 2022-10-19 ENCOUNTER — PREP FOR SURGERY (OUTPATIENT)
Dept: OTHER | Facility: HOSPITAL | Age: 57
End: 2022-10-19

## 2022-10-19 ENCOUNTER — TELEPHONE (OUTPATIENT)
Dept: GASTROENTEROLOGY | Facility: CLINIC | Age: 57
End: 2022-10-19

## 2022-10-19 DIAGNOSIS — Z12.11 ENCOUNTER FOR SCREENING COLONOSCOPY: Primary | ICD-10-CM

## 2022-10-19 NOTE — TELEPHONE ENCOUNTER
[address]ae [address] [place] [address]  1965        Patient will be having a a colonoscopy by Dr. Rebel De Paz on November 15, 2022. The patient is needing cardiac clearance due to being on blood thinners. Please complete the following and fax back to the office.     Patient's was last seen in the office on:_____________________    Procedure/Test Performed:    _____ Stress Test       _____ Echocardiogram    _____ EKG     _____ Heart Cath    Based on the above test results and/or clinical evaluation it is my recommendation:    ____ Patient may proceed with the scheduled surgical procedure with an acceptable cardiac risk.    ____ Patient CAN NOT stop Plavix, Effient, Ticlid, Aspirin, Coumadin, Pradaxa, Xarelto, Eliquis, Savaysa, or Brilinta prior to procedure.     ____ Patient CAN stop Plavix, Effient, Ticlid, Aspirin, Coumadin, Pradaxa, Xarelto, Eliquis, Savaysa, or Brilinta  ______ days prior to procedure.    Please sign and date below.    Signature________________________________________   Date:_________________     Thank You    Mark I. Brunner, M.D.  BRAIN Montanez M.D. Mark A. Spurlin, M.D. Gregory M. Woolfolk, M.D.  Any Gutierrez, NELDA Mariscal, SAMUEL Schwarz

## 2022-10-19 NOTE — TELEPHONE ENCOUNTER
Per - if this is a screening colonoscopy, would prefer that this be pushed out, as pt does have LV thrombus and do not want her off anticoagulation right now    I spoke with Alvaro in GI- this is a screening colonoscopy- she will push out procedure until after first of the year- pt is due for f/u echo end of November

## 2022-10-19 NOTE — TELEPHONE ENCOUNTER
Xarelto for LV thrombus     They are requesting to hold for colonoscopy on 11/15     She has an appt 11/2 with us - last note on 9/30 said echo in 2 months but nothing was ordered

## 2022-10-20 PROBLEM — Z12.11 ENCOUNTER FOR SCREENING COLONOSCOPY: Status: ACTIVE | Noted: 2022-10-20

## 2022-10-26 DIAGNOSIS — I42.8 NICM (NONISCHEMIC CARDIOMYOPATHY): Primary | ICD-10-CM

## 2022-10-26 RX ORDER — TORSEMIDE 10 MG/1
5 TABLET ORAL DAILY
Qty: 30 TABLET | Refills: 6 | Status: SHIPPED | OUTPATIENT
Start: 2022-10-26 | End: 2022-12-22

## 2022-10-26 NOTE — PROGRESS NOTES
Renal function slightly worsened on recent BMP. Contacted patient and she notes edema much improved; instructed to decrease torsemide to 5mg daily. Continue follow-up with cardiology as scheduled next week.

## 2022-11-01 ENCOUNTER — APPOINTMENT (OUTPATIENT)
Dept: OTHER | Facility: HOSPITAL | Age: 57
End: 2022-11-01

## 2022-11-01 ENCOUNTER — HOSPITAL ENCOUNTER (OUTPATIENT)
Dept: BONE DENSITY | Facility: HOSPITAL | Age: 57
Discharge: HOME | End: 2022-11-01

## 2022-11-01 ENCOUNTER — HOSPITAL ENCOUNTER (OUTPATIENT)
Dept: MAMMOGRAPHY | Facility: HOSPITAL | Age: 57
Discharge: HOME | End: 2022-11-01

## 2022-11-01 DIAGNOSIS — Z12.31 ENCOUNTER FOR SCREENING MAMMOGRAM FOR MALIGNANT NEOPLASM OF BREAST: ICD-10-CM

## 2022-11-01 DIAGNOSIS — E53.8 FOLATE DEFICIENCY: ICD-10-CM

## 2022-11-01 PROCEDURE — 77080 DXA BONE DENSITY AXIAL: CPT

## 2022-11-01 PROCEDURE — 77067 SCR MAMMO BI INCL CAD: CPT

## 2022-11-01 PROCEDURE — 77063 BREAST TOMOSYNTHESIS BI: CPT | Performed by: RADIOLOGY

## 2022-11-01 PROCEDURE — 77067 SCR MAMMO BI INCL CAD: CPT | Performed by: RADIOLOGY

## 2022-11-01 PROCEDURE — 77063 BREAST TOMOSYNTHESIS BI: CPT

## 2022-11-01 RX ORDER — FOLIC ACID 1 MG/1
1 TABLET ORAL DAILY
Qty: 90 TABLET | Refills: 0 | Status: SHIPPED | OUTPATIENT
Start: 2022-11-01 | End: 2023-01-03

## 2022-11-01 NOTE — TELEPHONE ENCOUNTER
Rx Refill Note  Requested Prescriptions     Pending Prescriptions Disp Refills   • folic acid (FOLVITE) 1 MG tablet 90 tablet 0     Sig: Take 1 tablet by mouth Daily.      Last office visit with prescribing clinician: 10/3/2022      Next office visit with prescribing clinician: 11/11/2022            Angelika Pal MA  11/01/22, 07:56 EDT

## 2022-11-02 ENCOUNTER — OFFICE VISIT (OUTPATIENT)
Dept: CARDIOLOGY | Facility: CLINIC | Age: 57
End: 2022-11-02

## 2022-11-02 VITALS
BODY MASS INDEX: 24.16 KG/M2 | WEIGHT: 145 LBS | DIASTOLIC BLOOD PRESSURE: 70 MMHG | HEART RATE: 75 BPM | OXYGEN SATURATION: 96 % | HEIGHT: 65 IN | SYSTOLIC BLOOD PRESSURE: 130 MMHG

## 2022-11-02 DIAGNOSIS — I42.8 NICM (NONISCHEMIC CARDIOMYOPATHY): Primary | ICD-10-CM

## 2022-11-02 DIAGNOSIS — I50.22 CHRONIC SYSTOLIC HEART FAILURE: ICD-10-CM

## 2022-11-02 DIAGNOSIS — I10 PRIMARY HYPERTENSION: ICD-10-CM

## 2022-11-02 PROCEDURE — 99214 OFFICE O/P EST MOD 30 MIN: CPT | Performed by: INTERNAL MEDICINE

## 2022-11-02 NOTE — PROGRESS NOTES
Established Patient Office Visit    Patient Name: Nae Sanchez  : 1965   MRN: [id number]   Care Team: Patient Care Team:  Sandee Keen PA-C as PCP - General (Physician Assistant)  Petey Leigh MD as Cardiologist (Cardiology)    Chief Complaint   Patient presents with   • Cardiomyopathy       HPI: Nae Sanchez is a 57 y.o. female with a history of previous tobacco use, COPD, and nonischemic, dilated cardiomyopathy who presents today for hospital follow-up. She was found to have severe global hypokinesis and depressed ejection fraction.  Coronary angiography showed some LAD disease which was not physiologically restrictive and she was felt to have nonischemic cardiomyopathy.  Additionally, on heart catheterization there was concern for possible left apical thrombus and she was started on rivaroxaban.      I last saw her in clinic on  however she is also had close follow-up with the heart valve clinic as well.  Due to some increase in her creatinine she has been taking only 5 mg of torsemide and she reports that her leg swelling has come back a little bit but nowhere near as bad as it was previously.  She denies any chest pain, shortness of breath however has not returned to work.    Subjective   Review of Systems   Constitutional: Negative for activity change.   Respiratory: Positive for shortness of breath.    Cardiovascular: Positive for leg swelling. Negative for chest pain and palpitations.       Social History     Tobacco Use   Smoking Status Former   • Packs/day: 2.00   • Years: 40.00   • Pack years: 80.00   • Types: Cigarettes   • Start date: 1982   • Quit date: 2022   • Years since quittin.1   Smokeless Tobacco Former   • Quit date: 2022      No Known Allergies      Current Outpatient Medications:   •  aspirin 81 MG EC tablet, Take 1 tablet by mouth Daily., Disp: 30 tablet, Rfl: 6  •  carvedilol (COREG) 6.25 MG tablet, Take 1 tablet by mouth 2  "(Two) Times a Day With Meals., Disp: 60 tablet, Rfl: 6  •  Cyanocobalamin (Vitamin B-12) 1000 MCG sublingual tablet, Place 1 tablet under the tongue Daily., Disp: 90 each, Rfl: 1  •  desvenlafaxine (PRISTIQ) 100 MG 24 hr tablet, Take 100 mg by mouth Every Morning., Disp: , Rfl:   •  empagliflozin (JARDIANCE) 10 MG tablet tablet, Take 1 tablet by mouth Daily., Disp: 90 tablet, Rfl: 1  •  folic acid (FOLVITE) 1 MG tablet, Take 1 tablet by mouth Daily., Disp: 90 tablet, Rfl: 0  •  gabapentin (NEURONTIN) 300 MG capsule, Take 1 capsule by mouth Every Night., Disp: 30 capsule, Rfl: 0  •  lisdexamfetamine (VYVANSE) 60 MG capsule, Take 60 mg by mouth Every Morning, Disp: , Rfl:   •  nicotine (Nicoderm CQ) 21 MG/24HR patch, Place 1 patch on the skin as directed by provider Daily., Disp: 28 each, Rfl: 1  •  nystatin (MYCOSTATIN) 100,000 unit/mL suspension, Swish and swallow 5 mL 4 (Four) Times a Day., Disp: 473 mL, Rfl: 0  •  rivaroxaban (XARELTO) 20 MG tablet, Take 1 tablet by mouth Daily With Dinner. Indications: Other - full anticoagulation, Disp: 30 tablet, Rfl: 6  •  torsemide (DEMADEX) 10 MG tablet, Take 0.5 tablets by mouth Daily., Disp: 30 tablet, Rfl: 6  •  valsartan (DIOVAN) 40 MG tablet, Take 1 tablet by mouth Daily., Disp: 30 tablet, Rfl: 6      Objective     Vitals:    11/02/22 1600   BP: 130/70   BP Location: Left arm   Patient Position: Sitting   Pulse: 75   SpO2: 96%   Weight: 65.8 kg (145 lb)   Height: 165.1 cm (65\")     Body mass index is 24.13 kg/m².  Gen: well developed, LH white female, sitting up on exam table  HEENT: MMM, sclera anicteric, conjunctiva normal  CV: regular rate, regular rhythm, no murmurs or rubs, normal S1, S2. 2+ radial pulses.  LifeVest in place  Pulm: RA, normal work of breathing, no wheezes, rales, rhonchi  Ext: normal bulk for age, normal tone, trace bilateral dependent edema  Neuro: alert, oriented, face symmetrical, moving all extremities well  Psych: normal mood, appropriate " affect    RESULTS:     [facility] 9/1/2022  FINAL IMPRESSION:  · Angulated/tortuous proximal circumflex with nonocclusive (up to 50%) plaque with normal IFR of 1.0.  · Angiographically near normal left main, the LAD and the dominant RCA.  · Dilated, probably nonischemic cardiomyopathy with severe left ventricular systolic dysfunction, estimated ejection fraction 30%.  · Left ventricular apical filling defect highly suggestive of apical thrombus.    Results for orders placed during the hospital encounter of 08/30/22    Adult Transthoracic Echo Complete W/ Cont if Necessary Per Protocol    Interpretation Summary  · Estimated left ventricular EF = 25% Left ventricular ejection fraction appears to be 21 - 25%. Left ventricular systolic function is severely decreased.  · There is a small left pleural effusion.  · The following left ventricular wall segments are hypokinetic: mid anterior, apical anterior, basal anterolateral, mid anterolateral, apical lateral, basal inferolateral, mid inferolateral, mid anteroseptal and basal anterior. The following left ventricular wall segments are akinetic: apical inferior, basal inferior, mid inferior, apical septal, basal inferoseptal, mid inferoseptal and apex.  · The left ventricular cavity is mild to moderately dilated.  · The findings are consistent with dilated cardiomyopathy.  · Left ventricular diastolic function is consistent with (grade I) impaired relaxation.  · Left atrial volume is mildly increased.  · Estimated right ventricular systolic pressure from tricuspid regurgitation is normal (<35 mmHg).  · Normal right ventricular cavity size, wall thickness and septal motion noted with moderately reduced right ventricular systolic function.  · No evidence of left ventricular thrombus or mass present.  · There is no evidence of pericardial effusion.  · No evidence of pulmonary hypertension is present.  · Mild to moderate aortic valve regurgitation is present.    Labs:  Lab Results    Component Value Date    WBC 12.36 (H) 09/02/2022    HGB 11.7 (L) 09/02/2022    HCT 36.4 09/02/2022    MCV 84.3 09/02/2022     09/02/2022     Lab Results   Component Value Date    GLUCOSE 84 10/11/2022    BUN 29 (H) 10/11/2022    CREATININE 1.52 (H) 10/11/2022    BCR 19.1 10/11/2022    K 4.5 10/11/2022    CO2 32.0 (H) 10/11/2022    CALCIUM 9.2 10/11/2022    ALBUMIN 3.50 09/03/2022    AST 37 (H) 09/02/2022    ALT 26 09/02/2022     Lab Results   Component Value Date    HGBA1C 5.70 (H) 08/31/2022     Lab Results   Component Value Date    CHOL 112 08/31/2022    TRIG 82 08/31/2022    HDL 30 (L) 08/31/2022    LDL 66 08/31/2022     I reviewed the patient's follow-up report for her LifeVest which showed some PVCs but no sustained arrhythmias and no therapy.  She had intermittent adherence in October    Most recent PCP note, imaging tests, and labs reviewed.    Assessment & Plan       ICD-10-CM ICD-9-CM   1. NICM (nonischemic cardiomyopathy) ([facility])  I42.8 425.4   2. Chronic systolic heart failure (CMS/[facility])  I50.22 428.22   3. Primary hypertension  I10 401.9      Chronic heart failure with reduced ejection fraction  Nonischemic cardiomyopathy   - GDMT with carvedilol, valsartan   -Ms. Rodrigues does continue to take her blood pressure at home at times and last week had pressures as low as 80s/50s   - Due to this hypotension we will continue GDMT at the current dose   -Plan for echocardiogram in 2 weeks, pending results will consider cessation of LifeVest    Possible LV thrombus   -Continue rivaroxaban, follow-up TTE with contrast if needed    Return in about 3 months (around 2/2/2023).    KAMALJIT Leigh MD, MS  11/02/22    [facility]  [address] [place] [id number] [phone]

## 2022-11-11 ENCOUNTER — OFFICE VISIT (OUTPATIENT)
Dept: FAMILY MEDICINE CLINIC | Facility: CLINIC | Age: 57
End: 2022-11-11

## 2022-11-11 VITALS
TEMPERATURE: 97.8 F | OXYGEN SATURATION: 98 % | WEIGHT: 151.8 LBS | BODY MASS INDEX: 25.29 KG/M2 | HEART RATE: 88 BPM | HEIGHT: 65 IN | SYSTOLIC BLOOD PRESSURE: 132 MMHG | DIASTOLIC BLOOD PRESSURE: 86 MMHG

## 2022-11-11 DIAGNOSIS — I50.22 CHRONIC SYSTOLIC HEART FAILURE: ICD-10-CM

## 2022-11-11 DIAGNOSIS — I10 PRIMARY HYPERTENSION: ICD-10-CM

## 2022-11-11 DIAGNOSIS — M25.511 ACUTE PAIN OF RIGHT SHOULDER: ICD-10-CM

## 2022-11-11 DIAGNOSIS — R79.89 ELEVATED LFTS: ICD-10-CM

## 2022-11-11 DIAGNOSIS — G62.9 NEUROPATHY: Primary | ICD-10-CM

## 2022-11-11 DIAGNOSIS — N18.32 STAGE 3B CHRONIC KIDNEY DISEASE: ICD-10-CM

## 2022-11-11 DIAGNOSIS — R73.03 PREDIABETES: ICD-10-CM

## 2022-11-11 PROCEDURE — 99214 OFFICE O/P EST MOD 30 MIN: CPT | Performed by: PHYSICIAN ASSISTANT

## 2022-11-11 RX ORDER — GABAPENTIN 300 MG/1
300 CAPSULE ORAL NIGHTLY
Qty: 30 CAPSULE | Refills: 2 | Status: SHIPPED | OUTPATIENT
Start: 2022-11-11 | End: 2023-02-15 | Stop reason: SDUPTHER

## 2022-11-11 NOTE — PATIENT INSTRUCTIONS
Vitamin D3 1,000-2,000 units daily  Calcium 600-1200 mg daily  Walking or exercising for at least 30 minutes a day

## 2022-11-11 NOTE — PROGRESS NOTES
Chief Complaint   Patient presents with   • Shoulder Pain     Right         Nae Sanchez is a 57 y.o. female who presents for Shoulder Pain (Right)    New onset right shoulder pain for the last 4 days.  Using ice.  No trauma/injury.  Not taking anything for pain.  Unable to take NSAIDs due to kidney disease.  Has abnormal liver enzymes, so she avoids tylenol.    Started gabapentin 300 mg nightly.  She reports this is helping with her neuropathy.  Denies any issues with tolerating medication.  No sedation.    Taking Jardiance 10 mg daily.  Tolerating this well.  Denies any urinary symptoms or rash.    Family is present at appointment today.    Past Medical History:   Diagnosis Date   • ADHD (attention deficit hyperactivity disorder) 2019   • Anxiety    • CHF (congestive heart failure) ([facility]) 2022   • Coronary artery disease 2022   • Depression    • Heart murmur 1965   • Hypertension 2022   • Myocardial infarction ([facility]) 2022   • Peptic ulceration    • Sleep apnea  noticed       Past Surgical History:   Procedure Laterality Date   • CARDIAC CATHETERIZATION N/A 2022    Procedure: Left Heart Cath;  Surgeon: Greta Sanchez MD;  Location: [facility] CATH INVASIVE LOCATION;  Service: Cardiology;  Laterality: N/A;   • COLONOSCOPY     • FOOT SURGERY Left    • FRACTURE SURGERY  21    Left foot   • TUBAL ABDOMINAL LIGATION  97       Family History   Problem Relation Age of Onset   • Anxiety disorder Mother    • Hypertension Father    • Diabetes Father    • No Known Problems Sister    • Heart disease Brother    • Early death Brother    • Mental illness Daughter    • Breast cancer Neg Hx        Social History     Socioeconomic History   • Marital status:    Tobacco Use   • Smoking status: Former     Packs/day: 2.00     Years: 40.00     Pack years: 80.00     Types: Cigarettes     Start date: 1982     Quit date: 2022     Years since quittin.2   •  "Smokeless tobacco: Former     Quit date: 8/30/2022   Vaping Use   • Vaping Use: Never used   Substance and Sexual Activity   • Alcohol use: No   • Drug use: No   • Sexual activity: Not Currently     Partners: Male     Birth control/protection: Tubal ligation       No Known Allergies    ROS    Review of Systems   Constitutional: Negative for chills and fever.   Respiratory: Negative for cough.    Cardiovascular: Negative for chest pain.   Genitourinary: Negative for dysuria.   Musculoskeletal: Positive for arthralgias.   Skin: Negative for rash.   Neurological: Positive for numbness. Negative for dizziness, weakness and headache.       Vitals:    11/11/22 1522   BP: 132/86   BP Location: Left arm   Patient Position: Sitting   Cuff Size: Adult   Pulse: 88   Temp: 97.8 °F (36.6 °C)   TempSrc: Temporal   SpO2: 98%   Weight: 68.9 kg (151 lb 12.8 oz)   Height: 165.1 cm (65\")   PainSc:   4   PainLoc: Shoulder     Body mass index is 25.26 kg/m².    Current Outpatient Medications on File Prior to Visit   Medication Sig Dispense Refill   • aspirin 81 MG EC tablet Take 1 tablet by mouth Daily. 30 tablet 6   • carvedilol (COREG) 6.25 MG tablet Take 1 tablet by mouth 2 (Two) Times a Day With Meals. 60 tablet 6   • Cyanocobalamin (Vitamin B-12) 1000 MCG sublingual tablet Place 1 tablet under the tongue Daily. 90 each 1   • desvenlafaxine (PRISTIQ) 100 MG 24 hr tablet Take 100 mg by mouth Every Morning.     • empagliflozin (JARDIANCE) 10 MG tablet tablet Take 1 tablet by mouth Daily. 90 tablet 1   • folic acid (FOLVITE) 1 MG tablet Take 1 tablet by mouth Daily. 90 tablet 0   • lisdexamfetamine (VYVANSE) 60 MG capsule Take 60 mg by mouth Every Morning     • nicotine (Nicoderm CQ) 21 MG/24HR patch Place 1 patch on the skin as directed by provider Daily. 28 each 1   • nystatin (MYCOSTATIN) 100,000 unit/mL suspension Swish and swallow 5 mL 4 (Four) Times a Day. 473 mL 0   • rivaroxaban (XARELTO) 20 MG tablet Take 1 tablet by mouth " Daily With Dinner. Indications: Other - full anticoagulation 30 tablet 6   • torsemide (DEMADEX) 10 MG tablet Take 0.5 tablets by mouth Daily. 30 tablet 6   • valsartan (DIOVAN) 40 MG tablet Take 1 tablet by mouth Daily. 30 tablet 6   • [DISCONTINUED] gabapentin (NEURONTIN) 300 MG capsule Take 1 capsule by mouth Every Night. 30 capsule 0     No current facility-administered medications on file prior to visit.       Results for orders placed or performed in visit on 10/11/22   Basic Metabolic Panel    Specimen: Blood   Result Value Ref Range    Glucose 84 65 - 99 mg/dL    BUN 29 (H) 6 - 20 mg/dL    Creatinine 1.52 (H) 0.57 - 1.00 mg/dL    Sodium 141 136 - 145 mmol/L    Potassium 4.5 3.5 - 5.2 mmol/L    Chloride 100 98 - 107 mmol/L    CO2 32.0 (H) 22.0 - 29.0 mmol/L    Calcium 9.2 8.6 - 10.5 mg/dL    BUN/Creatinine Ratio 19.1 7.0 - 25.0    Anion Gap 9.0 5.0 - 15.0 mmol/L    eGFR 39.8 (L) >60.0 mL/min/1.73       PE    Physical Exam  Vitals reviewed.   Constitutional:       General: She is not in acute distress.     Appearance: Normal appearance. She is well-developed and normal weight. She is not ill-appearing or diaphoretic.   HENT:      Head: Normocephalic and atraumatic.   Eyes:      Extraocular Movements: Extraocular movements intact.      Conjunctiva/sclera: Conjunctivae normal.   Pulmonary:      Effort: No respiratory distress.   Musculoskeletal:         General: Normal range of motion.      Right shoulder: Tenderness present. No swelling, deformity or laceration. Normal range of motion.      Cervical back: Normal range of motion.   Neurological:      General: No focal deficit present.      Mental Status: She is alert.   Psychiatric:         Attention and Perception: She is attentive.         Mood and Affect: Mood normal.         Speech: Speech normal.         Behavior: Behavior normal. Behavior is cooperative.         Thought Content: Thought content normal.         Judgment: Judgment normal.           A/P    Diagnoses and all orders for this visit:    1. Neuropathy (Primary)  -     gabapentin (NEURONTIN) 300 MG capsule; Take 1 capsule by mouth Every Night.  Dispense: 30 capsule; Refill: 2  Doing well with gabapentin 300 mg nightly.  Reports it is helping with pain.  Denies any adverse effects.  UDS, CSC and Neil up-to-date.  Return in 3 months.    2. Acute pain of right shoulder  Started 4 days ago.  No trauma/injury.  Treat symptomatically.  Continue with ice/heat.  Okay to take tylenol sparingly.  If pain persists, she will call and we will start referral to physical therapy.  Advised to stretch and move shoulder to avoid frozen shoulder.    3. Chronic systolic heart failure (CMS/HCC)  Followed by cardiology.  Bumex was recently reduced due to kidney function.    4. Primary hypertension  Stable, well-controlled.  Compliant on medication.    5. Prediabetes  Recent hemoglobin AIC was 5.7%.  Return in 3 months.  Continue on Jardiance 10 mg daily.    6. Elevated LFTs  -     Comprehensive Metabolic Panel; Future    7. Stage 3b chronic kidney disease (HCC)  -     Comprehensive Metabolic Panel; Future  Recheck CMP in 1 month.  Avoiding NSAIDs.  Bumex reduced, Jardiance started.  If kidney function remains declined, will refer to nephrology.       Plan of care reviewed with patient at the conclusion of today's visit. Education was provided regarding diagnosis, management and any prescribed or recommended OTC medications.  Patient verbalizes understanding of and agreement with management plan.    Dictated Utilizing Dragon Dictation     Please note that portions of this note were completed with a voice recognition program.     Part of this note may be an electronic transcription/translation of spoken language to printed text using the Dragon Dictation System.    Return in about 3 months (around 2/11/2023) for Recheck, DM/CM.     Sandee Keen PA-C    Answers for HPI/ROS submitted by the patient on  11/11/2022  Please describe your symptoms.: Follow up CHF  Have you had these symptoms before?: Yes  How long have you been having these symptoms?: Greater than 2 weeks  Please list any medications you are currently taking for this condition.: Already listed  Please describe any probable cause for these symptoms. : Chf  What is the primary reason for your visit?: Other

## 2022-11-22 ENCOUNTER — LAB (OUTPATIENT)
Dept: LAB | Facility: HOSPITAL | Age: 57
End: 2022-11-22

## 2022-11-22 ENCOUNTER — OFFICE VISIT (OUTPATIENT)
Dept: CARDIOLOGY | Facility: HOSPITAL | Age: 57
End: 2022-11-22

## 2022-11-22 VITALS
RESPIRATION RATE: 18 BRPM | BODY MASS INDEX: 31.4 KG/M2 | TEMPERATURE: 98 F | OXYGEN SATURATION: 97 % | HEART RATE: 72 BPM | HEIGHT: 65 IN | WEIGHT: 188.5 LBS | DIASTOLIC BLOOD PRESSURE: 60 MMHG | SYSTOLIC BLOOD PRESSURE: 161 MMHG

## 2022-11-22 DIAGNOSIS — R79.89 ELEVATED LFTS: ICD-10-CM

## 2022-11-22 DIAGNOSIS — N18.32 STAGE 3B CHRONIC KIDNEY DISEASE: ICD-10-CM

## 2022-11-22 DIAGNOSIS — I50.22 CHRONIC SYSTOLIC HEART FAILURE: Primary | ICD-10-CM

## 2022-11-22 LAB
ALBUMIN SERPL-MCNC: 4.7 G/DL (ref 3.5–5.2)
ALBUMIN/GLOB SERPL: 2 G/DL
ALP SERPL-CCNC: 124 U/L (ref 39–117)
ALT SERPL W P-5'-P-CCNC: 28 U/L (ref 1–33)
ANION GAP SERPL CALCULATED.3IONS-SCNC: 9 MMOL/L (ref 5–15)
AST SERPL-CCNC: 36 U/L (ref 1–32)
BILIRUB SERPL-MCNC: 0.4 MG/DL (ref 0–1.2)
BUN SERPL-MCNC: 20 MG/DL (ref 6–20)
BUN/CREAT SERPL: 13.2 (ref 7–25)
CALCIUM SPEC-SCNC: 9.6 MG/DL (ref 8.6–10.5)
CHLORIDE SERPL-SCNC: 98 MMOL/L (ref 98–107)
CO2 SERPL-SCNC: 33 MMOL/L (ref 22–29)
CREAT SERPL-MCNC: 1.52 MG/DL (ref 0.57–1)
EGFRCR SERPLBLD CKD-EPI 2021: 39.8 ML/MIN/1.73
GLOBULIN UR ELPH-MCNC: 2.4 GM/DL
GLUCOSE SERPL-MCNC: 76 MG/DL (ref 65–99)
POTASSIUM SERPL-SCNC: 5 MMOL/L (ref 3.5–5.2)
PROT SERPL-MCNC: 7.1 G/DL (ref 6–8.5)
SODIUM SERPL-SCNC: 140 MMOL/L (ref 136–145)

## 2022-11-22 PROCEDURE — 80053 COMPREHEN METABOLIC PANEL: CPT

## 2022-11-22 PROCEDURE — 99214 OFFICE O/P EST MOD 30 MIN: CPT | Performed by: NURSE PRACTITIONER

## 2022-11-22 RX ORDER — CALCIUM CARBONATE/VITAMIN D3 500MG-5MCG
1 TABLET ORAL DAILY
COMMUNITY
End: 2023-02-08

## 2022-11-22 RX ORDER — CARVEDILOL 12.5 MG/1
12.5 TABLET ORAL 2 TIMES DAILY WITH MEALS
Qty: 60 TABLET | Refills: 2 | Status: SHIPPED | OUTPATIENT
Start: 2022-11-22 | End: 2023-02-08

## 2022-11-22 NOTE — PROGRESS NOTES
Chief Complaint  Hypertension and Follow-up    Subjective      History of Present Illness {CC  Problem List  Visit  Diagnosis   Encounters  Notes  Medications  Labs  Result Review Imaging  Media :23}     Nae Sanchez, 57 y.o. female with newly diagnosed HFrEF/NICM, presents to [facility] clinic for Hypertension and Follow-up     Patient recently hospitalized at [facility] presented due to dyspnea and fatigue and found to have new congestive heart failure.  Troponin was elevated at 0.551, proBNP was elevated at 49,000. CTA of the chest was negative for PE but did show small to moderate bilateral pleural effusions with scattered bilateral atelectasis and mediastinal lymphadenopathy, likely reactive. TTE was obtained and showed an ejection fraction of 25%, diastolic dysfunction, and multiple areas of wall motion abnormalities.  Cardiology was consulted and she underwent a left heart cath on 9/1/2022 that showed nonobstructive coronary artery disease, dilated nonischemic cardiomyopathy with severe left ventricular systolic dysfunction, and possible left apical ventricular filling defect suggestive of apical thrombus.  She was started on Xarelto empirically and will continue this on discharge.  She will be discharged on Coreg 6.25 mg twice daily as well as torsemide 10 mg daily.  Had some borderline hypotension on valsartan twice daily, will discharge on once daily valsartan.    Patient recently established outpatient with ; patient was continued on current GDMT.  Since previous evaluation patient completed follow-up visit with   , echocardiogram ordered and scheduled in approximately 2 weeks.    Patient presents today doing well overall from a cardiovascular standpoint and much improved since hospitalization. Weight is stable at home; edema much improved. She is compliant with cardiac medications and Lifevest as ordered at discharge.  She denies chest  "pain, dyspnea, palpitations/tachypalpitation, syncope. -130s on home monitoring; no hypotension in past 2 weeks since cardiology evaluation.       Objective     Vital Signs:   Vitals:    11/22/22 1019   BP: 161/60   BP Location: Left arm   Patient Position: Sitting   Cuff Size: Adult   Pulse: 72   Resp: 18   Temp: 98 °F (36.7 °C)   TempSrc: Temporal   SpO2: 97%   Weight: 85.5 kg (188 lb 8 oz)   Height: 165.1 cm (65\")     Body mass index is 31.37 kg/m².  Physical Exam  Vitals and nursing note reviewed.   Constitutional:       Appearance: Normal appearance.   HENT:      Head: Normocephalic.   Eyes:      Extraocular Movements: Extraocular movements intact.   Neck:      Vascular: No carotid bruit.   Cardiovascular:      Rate and Rhythm: Normal rate and regular rhythm.      Pulses: Normal pulses.      Heart sounds: Normal heart sounds, S1 normal and S2 normal. No murmur heard.     Comments: Lifevest in place  Pulmonary:      Effort: Pulmonary effort is normal. No respiratory distress.      Breath sounds: Normal breath sounds.   Musculoskeletal:      Cervical back: Neck supple.      Right lower leg: No edema.      Left lower leg: No edema.   Skin:     General: Skin is warm and dry.   Neurological:      General: No focal deficit present.      Mental Status: She is alert.   Psychiatric:         Mood and Affect: Mood normal.         Behavior: Behavior normal.         Thought Content: Thought content normal.        Data Reviewed:{ Labs  Result Review  Imaging  Med Tab  Media :23}     Discharge Summary by Joy Su MD (09/03/2022 13:25)  Progress Notes by Byron Aceves MD (09/03/2022 07:55)  Basic Metabolic Panel (09/08/2022 10:47)  Renal Function Panel (09/03/2022 04:17)  Magnesium (09/03/2022 04:17)  CBC & Differential (09/02/2022 03:47)  Lipid Panel (08/31/2022 05:18)  TSH (08/31/2022 05:18)    Adult Transthoracic Echo Complete W/ Cont if Necessary Per Protocol (08/30/2022 12:22)  Cardiac " Catheterization/Vascular Study (09/01/2022 14:45)  ECG 12 Lead (09/01/2022 09:05)      Assessment & Plan   Assessment and Plan {CC Problem List  Visit Diagnosis  ROS  Review (Popup)  [employer]  Quality  BestPractice  Medications  SmartSets  SnapShot Encounters  Media :23}     1. Chronic systolic heart failure (CMS/HCC)  -TTE was obtained and showed an ejection fraction of 25%, diastolic dysfunction, and multiple areas of wall motion abnormalities.    -[facility] 9/1/2022 with nonobstructive coronary artery disease, dilated nonischemic cardiomyopathy with severe left ventricular systolic dysfunction, and possible left apical ventricular filling defect suggestive of apical thrombus.    -Appears euvolemic and well compensated  -Increase carvedilol to 12.5mg BID; no hypotension lately  -Continue valsartan, Jardiance, torsemide  -Continue Xarelto 20mg daily with dinner  -Continue Lifevest use, compliant with use  -Continue with ordered BMP  -Repeat TTE scheduled in approximately 2 weeks for reevaluationof LVEF  -f/u in 1 month, potential transition of diuretic to PRN    2. NICM (nonischemic cardiomyopathy) ([facility])  -TTE was obtained and showed an ejection fraction of 25%, diastolic dysfunction, and multiple areas of wall motion abnormalities.    -[facility] 9/1/2022 with nonobstructive coronary artery disease, dilated nonischemic cardiomyopathy with severe left ventricular systolic dysfunction, and possible left apical ventricular filling defect suggestive of apical thrombus.   -Appears near euvolemic and well compensated  -Continue carvedilol, valsartan, torsemide  -Continue Xarelto 20mg daily for possible LV thrombus  -Continue with follow-up echocardiogram as scheduled/primary cardiology follow-up as scheduled    3. Non-obstructive CAD  -[facility] with nonobstructive CAD as above  -Continue ASA      Follow Up {Instructions Charge Capture  Follow-up Communications :23}     Return in about 1 month (around 12/22/2022)  for Office follow-up, BP check, Recheck.      Patient was given instructions and counseling regarding her condition or for health maintenance advice. Please see specific information pulled into the AVS if appropriate.  Patient was instructed to call the [facility] with any questions, concerns, or worsening symptoms.    Dictated Utilizing Dragon Dictation   Please note that portions of this note were completed with a voice recognition program.   Part of this note may be an electronic transcription/translation of spoken language to printed text using the Dragon Dictation System.

## 2022-11-29 DIAGNOSIS — N18.32 STAGE 3B CHRONIC KIDNEY DISEASE: Primary | ICD-10-CM

## 2022-12-06 ENCOUNTER — HOSPITAL ENCOUNTER (OUTPATIENT)
Dept: CARDIOLOGY | Facility: HOSPITAL | Age: 57
Discharge: HOME | End: 2022-12-06
Admitting: INTERNAL MEDICINE

## 2022-12-06 VITALS — HEIGHT: 65 IN | WEIGHT: 188 LBS | BODY MASS INDEX: 31.32 KG/M2

## 2022-12-06 DIAGNOSIS — I42.8 NICM (NONISCHEMIC CARDIOMYOPATHY): ICD-10-CM

## 2022-12-06 PROCEDURE — 93306 TTE W/DOPPLER COMPLETE: CPT

## 2022-12-06 PROCEDURE — 93306 TTE W/DOPPLER COMPLETE: CPT | Performed by: INTERNAL MEDICINE

## 2022-12-06 PROCEDURE — 25010000002 SULFUR HEXAFLUORIDE MICROSPH 60.7-25 MG RECONSTITUTED SUSPENSION: Performed by: INTERNAL MEDICINE

## 2022-12-06 RX ADMIN — SULFUR HEXAFLUORIDE 5 ML: KIT at 11:50

## 2022-12-07 ENCOUNTER — TELEPHONE (OUTPATIENT)
Dept: CARDIOLOGY | Facility: CLINIC | Age: 57
End: 2022-12-07

## 2022-12-07 NOTE — TELEPHONE ENCOUNTER
Patient returned call. Explained  recommendations. Agrees with plan of care. Verbalized understanding. No further questions/concerns at this time.     Left voicemail message asking patient to return call.    Per , patient can return to work and regular activities. She may have decreased energy, but can return to activities as tolerated.    ----- Message from Petey Leigh MD sent at 12/6/2022  9:04 PM EST -----  Can you please call the patient to let them know that their echocardiogram was improved from before. Her ejection fraction has improved enough that we can get rid of the lifevest and keep working on her medications.

## 2022-12-16 ENCOUNTER — TELEPHONE (OUTPATIENT)
Dept: GASTROENTEROLOGY | Facility: CLINIC | Age: 57
End: 2022-12-16

## 2022-12-16 NOTE — TELEPHONE ENCOUNTER
[address]ae [address] [place] [address]  1965         Patient will be having a a colonoscopy by Dr. Rebel De Paz on January 10, 2023. The patient is needing cardiac clearance due to being on blood thinners. Please complete the following and fax back to the office.     Patient's was last seen in the office on:_____________________    Procedure/Test Performed:    _____ Stress Test       _____ Echocardiogram    _____ EKG     _____ Heart Cath    Based on the above test results and/or clinical evaluation it is my recommendation:    ____ Patient may proceed with the scheduled surgical procedure with an acceptable cardiac risk.    ____ Patient CAN NOT stop Plavix, Effient, Ticlid, Aspirin, Coumadin, Pradaxa, Xarelto, Eliquis, Savaysa, or Brilinta prior to procedure.     ____ Patient CAN stop Plavix, Effient, Ticlid, Aspirin, Coumadin, Pradaxa, Xarelto, Eliquis, Savaysa, or Brilinta  ______ days prior to procedure.    Please sign and date below.    Signature________________________________________   Date:_________________     Thank You    Mark I. Brunner, M.D.  BRAIN Montanez M.D. Mark A. Spurlin, M.D. Gregory M. Woolfolk, M.D.  Any Gutierrez, NELDA Mariscal, SAMUEL Schwarz

## 2022-12-19 ENCOUNTER — TELEPHONE (OUTPATIENT)
Dept: CARDIOLOGY | Facility: CLINIC | Age: 57
End: 2022-12-19

## 2022-12-19 LAB
BH CV ECHO MEAS - AI P1/2T: 792.2 MSEC
BH CV ECHO MEAS - AO MAX PG: 13.8 MMHG
BH CV ECHO MEAS - AO MEAN PG: 7 MMHG
BH CV ECHO MEAS - AO ROOT DIAM: 3 CM
BH CV ECHO MEAS - AO V2 MAX: 185.4 CM/SEC
BH CV ECHO MEAS - AO V2 VTI: 36.8 CM
BH CV ECHO MEAS - AVA(I,D): 1.9 CM2
BH CV ECHO MEAS - EDV(CUBED): 226.6 ML
BH CV ECHO MEAS - EDV(MOD-SP2): 180 ML
BH CV ECHO MEAS - EDV(MOD-SP4): 184 ML
BH CV ECHO MEAS - EF(MOD-BP): 41 %
BH CV ECHO MEAS - EF(MOD-SP2): 44.4 %
BH CV ECHO MEAS - EF(MOD-SP4): 38.6 %
BH CV ECHO MEAS - ESV(CUBED): 97.7 ML
BH CV ECHO MEAS - ESV(MOD-SP2): 100 ML
BH CV ECHO MEAS - ESV(MOD-SP4): 113 ML
BH CV ECHO MEAS - FS: 24.5 %
BH CV ECHO MEAS - IVS/LVPW: 1 CM
BH CV ECHO MEAS - IVSD: 0.78 CM
BH CV ECHO MEAS - LA DIMENSION: 4.7 CM
BH CV ECHO MEAS - LAT PEAK E' VEL: 4.1 CM/SEC
BH CV ECHO MEAS - LV MASS(C)D: 185.9 GRAMS
BH CV ECHO MEAS - LV MAX PG: 4.9 MMHG
BH CV ECHO MEAS - LV MEAN PG: 2.9 MMHG
BH CV ECHO MEAS - LV V1 MAX: 110.1 CM/SEC
BH CV ECHO MEAS - LV V1 VTI: 21.7 CM
BH CV ECHO MEAS - LVIDD: 6.1 CM
BH CV ECHO MEAS - LVIDS: 4.6 CM
BH CV ECHO MEAS - LVOT AREA: 3.2 CM2
BH CV ECHO MEAS - LVOT DIAM: 2.03 CM
BH CV ECHO MEAS - LVPWD: 0.78 CM
BH CV ECHO MEAS - MED PEAK E' VEL: 4.1 CM/SEC
BH CV ECHO MEAS - MV A MAX VEL: 96 CM/SEC
BH CV ECHO MEAS - MV DEC SLOPE: 239.1 CM/SEC2
BH CV ECHO MEAS - MV DEC TIME: 0.29 MSEC
BH CV ECHO MEAS - MV E MAX VEL: 64.7 CM/SEC
BH CV ECHO MEAS - MV E/A: 0.67
BH CV ECHO MEAS - MV MAX PG: 4.1 MMHG
BH CV ECHO MEAS - MV MEAN PG: 2.1 MMHG
BH CV ECHO MEAS - MV P1/2T: 100 MSEC
BH CV ECHO MEAS - MV V2 VTI: 26.7 CM
BH CV ECHO MEAS - MVA(P1/2T): 2.2 CM2
BH CV ECHO MEAS - MVA(VTI): 2.6 CM2
BH CV ECHO MEAS - PA ACC TIME: 0.1 SEC
BH CV ECHO MEAS - PA PR(ACCEL): 36.2 MMHG
BH CV ECHO MEAS - PA V2 MAX: 96.6 CM/SEC
BH CV ECHO MEAS - SV(LVOT): 69.8 ML
BH CV ECHO MEAS - SV(MOD-SP2): 80 ML
BH CV ECHO MEAS - SV(MOD-SP4): 71 ML
BH CV ECHO MEAS - TAPSE (>1.6): 2.6 CM
BH CV ECHO MEASUREMENTS AVERAGE E/E' RATIO: 15.78
BH CV VAS BP RIGHT ARM: NORMAL MMHG
BH CV XLRA - RV BASE: 3.6 CM
BH CV XLRA - RV LENGTH: 6.4 CM
BH CV XLRA - RV MID: 2.5 CM
BH CV XLRA - TDI S': 13.3 CM/SEC
LEFT ATRIUM VOLUME INDEX: 33.2 ML/M2
MAXIMAL PREDICTED HEART RATE: 163 BPM
STRESS TARGET HR: 139 BPM

## 2022-12-19 NOTE — TELEPHONE ENCOUNTER
Patient states she has 10 pills of Xarelto remaining in prescription. Per , continue taking the remaining 10 pills as prescribed and then discontinue prescription.    [facility] pharmacy notified by phone to discontinue refills.

## 2022-12-19 NOTE — TELEPHONE ENCOUNTER
Patient notified. Cardiac risk assessment letter forwarded to Dr. De Paz office.    Please provide cardiac risk assessment for upcoming colonoscopy and if patient should stop Xarelto and/or aspirin prior to procedure.

## 2022-12-22 ENCOUNTER — OFFICE VISIT (OUTPATIENT)
Dept: CARDIOLOGY | Facility: HOSPITAL | Age: 57
End: 2022-12-22

## 2022-12-22 VITALS
DIASTOLIC BLOOD PRESSURE: 92 MMHG | TEMPERATURE: 96.5 F | HEART RATE: 69 BPM | SYSTOLIC BLOOD PRESSURE: 146 MMHG | RESPIRATION RATE: 20 BRPM | OXYGEN SATURATION: 98 % | BODY MASS INDEX: 27.16 KG/M2 | WEIGHT: 163 LBS | HEIGHT: 65 IN

## 2022-12-22 DIAGNOSIS — I42.8 NICM (NONISCHEMIC CARDIOMYOPATHY): ICD-10-CM

## 2022-12-22 PROCEDURE — 99214 OFFICE O/P EST MOD 30 MIN: CPT | Performed by: NURSE PRACTITIONER

## 2022-12-22 RX ORDER — TORSEMIDE 10 MG/1
5 TABLET ORAL DAILY PRN
Qty: 30 TABLET | Refills: 6
Start: 2022-12-22

## 2022-12-22 NOTE — PROGRESS NOTES
Chief Complaint  Congestive Heart Failure    Subjective      History of Present Illness {  Problem List  Visit  Diagnosis   Encounters  Notes  Medications  Labs  Result Review Imaging  Media :23}     Nae Sanchez, 57 y.o. female with newly diagnosed HFrEF/NICM, presents to [facility] clinic for Congestive Heart Failure .    Patient recently hospitalized at [facility] presented due to dyspnea and fatigue and found to have new congestive heart failure.  Troponin was elevated at 0.551, proBNP was elevated at 49,000. CTA of the chest was negative for PE but did show small to moderate bilateral pleural effusions with scattered bilateral atelectasis and mediastinal lymphadenopathy, likely reactive. TTE was obtained and showed an ejection fraction of 25%, diastolic dysfunction, and multiple areas of wall motion abnormalities.  Cardiology was consulted and she underwent a left heart cath on 9/1/2022 that showed nonobstructive coronary artery disease, dilated nonischemic cardiomyopathy with severe left ventricular systolic dysfunction, and possible left apical ventricular filling defect suggestive of apical thrombus.  She was started on Xarelto empirically and will continue this on discharge.  She will be discharged on Coreg 6.25 mg twice daily as well as torsemide 10 mg daily.  Had some borderline hypotension on valsartan twice daily, will discharge on once daily valsartan.    Patient recently established outpatient with ; patient was continued on current GDMT.  Since previous evaluation patient completed follow-up TTE completed 12/6/2022 with improvement of LVEF 41%, no LV thrombus; LifeVest was discontinued given her improvement in LV systolic function, Xarelto also recently discontinued.    Nae presents today doing well overall from a cardiovascular standpoint and much improved since hospitalization. Weight is stable at home; edema resolved. She is compliant  "with cardiac medications. Happy to have Lifevest removed. Progressing her activity and back to work.  She denies chest pain, dyspnea, palpitations/tachypalpitation, syncope. -130s on home monitoring. Currently taking torsemide every other day with no edema. Scheduled to establish with nephrology in 3 weeks.       Objective     Vital Signs:   Vitals:    12/22/22 1049 12/22/22 1108   BP: 171/92 146/92   BP Location: Left arm    Patient Position: Sitting    Cuff Size: Adult    Pulse: 69    Resp: 20    Temp: 96.5 °F (35.8 °C)    TempSrc: Temporal    SpO2: 98%    Weight: 73.9 kg (163 lb)    Height: 165.1 cm (65\")      Body mass index is 27.12 kg/m².  Physical Exam  Vitals and nursing note reviewed.   Constitutional:       Appearance: Normal appearance.   HENT:      Head: Normocephalic.   Eyes:      Extraocular Movements: Extraocular movements intact.   Neck:      Vascular: No carotid bruit.   Cardiovascular:      Rate and Rhythm: Normal rate and regular rhythm.      Pulses: Normal pulses.      Heart sounds: Normal heart sounds, S1 normal and S2 normal. No murmur heard.  Pulmonary:      Effort: Pulmonary effort is normal. No respiratory distress.      Breath sounds: Normal breath sounds.   Musculoskeletal:      Cervical back: Neck supple.      Right lower leg: No edema.      Left lower leg: No edema.   Skin:     General: Skin is warm and dry.   Neurological:      General: No focal deficit present.      Mental Status: She is alert.   Psychiatric:         Mood and Affect: Mood normal.         Behavior: Behavior normal.         Thought Content: Thought content normal.        Data Reviewed:{ Labs  Result Review  Imaging  Med Tab  Media :23}     Comprehensive Metabolic Panel (11/22/2022 11:29)  Adult Transthoracic Echo Complete W/ Cont if Necessary Per Protocol (08/30/2022 12:22)  Cardiac Catheterization/Vascular Study (09/01/2022 14:45)  Adult Transthoracic Echo Complete W/ Cont if Necessary Per Protocol " (12/06/2022 11:38)    Discharge Summary by Joy Su MD (09/03/2022 13:25)  Progress Notes by Byron Aecves MD (09/03/2022 07:55)  Basic Metabolic Panel (09/08/2022 10:47)  Renal Function Panel (09/03/2022 04:17)  Magnesium (09/03/2022 04:17)  CBC & Differential (09/02/2022 03:47)  Lipid Panel (08/31/2022 05:18)  TSH (08/31/2022 05:18)  ECG 12 Lead (09/01/2022 09:05)      Assessment & Plan   Assessment and Plan {CC Problem List  Visit Diagnosis  ROS  Review (Popup)  Health Maintenance  Quality  BestPractice  Medications  SmartSets  SnapShot Encounters  Media :23}     1. Chronic systolic heart failure/HFrEF (CMS/HCC)  -TTE completed 12/6/2022 with improvement of LVEF 41%, no LV thrombus; LifeVest was discontinued given her improvement in LV systolic function, Xarelto also recently discontinued.  -[facility] 9/1/2022 with nonobstructive coronary artery disease, dilated nonischemic cardiomyopathy with severe left ventricular systolic dysfunction, and possible left apical ventricular filling defect suggestive of apical thrombus.    -Appears euvolemic and well compensated  -Continue carvedilol to 12.5mg BID  -Continue valsartan, Jardiance, torsemide  -Decrease torsemide to PRN given she is euvolemic, instructed to contact cardiology/HVC if weight gain/edema not resolved with PRN torsemide  -Continue follow-up with  as scheduled    2. NICM (nonischemic cardiomyopathy) (HCC)  -TTE with improvement in LVEF as above-[facility] 9/1/2022 with nonobstructive coronary artery disease, dilated nonischemic cardiomyopathy with severe left ventricular systolic dysfunction, and possible left apical ventricular filling defect suggestive of apical thrombus.   -Appears near euvolemic and well compensated  -Continue carvedilol, valsartan, Jardiance    3. Non-obstructive CAD  -[facility] with nonobstructive CAD as above  -Continue ASA      Follow Up {Instructions Charge Capture  Follow-up Communications :23}      Return if symptoms worsen or fail to improve.      Patient was given instructions and counseling regarding her condition or for health maintenance advice. Please see specific information pulled into the AVS if appropriate.  Patient was instructed to call the [facility] with any questions, concerns, or worsening symptoms.    Dictated Utilizing Dragon Dictation   Please note that portions of this note were completed with a voice recognition program.   Part of this note may be an electronic transcription/translation of spoken language to printed text using the Dragon Dictation System.

## 2022-12-22 NOTE — PATIENT INSTRUCTIONS
- Change torsemide to as needed for weight gain more than 3lb overnight or 5lb in one week, or increased swelling in legs    - Contact heart clinic if symptoms do not improve with water pill

## 2023-01-01 DIAGNOSIS — E53.8 FOLATE DEFICIENCY: ICD-10-CM

## 2023-01-03 RX ORDER — FOLIC ACID 1 MG/1
TABLET ORAL
Qty: 90 TABLET | Refills: 0 | Status: SHIPPED | OUTPATIENT
Start: 2023-01-03

## 2023-01-09 ENCOUNTER — ANESTHESIA EVENT (OUTPATIENT)
Dept: GASTROENTEROLOGY | Facility: HOSPITAL | Age: 58
End: 2023-01-09
Payer: COMMERCIAL

## 2023-01-09 DIAGNOSIS — Z12.11 SCREENING FOR COLON CANCER: Primary | ICD-10-CM

## 2023-01-09 RX ORDER — FENTANYL CITRATE 50 UG/ML
50 INJECTION, SOLUTION INTRAMUSCULAR; INTRAVENOUS
Status: CANCELLED | OUTPATIENT
Start: 2023-01-09

## 2023-01-09 RX ORDER — HYDROMORPHONE HYDROCHLORIDE 1 MG/ML
0.5 INJECTION, SOLUTION INTRAMUSCULAR; INTRAVENOUS; SUBCUTANEOUS
Status: CANCELLED | OUTPATIENT
Start: 2023-01-09

## 2023-01-09 RX ORDER — SODIUM CHLORIDE, SODIUM LACTATE, POTASSIUM CHLORIDE, CALCIUM CHLORIDE 600; 310; 30; 20 MG/100ML; MG/100ML; MG/100ML; MG/100ML
9 INJECTION, SOLUTION INTRAVENOUS CONTINUOUS
Status: CANCELLED | OUTPATIENT
Start: 2023-01-09

## 2023-01-09 RX ORDER — SODIUM CHLORIDE 9 MG/ML
40 INJECTION, SOLUTION INTRAVENOUS AS NEEDED
Status: CANCELLED | OUTPATIENT
Start: 2023-01-09

## 2023-01-09 RX ORDER — SODIUM CHLORIDE 0.9 % (FLUSH) 0.9 %
10 SYRINGE (ML) INJECTION EVERY 12 HOURS SCHEDULED
Status: CANCELLED | OUTPATIENT
Start: 2023-01-09

## 2023-01-09 RX ORDER — FAMOTIDINE 20 MG/1
20 TABLET, FILM COATED ORAL ONCE
Status: CANCELLED | OUTPATIENT
Start: 2023-01-09 | End: 2023-01-09

## 2023-01-09 RX ORDER — MIDAZOLAM HYDROCHLORIDE 1 MG/ML
1 INJECTION INTRAMUSCULAR; INTRAVENOUS
Status: CANCELLED | OUTPATIENT
Start: 2023-01-09

## 2023-01-09 RX ORDER — LIDOCAINE HYDROCHLORIDE 10 MG/ML
0.5 INJECTION, SOLUTION EPIDURAL; INFILTRATION; INTRACAUDAL; PERINEURAL ONCE AS NEEDED
Status: CANCELLED | OUTPATIENT
Start: 2023-01-09

## 2023-01-10 ENCOUNTER — ANESTHESIA (OUTPATIENT)
Dept: GASTROENTEROLOGY | Facility: HOSPITAL | Age: 58
End: 2023-01-10
Payer: COMMERCIAL

## 2023-01-10 ENCOUNTER — HOSPITAL ENCOUNTER (OUTPATIENT)
Facility: HOSPITAL | Age: 58
Setting detail: HOSPITAL OUTPATIENT SURGERY
Discharge: HOME | End: 2023-01-10
Attending: INTERNAL MEDICINE | Admitting: INTERNAL MEDICINE
Payer: COMMERCIAL

## 2023-01-10 VITALS
DIASTOLIC BLOOD PRESSURE: 74 MMHG | HEART RATE: 60 BPM | SYSTOLIC BLOOD PRESSURE: 142 MMHG | RESPIRATION RATE: 12 BRPM | OXYGEN SATURATION: 97 % | TEMPERATURE: 97.3 F

## 2023-01-10 PROCEDURE — 45378 DIAGNOSTIC COLONOSCOPY: CPT | Performed by: INTERNAL MEDICINE

## 2023-01-10 PROCEDURE — 25010000002 PROPOFOL 10 MG/ML EMULSION: Performed by: NURSE ANESTHETIST, CERTIFIED REGISTERED

## 2023-01-10 PROCEDURE — S0260 H&P FOR SURGERY: HCPCS | Performed by: INTERNAL MEDICINE

## 2023-01-10 RX ORDER — SODIUM CHLORIDE 9 MG/ML
75 INJECTION, SOLUTION INTRAVENOUS ONCE
Status: COMPLETED | OUTPATIENT
Start: 2023-01-10 | End: 2023-01-10

## 2023-01-10 RX ORDER — LIDOCAINE HYDROCHLORIDE 10 MG/ML
INJECTION, SOLUTION EPIDURAL; INFILTRATION; INTRACAUDAL; PERINEURAL AS NEEDED
Status: DISCONTINUED | OUTPATIENT
Start: 2023-01-10 | End: 2023-01-10 | Stop reason: SURG

## 2023-01-10 RX ORDER — SODIUM CHLORIDE 0.9 % (FLUSH) 0.9 %
10 SYRINGE (ML) INJECTION AS NEEDED
Status: DISCONTINUED | OUTPATIENT
Start: 2023-01-10 | End: 2023-01-10 | Stop reason: HOSPADM

## 2023-01-10 RX ORDER — PROPOFOL 10 MG/ML
VIAL (ML) INTRAVENOUS AS NEEDED
Status: DISCONTINUED | OUTPATIENT
Start: 2023-01-10 | End: 2023-01-10 | Stop reason: SURG

## 2023-01-10 RX ORDER — FAMOTIDINE 10 MG/ML
20 INJECTION, SOLUTION INTRAVENOUS ONCE
Status: COMPLETED | OUTPATIENT
Start: 2023-01-10 | End: 2023-01-10

## 2023-01-10 RX ORDER — SODIUM CHLORIDE 9 MG/ML
INJECTION, SOLUTION INTRAVENOUS CONTINUOUS PRN
Status: DISCONTINUED | OUTPATIENT
Start: 2023-01-10 | End: 2023-01-10 | Stop reason: SURG

## 2023-01-10 RX ADMIN — Medication 10 ML: at 13:10

## 2023-01-10 RX ADMIN — PROPOFOL 40 MG: 10 INJECTION, EMULSION INTRAVENOUS at 13:44

## 2023-01-10 RX ADMIN — SODIUM CHLORIDE: 9 INJECTION, SOLUTION INTRAVENOUS at 13:37

## 2023-01-10 RX ADMIN — SODIUM CHLORIDE 75 ML/HR: 9 INJECTION, SOLUTION INTRAVENOUS at 13:10

## 2023-01-10 RX ADMIN — PROPOFOL 50 MG: 10 INJECTION, EMULSION INTRAVENOUS at 13:48

## 2023-01-10 RX ADMIN — PROPOFOL 40 MG: 10 INJECTION, EMULSION INTRAVENOUS at 13:38

## 2023-01-10 RX ADMIN — LIDOCAINE HYDROCHLORIDE 50 MG: 10 INJECTION, SOLUTION EPIDURAL; INFILTRATION; INTRACAUDAL; PERINEURAL at 13:37

## 2023-01-10 RX ADMIN — PROPOFOL 40 MG: 10 INJECTION, EMULSION INTRAVENOUS at 13:40

## 2023-01-10 RX ADMIN — FAMOTIDINE 20 MG: 10 INJECTION INTRAVENOUS at 13:11

## 2023-01-10 RX ADMIN — PROPOFOL 40 MG: 10 INJECTION, EMULSION INTRAVENOUS at 13:37

## 2023-01-10 RX ADMIN — PROPOFOL 40 MG: 10 INJECTION, EMULSION INTRAVENOUS at 13:42

## 2023-01-10 NOTE — ANESTHESIA POSTPROCEDURE EVALUATION
Patient: Nae Sanchez    Procedure Summary     Date: 01/10/23 Room / Location:  [facility]    Anesthesia Start: 1333 Anesthesia Stop: 1405    Procedure: COLONOSCOPY Diagnosis:       Encounter for screening colonoscopy      (Encounter for screening colonoscopy [Z12.11])    Surgeons: Rebel De Paz MD Provider: Juarez Adame MD    Anesthesia Type: general ASA Status: 3          Anesthesia Type: general    Vitals  Vitals Value Taken Time   /67 01/10/23 1403   Temp 97.3 °F (36.3 °C) 01/10/23 1403   Pulse 58 01/10/23 1403   Resp 12 01/10/23 1403   SpO2 99 % 01/10/23 1403           Post Anesthesia Care and Evaluation    Patient location during evaluation: PACU  Patient participation: complete - patient participated  Level of consciousness: awake and alert  Pain management: adequate    Airway patency: patent  Anesthetic complications: No anesthetic complications  PONV Status: none  Cardiovascular status: hemodynamically stable and acceptable  Respiratory status: nonlabored ventilation, acceptable and nasal cannula  Hydration status: acceptable

## 2023-01-10 NOTE — H&P
HPI: Ms. Sanchez is a 56 yo with a history of heart failure and sleep apnea. She presents for a follow up colonoscopy. The patient reportedly had a polyp on previous examination several years ago. The patient denies abdominal pain or nausea. There is no issue with blood in the stool. Ms. Sanchez denies unexplained weight loss.    PMH: Anxiety            BARBARA            CHF            NICM    PSH: Cardiac cath            Tubal ligation            Foot surgery  All: NKDA  Meds: see list  Fam HX: Maternal grandmother with colon cancer  Soc HX: Quit tobacco 2022  ROS: SEE HPI    Phy exam: Gen- pleasant female, no acute distress                    HEENT- oropharynx clear                    Chest- clear                    Cardiac- s1s2                    Abd- soft, bowel sounds present                    Ext- minimal pretibial edema                    Neuro- awake, alert    Imp: Personal history of colon polyp  Plan: Will proceed with colonoscopy

## 2023-01-10 NOTE — ANESTHESIA PREPROCEDURE EVALUATION
Anesthesia Evaluation     Patient summary reviewed and Nursing notes reviewed                Airway   Mallampati: I  TM distance: >3 FB  Neck ROM: full  No difficulty expected  Dental - normal exam     Pulmonary - normal exam   (+) a smoker Former, COPD, sleep apnea,   Cardiovascular - normal exam    (+) hypertension, valvular problems/murmurs murmur, past MI , CAD, CHF ,       Neuro/Psych  (+) psychiatric history Anxiety, Depression and ADHD,    GI/Hepatic/Renal/Endo    (+)  PUD,  liver disease history of elevated LFT, renal disease CRI,     Musculoskeletal (-) negative ROS    Abdominal  - normal exam    Bowel sounds: normal.   Substance History - negative use     OB/GYN negative ob/gyn ROS         Other                        Anesthesia Plan    ASA 3     general     (PROPOFOL)  intravenous induction     Anesthetic plan, risks, benefits, and alternatives have been provided, discussed and informed consent has been obtained with: patient.    Plan discussed with CRNA.        CODE STATUS:

## 2023-01-17 NOTE — PROGRESS NOTES
In reference to patient's discharge date 9/3/2022.  She was not discharged on SGLT2 inhibitor or mineralocorticoid receptor antagonist due to recent diagnosis of systolic heart failure and to avoid polypharmacy with initiation of multiple medications at the same time

## 2023-02-08 ENCOUNTER — OFFICE VISIT (OUTPATIENT)
Dept: CARDIOLOGY | Facility: CLINIC | Age: 58
End: 2023-02-08
Payer: COMMERCIAL

## 2023-02-08 VITALS
HEIGHT: 65 IN | DIASTOLIC BLOOD PRESSURE: 82 MMHG | HEART RATE: 79 BPM | WEIGHT: 167 LBS | OXYGEN SATURATION: 97 % | BODY MASS INDEX: 27.82 KG/M2 | SYSTOLIC BLOOD PRESSURE: 128 MMHG

## 2023-02-08 DIAGNOSIS — I42.8 NICM (NONISCHEMIC CARDIOMYOPATHY): Primary | ICD-10-CM

## 2023-02-08 DIAGNOSIS — I10 PRIMARY HYPERTENSION: ICD-10-CM

## 2023-02-08 DIAGNOSIS — I50.22 CHRONIC SYSTOLIC HEART FAILURE: ICD-10-CM

## 2023-02-08 PROCEDURE — 99214 OFFICE O/P EST MOD 30 MIN: CPT | Performed by: INTERNAL MEDICINE

## 2023-02-08 RX ORDER — B-COMPLEX WITH VITAMIN C
1 TABLET ORAL DAILY
COMMUNITY

## 2023-02-08 RX ORDER — PREDNISONE 10 MG/1
TABLET ORAL
COMMUNITY
Start: 2023-02-06 | End: 2023-02-15

## 2023-02-08 RX ORDER — CARVEDILOL 12.5 MG/1
18.75 TABLET ORAL 2 TIMES DAILY WITH MEALS
Qty: 90 TABLET | Refills: 5 | Status: SHIPPED | OUTPATIENT
Start: 2023-02-08 | End: 2023-02-22 | Stop reason: SDUPTHER

## 2023-02-08 NOTE — PROGRESS NOTES
Established Patient Office Visit    Patient Name: Nae Sanchez  : 1965   MRN: [id number]   Care Team: Patient Care Team:  Sandee Keen PA-C as PCP - General (Physician Assistant)  Petey Leigh MD as Cardiologist (Cardiology)    Chief Complaint   Patient presents with   • NICM (nonischemic cardiomyopathy) ([facility])     HPI: Nae Sanchez is a 57 y.o. female with a history of previous tobacco use, COPD, and nonischemic, dilated cardiomyopathy, resolve apical thrombus who presents today for routine follow-up.  Since her last visit with me she has been seen in the heart valve clinic as well as by nephrology at [facility].  She has decreased torsemide to as needed if she should develop leg swelling and had to take a dose within the past few days but has not felt like she has any of them daily.  She does report having some persistent orthopnea even though her leg swelling is resolved and weights are stable.    She continues to work at [employer] and remains active, walking 5 to 10 miles per day at work.  She does report some dyspnea however this is significantly improved from after her initial diagnosis.  She continues to take a log of her blood pressure which is largely been normal with systolics in the 120s however she has had some in the 130s as well as 100s.    Subjective   Review of Systems   Constitutional: Positive for fatigue. Negative for activity change.   Respiratory: Positive for shortness of breath.    Cardiovascular: Negative for chest pain, palpitations and leg swelling.       Social History     Tobacco Use   Smoking Status Former   • Packs/day: 2.00   • Years: 40.00   • Pack years: 80.00   • Types: Cigarettes   • Start date: 1982   • Quit date: 2022   • Years since quittin.4   Smokeless Tobacco Former   • Quit date: 2022      No Known Allergies      Current Outpatient Medications:   •  aspirin 81 MG EC tablet, Take 1 tablet by mouth Daily., Disp: 30  "tablet, Rfl: 6  •  carvedilol (COREG) 12.5 MG tablet, Take 1.5 tablets by mouth 2 (Two) Times a Day With Meals for 360 doses., Disp: 90 tablet, Rfl: 5  •  desvenlafaxine (PRISTIQ) 100 MG 24 hr tablet, Take 100 mg by mouth Every Morning., Disp: , Rfl:   •  empagliflozin (JARDIANCE) 10 MG tablet tablet, Take 1 tablet by mouth Daily., Disp: 90 tablet, Rfl: 1  •  gabapentin (NEURONTIN) 300 MG capsule, Take 1 capsule by mouth Every Night., Disp: 30 capsule, Rfl: 2  •  lisdexamfetamine (VYVANSE) 60 MG capsule, Take 60 mg by mouth Every Morning, Disp: , Rfl:   •  nicotine (Nicoderm CQ) 21 MG/24HR patch, Place 1 patch on the skin as directed by provider Daily., Disp: 28 each, Rfl: 1  •  torsemide (DEMADEX) 10 MG tablet, Take 0.5 tablets by mouth Daily As Needed (weight gain of 3lb or increased LE swelling)., Disp: 30 tablet, Rfl: 6  •  valsartan (DIOVAN) 40 MG tablet, Take 1 tablet by mouth Daily., Disp: 30 tablet, Rfl: 6  •  Calcium Carbonate-Vitamin D (Oyster Shell Calcium/D) 500-5 MG-MCG tablet, Take 1 tablet by mouth Daily., Disp: , Rfl:   •  folic acid (FOLVITE) 1 MG tablet, TAKE 1 TABLET BY MOUTH EVERY DAY, Disp: 90 tablet, Rfl: 0  •  predniSONE (DELTASONE) 10 MG tablet, , Disp: , Rfl:       Objective     Vitals:    02/08/23 1554   BP: 128/82   BP Location: Right arm   Patient Position: Sitting   Pulse: 79   SpO2: 97%   Weight: 75.8 kg (167 lb)   Height: 165.1 cm (65\")     Body mass index is 27.79 kg/m².  Gen: well developed, white female, sitting up on exam table  HEENT: MMM, sclera anicteric, conjunctiva normal  CV: regular rate, regular rhythm, no murmurs or rubs, normal S1, S2. 2+ radial pulses  Pulm: RA, normal work of breathing, no wheezes, rales, rhonchi  Ext: normal bulk for age, normal tone, no dependent edema  Neuro: alert, oriented, face symmetrical, moving all extremities well  Psych: normal mood, appropriate affect    RESULTS:     [facility] 9/1/2022  FINAL IMPRESSION:  · Angulated/tortuous proximal circumflex " with nonocclusive (up to 50%) plaque with normal IFR of 1.0.  · Angiographically near normal left main, the LAD and the dominant RCA.  · Dilated, probably nonischemic cardiomyopathy with severe left ventricular systolic dysfunction, estimated ejection fraction 30%.  · Left ventricular apical filling defect highly suggestive of apical thrombus.    Results for orders placed during the hospital encounter of 12/06/22    Adult Transthoracic Echo Complete W/ Cont if Necessary Per Protocol    Interpretation Summary  •  The left ventricular cavity is mildly dilated.  •  Left ventricular systolic function is mildly decreased. Calculated left ventricular EF = 41%  •  Left ventricular diastolic function is consistent with (grade Ia w/high LAP) impaired relaxation.  •  EF is improved from previous.  •  No LV thrombus seen with LV opacification agent    Labs:  Lab Results   Component Value Date    WBC 12.27 (H) 01/12/2023    HGB 13.6 01/12/2023    HCT 41.8 01/12/2023    MCV 86 01/12/2023     (H) 01/12/2023     Renal function panel January 12, 2023 done at   Creatinine 1.61, potassium 5.1, total CO2 31    Lab Results   Component Value Date    HGBA1C 5.70 (H) 08/31/2022     Lab Results   Component Value Date    CHOL 112 08/31/2022    TRIG 82 08/31/2022    HDL 30 (L) 08/31/2022    LDL 66 08/31/2022     Most recent PCP note, imaging tests, and labs reviewed.    Assessment & Plan       ICD-10-CM ICD-9-CM   1. Chronic systolic heart failure (HCC)  I50.22 428.22      Chronic heart failure with reduced ejection fraction, EF improved to 41%  Nonischemic cardiomyopathy  LV thrombus, resolved   - GDMT with carvedilol, valsartan   - Increase carvedilol to 18.75 mg twice daily and continue to monitor blood pressure   - Defer any changes to valsartan due to mildly elevated potassium   - If the patient has orthopnea tonight and instructed to take a dose of torsemide tomorrow morning and monitor her symptoms the following day.  Would  prefer to keep as needed dosing given her CKD    Primary hypertension, well controlled   - Changes as above    Stage III CKD   - Following with [facility]   - We will defer any changes to valsartan at this visit given her mildly elevated potassium in January    Return in about 4 months (around 6/8/2023).    KAMALJIT Leigh MD, MS  02/08/23    [facility]  [address] [place] [id number] [phone]

## 2023-02-08 NOTE — PATIENT INSTRUCTIONS
We will increase your carvedilol dose to 18.75mg two times per day (1.5 of the 12.5mg tablets)    Keep checking your blood pressure and let us know if BP is less than 100 and we can return you to the lower dose.

## 2023-02-15 ENCOUNTER — OFFICE VISIT (OUTPATIENT)
Dept: FAMILY MEDICINE CLINIC | Facility: CLINIC | Age: 58
End: 2023-02-15
Payer: COMMERCIAL

## 2023-02-15 VITALS
BODY MASS INDEX: 28.42 KG/M2 | WEIGHT: 170.6 LBS | HEIGHT: 65 IN | HEART RATE: 66 BPM | SYSTOLIC BLOOD PRESSURE: 132 MMHG | DIASTOLIC BLOOD PRESSURE: 90 MMHG | OXYGEN SATURATION: 98 % | TEMPERATURE: 98.6 F

## 2023-02-15 DIAGNOSIS — G62.9 NEUROPATHY: Primary | ICD-10-CM

## 2023-02-15 DIAGNOSIS — I10 PRIMARY HYPERTENSION: ICD-10-CM

## 2023-02-15 DIAGNOSIS — Z72.0 TOBACCO USE: ICD-10-CM

## 2023-02-15 PROCEDURE — 99214 OFFICE O/P EST MOD 30 MIN: CPT | Performed by: PHYSICIAN ASSISTANT

## 2023-02-15 RX ORDER — GABAPENTIN 300 MG/1
300 CAPSULE ORAL NIGHTLY
Qty: 30 CAPSULE | Refills: 2 | Status: CANCELLED | OUTPATIENT
Start: 2023-02-15

## 2023-02-15 RX ORDER — GABAPENTIN 300 MG/1
300 CAPSULE ORAL NIGHTLY
Qty: 30 CAPSULE | Refills: 2 | Status: SHIPPED | OUTPATIENT
Start: 2023-02-15

## 2023-02-15 RX ORDER — NICOTINE 21 MG/24HR
1 PATCH, TRANSDERMAL 24 HOURS TRANSDERMAL EVERY 24 HOURS
Qty: 28 EACH | Refills: 2 | Status: SHIPPED | OUTPATIENT
Start: 2023-02-15

## 2023-02-15 NOTE — PROGRESS NOTES
Chief Complaint   Patient presents with   • Peripheral Neuropathy       Nae Sanchez is a pleasant 57 y.o. female who is here for routine follow-up of peripheral neuropathy, tobacco abuse, hypertension and plantar fasciitis.  Patient's blood pressure is borderline today.  She is compliant on medication.  She is asymptomatic for hypertension.  She is doing well with quitting smoking.  She is currently on the NicoDerm 21 mg patch and has not had any cigarettes since September.  She is ready to reduce her patch dose.  She smoked for 40+ years.  Has gained some weight.  She states she is eating healthy overall.  She does not need any sugary foods or beverages.  She reports recently being treated for Planter fasciitis with steroid injections by a podiatrist.  She is doing well and has no pain today.    Past Medical History:   Diagnosis Date   • ADHD (attention deficit hyperactivity disorder) 2019   • Anxiety    • CHF (congestive heart failure) ([facility]) 08/29/2022   • Coronary artery disease 08/29/2022   • Depression    • Heart murmur 1965   • Hypertension 08/29/2022   • Myocardial infarction ([facility]) 08/29/2022   • Peptic ulceration 2019   • Sleep apnea  noticed       Past Surgical History:   Procedure Laterality Date   • CARDIAC CATHETERIZATION N/A 09/01/2022    Procedure: Left Heart Cath;  Surgeon: Greta Sanchez MD;  Location:  [facility];  Service: Cardiology;  Laterality: N/A;   • COLONOSCOPY  2019   • COLONOSCOPY N/A 1/10/2023    Procedure: COLONOSCOPY;  Surgeon: Rebel De Paz MD;  Location:  [facility];  Service: Gastroenterology;  Laterality: N/A;   • FOOT SURGERY Left    • FRACTURE SURGERY  05/25/21    Left foot   • TUBAL ABDOMINAL LIGATION  09/21/97       Family History   Problem Relation Age of Onset   • Anxiety disorder Mother    • Hypertension Father    • Diabetes Father    • No Known Problems Sister    • Heart disease Brother    • Early death Brother    • Mental  "illness Daughter    • Breast cancer Neg Hx        Social History     Socioeconomic History   • Marital status:    Tobacco Use   • Smoking status: Former     Packs/day: 2.00     Years: 40.00     Pack years: 80.00     Types: Cigarettes     Start date: 1982     Quit date: 2022     Years since quittin.4     Passive exposure: Never   • Smokeless tobacco: Former     Quit date: 2022   Vaping Use   • Vaping Use: Never used   Substance and Sexual Activity   • Alcohol use: No   • Drug use: No   • Sexual activity: Not Currently     Partners: Male     Birth control/protection: Tubal ligation       No Known Allergies    ROS  Review of Systems   Constitutional: Negative for chills and fever.   Respiratory: Negative for cough, shortness of breath and wheezing.    Cardiovascular: Negative for chest pain, palpitations and leg swelling.   Neurological: Negative for dizziness and headache.       Vitals:    02/15/23 1043   BP: 132/90   BP Location: Left arm   Patient Position: Sitting   Cuff Size: Adult   Pulse: 66   Temp: 98.6 °F (37 °C)   TempSrc: Temporal   SpO2: 98%   Weight: 77.4 kg (170 lb 9.6 oz)   Height: 165.1 cm (65\")   PainSc: 0-No pain     Body mass index is 28.39 kg/m².    Current Outpatient Medications on File Prior to Visit   Medication Sig Dispense Refill   • aspirin 81 MG EC tablet Take 1 tablet by mouth Daily. 30 tablet 6   • Calcium Carbonate-Vitamin D (Oyster Shell Calcium/D) 500-5 MG-MCG tablet Take 1 tablet by mouth Daily.     • carvedilol (COREG) 12.5 MG tablet Take 1.5 tablets by mouth 2 (Two) Times a Day With Meals for 360 doses. 90 tablet 5   • desvenlafaxine (PRISTIQ) 100 MG 24 hr tablet Take 100 mg by mouth Every Morning.     • empagliflozin (JARDIANCE) 10 MG tablet tablet Take 1 tablet by mouth Daily. 90 tablet 1   • folic acid (FOLVITE) 1 MG tablet TAKE 1 TABLET BY MOUTH EVERY DAY 90 tablet 0   • lisdexamfetamine (VYVANSE) 60 MG capsule Take 60 mg by mouth Every Morning     • " torsemide (DEMADEX) 10 MG tablet Take 0.5 tablets by mouth Daily As Needed (weight gain of 3lb or increased LE swelling). 30 tablet 6   • valsartan (DIOVAN) 40 MG tablet Take 1 tablet by mouth Daily. 30 tablet 6   • [DISCONTINUED] gabapentin (NEURONTIN) 300 MG capsule Take 1 capsule by mouth Every Night. 30 capsule 2   • [DISCONTINUED] nicotine (Nicoderm CQ) 21 MG/24HR patch Place 1 patch on the skin as directed by provider Daily. 28 each 1   • [DISCONTINUED] predniSONE (DELTASONE) 10 MG tablet        No current facility-administered medications on file prior to visit.       Results for orders placed or performed during the hospital encounter of 12/06/22   Adult Transthoracic Echo Complete W/ Cont if Necessary Per Protocol   Result Value Ref Range    Target HR (85%) 139 bpm    Max. Pred. HR (100%) 163 bpm    EF(MOD-bp) 41.0 %    LVIDd 6.1 cm    LVIDs 4.6 cm    IVSd 0.78 cm    LVPWd 0.78 cm    FS 24.5 %    IVS/LVPW 1.00 cm    ESV(cubed) 97.7 ml    EDV(cubed) 226.6 ml    LVOT area 3.2 cm2    LV mass(C)d 185.9 grams    LVOT diam 2.03 cm    EDV(MOD-sp2) 180.0 ml    EDV(MOD-sp4) 184.0 ml    ESV(MOD-sp2) 100.0 ml    ESV(MOD-sp4) 113.0 ml    SV(MOD-sp2) 80.0 ml    SV(MOD-sp4) 71.0 ml    EF(MOD-sp2) 44.4 %    EF(MOD-sp4) 38.6 %    MV E max bishop 64.7 cm/sec    MV A max bishop 96.0 cm/sec    MV dec time 0.29 msec    MV E/A 0.67     LA ESV Index (BP) 33.2 ml/m2    Med Peak E' Bishop 4.1 cm/sec    Lat Peak E' Bishop 4.1 cm/sec    Avg E/e' ratio 15.78     SV(LVOT) 69.8 ml    RV Base 3.6 cm    RV Mid 2.5 cm    RV Length 6.4 cm    TAPSE (>1.6) 2.6 cm    RV S' 13.3 cm/sec    LA dimension (2D)  4.7 cm    LV V1 max 110.1 cm/sec    LV V1 max PG 4.9 mmHg    LV V1 mean PG 2.9 mmHg    LV V1 VTI 21.7 cm    Ao pk bishop 185.4 cm/sec    Ao max PG 13.8 mmHg    Ao mean PG 7.0 mmHg    Ao V2 VTI 36.8 cm    CHRISTIE(I,D) 1.90 cm2    AI P1/2t 792.2 msec    MV max PG 4.1 mmHg    MV mean PG 2.10 mmHg    MV V2 VTI 26.7 cm    MV P1/2t 100.0 msec    MVA(P1/2t) 2.20 cm2     MVA(VTI) 2.6 cm2    MV dec slope 239.1 cm/sec2    PA V2 max 96.6 cm/sec    PA acc time 0.10 sec    PA pr(Accel) 36.2 mmHg    Ao root diam 3.0 cm    BH CV VAS BP RIGHT /85 mmHg       PE    Physical Exam  Vitals reviewed.   Constitutional:       General: She is not in acute distress.     Appearance: Normal appearance. She is well-developed and normal weight. She is not ill-appearing or diaphoretic.   HENT:      Head: Normocephalic and atraumatic.   Eyes:      Extraocular Movements: Extraocular movements intact.      Conjunctiva/sclera: Conjunctivae normal.   Pulmonary:      Effort: No respiratory distress.   Musculoskeletal:         General: Normal range of motion.      Cervical back: Normal range of motion.   Neurological:      General: No focal deficit present.      Mental Status: She is alert.   Psychiatric:         Attention and Perception: She is attentive.         Mood and Affect: Mood normal.         Speech: Speech normal.         Behavior: Behavior normal. Behavior is cooperative.         Thought Content: Thought content normal.         Judgment: Judgment normal.         A/P    Diagnoses and all orders for this visit:    1. Neuropathy (Primary)  -     gabapentin (NEURONTIN) 300 MG capsule; Take 1 capsule by mouth Every Night.  Dispense: 30 capsule; Refill: 2  Patient is taking gabapentin 300 mg nightly which is helping with her neuropathy.  She denies any adverse effects.  UDS, CSC and Neil are all up-to-date.  Return in 3 months.    2. Primary hypertension  Borderline today.  Monitors at home, usually normal.  Compliant on medications.  Asymptomatic for hypertension.    3. Tobacco use  -     nicotine (Nicoderm CQ) 14 MG/24HR patch; Place 1 patch on the skin as directed by provider Daily.  Dispense: 28 each; Refill: 2  Patient has not been smoking since September.  She smoked for 40+ years.  We will reduce her NicoDerm patch dose to 14 mg daily for the next 3 months.  We will plan on reducing her  patch dose at her next follow-up.         Plan of care reviewed with patient at the conclusion of today's visit. Education was provided regarding diagnosis, management and any prescribed or recommended OTC medications.  Patient verbalizes understanding of and agreement with management plan.    Dictated Utilizing Dragon Dictation     Please note that portions of this note were completed with a voice recognition program.     Part of this note may be an electronic transcription/translation of spoken language to printed text using the Dragon Dictation System.    Return in about 3 months (around 5/15/2023) for Recheck, CM.     Sandee Keen PA-C  Answers for HPI/ROS submitted by the patient on 2/13/2023  Please describe your symptoms.: Follow up  Have you had these symptoms before?: No  How long have you been having these symptoms?: 1-4 days  What is the primary reason for your visit?: Other

## 2023-02-22 DIAGNOSIS — I50.22 CHRONIC SYSTOLIC HEART FAILURE: ICD-10-CM

## 2023-02-22 RX ORDER — CARVEDILOL 12.5 MG/1
18.75 TABLET ORAL 2 TIMES DAILY WITH MEALS
Qty: 90 TABLET | Refills: 5 | Status: SHIPPED | OUTPATIENT
Start: 2023-02-22 | End: 2023-08-21

## 2023-02-28 RX ORDER — VALSARTAN 40 MG/1
TABLET ORAL
Qty: 90 TABLET | Refills: 2 | Status: SHIPPED | OUTPATIENT
Start: 2023-02-28

## 2023-04-10 DIAGNOSIS — R73.03 PREDIABETES: ICD-10-CM

## 2023-04-10 DIAGNOSIS — I50.22 CHRONIC SYSTOLIC HEART FAILURE: ICD-10-CM

## 2023-04-10 RX ORDER — EMPAGLIFLOZIN 10 MG/1
TABLET, FILM COATED ORAL
Qty: 90 TABLET | Refills: 1 | Status: SHIPPED | OUTPATIENT
Start: 2023-04-10

## 2023-04-10 NOTE — TELEPHONE ENCOUNTER
Rx Refill Note  Requested Prescriptions     Pending Prescriptions Disp Refills   • Jardiance 10 MG tablet tablet [Pharmacy Med Name: JARDIANCE 10 MG TABLET] 90 tablet 1     Sig: TAKE 1 TABLET BY MOUTH EVERY DAY      Last office visit with prescribing clinician: 2/15/2023   Last telemedicine visit with prescribing clinician: 5/16/2023   Next office visit with prescribing clinician: 5/16/2023                         Would you like a call back once the refill request has been completed: [] Yes [] No    If the office needs to give you a call back, can they leave a voicemail: [] Yes [] No    Gael Musa MA  04/10/23, 08:34 EDT

## 2023-05-16 ENCOUNTER — OFFICE VISIT (OUTPATIENT)
Dept: FAMILY MEDICINE CLINIC | Facility: CLINIC | Age: 58
End: 2023-05-16
Payer: COMMERCIAL

## 2023-05-16 VITALS
DIASTOLIC BLOOD PRESSURE: 82 MMHG | WEIGHT: 176.6 LBS | OXYGEN SATURATION: 98 % | BODY MASS INDEX: 29.39 KG/M2 | SYSTOLIC BLOOD PRESSURE: 126 MMHG | HEART RATE: 66 BPM | TEMPERATURE: 96.6 F

## 2023-05-16 DIAGNOSIS — I10 PRIMARY HYPERTENSION: ICD-10-CM

## 2023-05-16 DIAGNOSIS — Z72.0 TOBACCO USE: ICD-10-CM

## 2023-05-16 DIAGNOSIS — G62.9 NEUROPATHY: Primary | ICD-10-CM

## 2023-05-16 DIAGNOSIS — R10.816 EPIGASTRIC ABDOMINAL TENDERNESS WITHOUT REBOUND TENDERNESS: ICD-10-CM

## 2023-05-16 PROCEDURE — 99214 OFFICE O/P EST MOD 30 MIN: CPT | Performed by: PHYSICIAN ASSISTANT

## 2023-05-16 RX ORDER — GABAPENTIN 300 MG/1
300 CAPSULE ORAL NIGHTLY
Qty: 30 CAPSULE | Refills: 2 | Status: SHIPPED | OUTPATIENT
Start: 2023-05-16

## 2023-05-16 RX ORDER — FAMOTIDINE 20 MG/1
20 TABLET, FILM COATED ORAL 2 TIMES DAILY
Qty: 60 TABLET | Refills: 0 | Status: SHIPPED | OUTPATIENT
Start: 2023-05-16

## 2023-05-16 NOTE — PROGRESS NOTES
Chief Complaint   Patient presents with   • Peripheral Neuropathy     3 month follow up        Nae Sanchez is a pleasant 57 y.o. female who is here for routine follow-up of peripheral neuropathy.  Patient is currently taking gabapentin 300 mg nightly.  She reports this works well to help with her pain.  She denies any adverse effects with the medication.  Her blood pressure is stable and well-controlled.  She is currently on the NicoDerm patch 14 mg once daily.  She has been on the same dose since August 2022.  She is not smoking and is doing well on the patch.  She reports that she is breathing better since quitting smoking.  She reports new onset upper abdominal fullness.  She denies pain, nausea, fever, change in stools, blood in stools.  She does report episodic reflux approximately once a week.  Not currently taking any medicine for this.    Past Medical History:   Diagnosis Date   • ADHD (attention deficit hyperactivity disorder) 2019   • Anxiety    • CHF (congestive heart failure) 08/29/2022   • Coronary artery disease 08/29/2022   • Depression    • Heart murmur 1965   • Hypertension 08/29/2022   • Myocardial infarction 08/29/2022   • Osteopenia    • Peptic ulceration 2019   • Renal insufficiency    • Sleep apnea  noticed       Past Surgical History:   Procedure Laterality Date   • CARDIAC CATHETERIZATION N/A 09/01/2022    Procedure: Left Heart Cath;  Surgeon: Greta Sanchez MD;  Location:  [email or web address];  Service: Cardiology;  Laterality: N/A;   • COLONOSCOPY  2019   • COLONOSCOPY N/A 1/10/2023    Procedure: COLONOSCOPY;  Surgeon: Rebel De Paz MD;  Location:  [facility];  Service: Gastroenterology;  Laterality: N/A;   • FOOT SURGERY Left    • FRACTURE SURGERY  05/25/21    Left foot   • TUBAL ABDOMINAL LIGATION  09/21/97       Family History   Problem Relation Age of Onset   • Anxiety disorder Mother    • Hypertension Father    • Diabetes Father    • No Known  Problems Sister    • Heart disease Brother    • Early death Brother    • Mental illness Daughter    • Breast cancer Neg Hx        Social History     Socioeconomic History   • Marital status:    Tobacco Use   • Smoking status: Former     Packs/day: 2.00     Years: 40.00     Pack years: 80.00     Types: Cigarettes     Start date: 1982     Quit date: 2022     Years since quittin.7     Passive exposure: Never   • Smokeless tobacco: Former     Quit date: 2022   Vaping Use   • Vaping Use: Never used   Substance and Sexual Activity   • Alcohol use: No   • Drug use: No   • Sexual activity: Not Currently     Partners: Male     Birth control/protection: Tubal ligation       No Known Allergies    ROS  Review of Systems   Constitutional: Positive for fatigue. Negative for chills, diaphoresis and fever.   HENT: Negative for congestion, postnasal drip and rhinorrhea.    Respiratory: Negative for cough, shortness of breath and wheezing.    Cardiovascular: Negative for chest pain and leg swelling.   Gastrointestinal: Positive for abdominal distention and indigestion. Negative for abdominal pain, blood in stool, constipation, diarrhea, nausea, vomiting and GERD.   Neurological: Negative for dizziness and headache.   Psychiatric/Behavioral: Negative for stress.       Vitals:    23 1437   BP: 126/82   BP Location: Left arm   Patient Position: Sitting   Cuff Size: Adult   Pulse: 66   Temp: 96.6 °F (35.9 °C)   TempSrc: Infrared   SpO2: 98%   Weight: 80.1 kg (176 lb 9.6 oz)     Body mass index is 29.39 kg/m².    Current Outpatient Medications on File Prior to Visit   Medication Sig Dispense Refill   • aspirin 81 MG EC tablet Take 1 tablet by mouth Daily. 30 tablet 6   • Calcium Carbonate-Vitamin D (Oyster Shell Calcium/D) 500-5 MG-MCG tablet Take 1 tablet by mouth Daily.     • carvedilol (COREG) 12.5 MG tablet Take 1.5 tablets by mouth 2 (Two) Times a Day With Meals for 360 doses. 90 tablet 5   •  desvenlafaxine (PRISTIQ) 100 MG 24 hr tablet Take 1 tablet by mouth Every Morning.     • folic acid (FOLVITE) 1 MG tablet TAKE 1 TABLET BY MOUTH EVERY DAY 90 tablet 0   • Jardiance 10 MG tablet tablet TAKE 1 TABLET BY MOUTH EVERY DAY 90 tablet 1   • lisdexamfetamine (VYVANSE) 60 MG capsule Take 1 capsule by mouth Every Morning     • valsartan (DIOVAN) 40 MG tablet TAKE 1 TABLET BY MOUTH EVERY DAY 90 tablet 2   • [DISCONTINUED] gabapentin (NEURONTIN) 300 MG capsule Take 1 capsule by mouth Every Night. 30 capsule 2   • [DISCONTINUED] nicotine (Nicoderm CQ) 14 MG/24HR patch Place 1 patch on the skin as directed by provider Daily. 28 each 2   • [DISCONTINUED] torsemide (DEMADEX) 10 MG tablet Take 0.5 tablets by mouth Daily As Needed (weight gain of 3lb or increased LE swelling). 30 tablet 6     No current facility-administered medications on file prior to visit.       Results for orders placed or performed during the hospital encounter of 12/06/22   Adult Transthoracic Echo Complete W/ Cont if Necessary Per Protocol   Result Value Ref Range    Target HR (85%) 139 bpm    Max. Pred. HR (100%) 163 bpm    EF(MOD-bp) 41.0 %    LVIDd 6.1 cm    LVIDs 4.6 cm    IVSd 0.78 cm    LVPWd 0.78 cm    FS 24.5 %    IVS/LVPW 1.00 cm    ESV(cubed) 97.7 ml    EDV(cubed) 226.6 ml    LVOT area 3.2 cm2    LV mass(C)d 185.9 grams    LVOT diam 2.03 cm    EDV(MOD-sp2) 180.0 ml    EDV(MOD-sp4) 184.0 ml    ESV(MOD-sp2) 100.0 ml    ESV(MOD-sp4) 113.0 ml    SV(MOD-sp2) 80.0 ml    SV(MOD-sp4) 71.0 ml    EF(MOD-sp2) 44.4 %    EF(MOD-sp4) 38.6 %    MV E max bishop 64.7 cm/sec    MV A max bishop 96.0 cm/sec    MV dec time 0.29 msec    MV E/A 0.67     LA ESV Index (BP) 33.2 ml/m2    Med Peak E' Bishop 4.1 cm/sec    Lat Peak E' Bishop 4.1 cm/sec    Avg E/e' ratio 15.78     SV(LVOT) 69.8 ml    RV Base 3.6 cm    RV Mid 2.5 cm    RV Length 6.4 cm    TAPSE (>1.6) 2.6 cm    RV S' 13.3 cm/sec    LA dimension (2D)  4.7 cm    LV V1 max 110.1 cm/sec    LV V1 max PG 4.9 mmHg     LV V1 mean PG 2.9 mmHg    LV V1 VTI 21.7 cm    Ao pk roxie 185.4 cm/sec    Ao max PG 13.8 mmHg    Ao mean PG 7.0 mmHg    Ao V2 VTI 36.8 cm    CHRISTIE(I,D) 1.90 cm2    AI P1/2t 792.2 msec    MV max PG 4.1 mmHg    MV mean PG 2.10 mmHg    MV V2 VTI 26.7 cm    MV P1/2t 100.0 msec    MVA(P1/2t) 2.20 cm2    MVA(VTI) 2.6 cm2    MV dec slope 239.1 cm/sec2    PA V2 max 96.6 cm/sec    PA acc time 0.10 sec    PA pr(Accel) 36.2 mmHg    Ao root diam 3.0 cm    BH CV VAS BP RIGHT /85 mmHg       PE    Physical Exam  Vitals reviewed.   Constitutional:       General: She is not in acute distress.     Appearance: Normal appearance. She is well-developed and normal weight. She is not ill-appearing or diaphoretic.   HENT:      Head: Normocephalic and atraumatic.   Eyes:      Extraocular Movements: Extraocular movements intact.      Conjunctiva/sclera: Conjunctivae normal.   Cardiovascular:      Rate and Rhythm: Normal rate and regular rhythm.      Heart sounds: Normal heart sounds.   Pulmonary:      Effort: No respiratory distress.   Abdominal:      General: Abdomen is flat.      Palpations: Abdomen is soft.      Tenderness: There is abdominal tenderness in the right upper quadrant and epigastric area. There is no guarding or rebound. Negative signs include Lewis's sign.   Musculoskeletal:         General: Normal range of motion.      Cervical back: Normal range of motion.      Right lower leg: No edema.      Left lower leg: No edema.   Skin:     General: Skin is warm.      Findings: No erythema or rash.   Neurological:      General: No focal deficit present.      Mental Status: She is alert.   Psychiatric:         Attention and Perception: She is attentive.         Mood and Affect: Mood normal.         Speech: Speech normal.         Behavior: Behavior normal. Behavior is cooperative.         Thought Content: Thought content normal.         Judgment: Judgment normal.         A/P    Diagnoses and all orders for this visit:    1.  Neuropathy (Primary)  -     gabapentin (NEURONTIN) 300 MG capsule; Take 1 capsule by mouth Every Night.  Dispense: 30 capsule; Refill: 2  Taking gabapentin 300 mg nightly.  This works well to help with neuropathic pain.  Denies any adverse effects.  UDS, CSC up-to-date.  Return in 3 months.    2. Primary hypertension  Stable, well-controlled.  Compliant on medication.    3. Tobacco use  -     nicotine (Nicoderm CQ) 7 MG/24HR patch; Place 1 patch on the skin as directed by provider Daily.  Dispense: 28 each; Refill: 2  Quit smoking in August 2022.  Still using Nicoderm patches.  Will reduce to 7 mg daily.  Will plan on discontinuing patches in 3 months.    4. Epigastric abdominal tenderness without rebound tenderness  -     famotidine (Pepcid) 20 MG tablet; Take 1 tablet by mouth 2 (Two) Times a Day.  Dispense: 60 tablet; Refill: 0  TTP along epigastric region with fullness reported.  No other symptoms.  Discussed symptoms to watch for.  Will trial Pepcid 20 mg twice daily.  Return in 1 month.       Plan of care reviewed with patient at the conclusion of today's visit. Education was provided regarding diagnosis, management and any prescribed or recommended OTC medications.  Patient verbalizes understanding of and agreement with management plan.    Dictated Utilizing Dragon Dictation     Please note that portions of this note were completed with a voice recognition program.     Part of this note may be an electronic transcription/translation of spoken language to printed text using the Dragon Dictation System.    Return in about 4 weeks (around 6/13/2023) for Annual physical.     Sandee Keen PA-C  Answers for HPI/ROS submitted by the patient on 5/15/2023  Please describe your symptoms.: Check up  Have you had these symptoms before?: No  How long have you been having these symptoms?: Greater than 2 weeks  What is the primary reason for your visit?: Other

## 2023-05-18 DIAGNOSIS — E53.8 FOLATE DEFICIENCY: ICD-10-CM

## 2023-05-18 RX ORDER — TORSEMIDE 5 MG/1
10 TABLET ORAL DAILY PRN
Qty: 30 TABLET | Refills: 1 | Status: SHIPPED | OUTPATIENT
Start: 2023-05-18

## 2023-05-18 RX ORDER — FOLIC ACID 1 MG/1
TABLET ORAL
Qty: 90 TABLET | Refills: 0 | Status: SHIPPED | OUTPATIENT
Start: 2023-05-18

## 2023-06-07 RX ORDER — TORSEMIDE 5 MG/1
5 TABLET ORAL DAILY PRN
Qty: 30 TABLET | Refills: 1 | Status: SHIPPED | OUTPATIENT
Start: 2023-06-07

## 2023-06-08 DIAGNOSIS — R10.816 EPIGASTRIC ABDOMINAL TENDERNESS WITHOUT REBOUND TENDERNESS: ICD-10-CM

## 2023-06-08 RX ORDER — FAMOTIDINE 20 MG/1
TABLET, FILM COATED ORAL
Qty: 60 TABLET | Refills: 0 | Status: SHIPPED | OUTPATIENT
Start: 2023-06-08

## 2023-06-09 ENCOUNTER — TELEPHONE (OUTPATIENT)
Dept: CARDIOLOGY | Facility: CLINIC | Age: 58
End: 2023-06-09
Payer: COMMERCIAL

## 2023-06-09 NOTE — TELEPHONE ENCOUNTER
Pt called back, original prescription torsemide 10 mg tablets daily PRN. Confirmed with patient that correct dose should be 5 mg daily PRN. Pt verbalizes understanding and agreeable to plan.      New prescription sent in reflecting correct dose.

## 2023-06-16 ENCOUNTER — OFFICE VISIT (OUTPATIENT)
Dept: FAMILY MEDICINE CLINIC | Facility: CLINIC | Age: 58
End: 2023-06-16
Payer: COMMERCIAL

## 2023-06-16 ENCOUNTER — LAB (OUTPATIENT)
Dept: LAB | Facility: HOSPITAL | Age: 58
End: 2023-06-16
Payer: COMMERCIAL

## 2023-06-16 VITALS
HEART RATE: 72 BPM | BODY MASS INDEX: 29.62 KG/M2 | SYSTOLIC BLOOD PRESSURE: 122 MMHG | DIASTOLIC BLOOD PRESSURE: 82 MMHG | OXYGEN SATURATION: 93 % | WEIGHT: 177.8 LBS | HEIGHT: 65 IN

## 2023-06-16 DIAGNOSIS — Z12.2 ENCOUNTER FOR SCREENING FOR LUNG CANCER: ICD-10-CM

## 2023-06-16 DIAGNOSIS — I10 PRIMARY HYPERTENSION: ICD-10-CM

## 2023-06-16 DIAGNOSIS — F41.9 ANXIETY: ICD-10-CM

## 2023-06-16 DIAGNOSIS — R73.03 PREDIABETES: ICD-10-CM

## 2023-06-16 DIAGNOSIS — Z13.29 SCREENING FOR THYROID DISORDER: ICD-10-CM

## 2023-06-16 DIAGNOSIS — Z12.31 ENCOUNTER FOR SCREENING MAMMOGRAM FOR MALIGNANT NEOPLASM OF BREAST: ICD-10-CM

## 2023-06-16 DIAGNOSIS — Z00.00 PHYSICAL EXAM, ANNUAL: Primary | ICD-10-CM

## 2023-06-16 DIAGNOSIS — F17.210 CIGARETTE SMOKER: ICD-10-CM

## 2023-06-16 DIAGNOSIS — G62.9 NEUROPATHY: ICD-10-CM

## 2023-06-16 DIAGNOSIS — I42.8 NICM (NONISCHEMIC CARDIOMYOPATHY): ICD-10-CM

## 2023-06-16 DIAGNOSIS — N28.9 RENAL INSUFFICIENCY: ICD-10-CM

## 2023-06-16 DIAGNOSIS — Z23 IMMUNIZATION DUE: ICD-10-CM

## 2023-06-16 DIAGNOSIS — Z13.1 SCREENING FOR DIABETES MELLITUS: ICD-10-CM

## 2023-06-16 DIAGNOSIS — F98.8 ATTENTION DEFICIT DISORDER (ADD) WITHOUT HYPERACTIVITY: ICD-10-CM

## 2023-06-16 DIAGNOSIS — Z11.59 ENCOUNTER FOR HEPATITIS C SCREENING TEST FOR LOW RISK PATIENT: ICD-10-CM

## 2023-06-16 DIAGNOSIS — I50.22 CHRONIC SYSTOLIC HEART FAILURE: ICD-10-CM

## 2023-06-16 DIAGNOSIS — F33.0 MILD EPISODE OF RECURRENT MAJOR DEPRESSIVE DISORDER: ICD-10-CM

## 2023-06-16 DIAGNOSIS — Z13.0 SCREENING FOR DEFICIENCY ANEMIA: ICD-10-CM

## 2023-06-16 DIAGNOSIS — R10.11 RUQ PAIN: ICD-10-CM

## 2023-06-16 DIAGNOSIS — Z13.220 SCREENING FOR CHOLESTEROL LEVEL: ICD-10-CM

## 2023-06-16 LAB
ALBUMIN SERPL-MCNC: 4.8 G/DL (ref 3.5–5.2)
ALBUMIN/GLOB SERPL: 1.9 G/DL
ALP SERPL-CCNC: 105 U/L (ref 39–117)
ALT SERPL W P-5'-P-CCNC: 14 U/L (ref 1–33)
ANION GAP SERPL CALCULATED.3IONS-SCNC: 13.5 MMOL/L (ref 5–15)
AST SERPL-CCNC: 28 U/L (ref 1–32)
BILIRUB SERPL-MCNC: 0.5 MG/DL (ref 0–1.2)
BUN SERPL-MCNC: 31 MG/DL (ref 6–20)
BUN/CREAT SERPL: 14.2 (ref 7–25)
CALCIUM SPEC-SCNC: 9.5 MG/DL (ref 8.6–10.5)
CHLORIDE SERPL-SCNC: 101 MMOL/L (ref 98–107)
CHOLEST SERPL-MCNC: 231 MG/DL (ref 0–200)
CO2 SERPL-SCNC: 25.5 MMOL/L (ref 22–29)
CREAT SERPL-MCNC: 2.18 MG/DL (ref 0.57–1)
DEPRECATED RDW RBC AUTO: 51.4 FL (ref 37–54)
EGFRCR SERPLBLD CKD-EPI 2021: 25.7 ML/MIN/1.73
ERYTHROCYTE [DISTWIDTH] IN BLOOD BY AUTOMATED COUNT: 17 % (ref 12.3–15.4)
GLOBULIN UR ELPH-MCNC: 2.5 GM/DL
GLUCOSE SERPL-MCNC: 95 MG/DL (ref 65–99)
HCT VFR BLD AUTO: 44 % (ref 34–46.6)
HCV AB SER DONR QL: NORMAL
HDLC SERPL-MCNC: 57 MG/DL (ref 40–60)
HGB BLD-MCNC: 14.6 G/DL (ref 12–15.9)
LDLC SERPL CALC-MCNC: 161 MG/DL (ref 0–100)
LDLC/HDLC SERPL: 2.79 {RATIO}
MCH RBC QN AUTO: 27.9 PG (ref 26.6–33)
MCHC RBC AUTO-ENTMCNC: 33.2 G/DL (ref 31.5–35.7)
MCV RBC AUTO: 84.1 FL (ref 79–97)
PLATELET # BLD AUTO: 633 10*3/MM3 (ref 140–450)
PMV BLD AUTO: 10.8 FL (ref 6–12)
POTASSIUM SERPL-SCNC: 4.6 MMOL/L (ref 3.5–5.2)
PROT SERPL-MCNC: 7.3 G/DL (ref 6–8.5)
RBC # BLD AUTO: 5.23 10*6/MM3 (ref 3.77–5.28)
SODIUM SERPL-SCNC: 140 MMOL/L (ref 136–145)
TRIGL SERPL-MCNC: 74 MG/DL (ref 0–150)
TSH SERPL DL<=0.05 MIU/L-ACNC: 2.88 UIU/ML (ref 0.27–4.2)
VLDLC SERPL-MCNC: 13 MG/DL (ref 5–40)
WBC NRBC COR # BLD: 13.44 10*3/MM3 (ref 3.4–10.8)

## 2023-06-16 PROCEDURE — 80050 GENERAL HEALTH PANEL: CPT | Performed by: PHYSICIAN ASSISTANT

## 2023-06-16 PROCEDURE — 83036 HEMOGLOBIN GLYCOSYLATED A1C: CPT | Performed by: PHYSICIAN ASSISTANT

## 2023-06-16 PROCEDURE — 86803 HEPATITIS C AB TEST: CPT | Performed by: PHYSICIAN ASSISTANT

## 2023-06-16 PROCEDURE — 80061 LIPID PANEL: CPT | Performed by: PHYSICIAN ASSISTANT

## 2023-06-16 RX ORDER — LISDEXAMFETAMINE DIMESYLATE 50 MG
50 CAPSULE ORAL EVERY MORNING
COMMUNITY
Start: 2023-06-01

## 2023-06-16 NOTE — LETTER
"VACCINE CONSENT FORM      Patient Name:  Nae Sanchez    Patient :  1965      I/We have read, or have been explained, the information about the diseases and the vaccines listed below.  There was an opportunity to ask questions and any questions were answered satisfactorily.  I/We believe that I/we understand the benefits and risks of the vaccines(s) cited, and ask the vaccine(s) listed below be given to me/us or the person named above (for which i have authorized to make the request).      Vaccine(s) give:    Orders Placed This Encounter   Procedures   • Tdap Vaccine Greater Than or Equal To 8yo IM   • Pneumococcal Conjugate Vaccine 20-Valent All         Medicare patients:    The only vaccine covered under your medical benefit is flu/pneumonia and hepatitis B.  All other may be covered under your \"Part D\" prescription plan and requires you to go to a pharmacy with a Physician orders for administration.  If you still prefer to have it administered at our office, you will receive a bill for the vaccine and administration cost.               Patient Initials                     Patient or Parent/Guardian Signature                    Date        A copy of the appropriate Centers for Disease Control and Prevention Vaccine Information Statements has been provided.   "

## 2023-06-16 NOTE — PROGRESS NOTES
Chief Complaint   Patient presents with    Annual Exam       Nae Sanchez is a pleasant 58 y.o. female who is here for annual physical exam.  Patient presents for management of her prediabetes, neuropathy, renal insufficiency, depression, anxiety, ADD, hypertension, cardiomyopathy and heart failure.  Patient is established with cardiology and nephrology.  Her blood pressure is stable well-controlled today.  She is compliant on all medications.  She is taking gabapentin 300 mg nightly and this helps with her neuropathy.  She quit smoking a year ago.  She smoked 2 packs a day for 40 years.  She is also established with a psychiatrist who manages her medications.  She reports stable mood and anxiety with the current medications she is taking.  These also help her with her attention and focus.  Overall patient is doing well today.  She has dentures and does not go to the dentist.  Encouraged her to examine her mouth for cancer monthly.  She is due for mammogram in November.  She is up-to-date with her colonoscopy.  She is up-to-date with her bone scan.  It is unclear when she had her last Pap smear.  She states it was normal at that time.    Past Medical History:   Diagnosis Date    ADHD (attention deficit hyperactivity disorder)     Anxiety     CHF (congestive heart failure) 2022    Coronary artery disease 2022    Depression     Diverticulosis     Heart murmur 1965    Hypertension 2022    Myocardial infarction 2022    Osteopenia     Peptic ulceration     Renal insufficiency     Sleep apnea  noticed       Past Surgical History:   Procedure Laterality Date    CARDIAC CATHETERIZATION N/A 2022    Procedure: Left Heart Cath;  Surgeon: Greta Sanchez MD;  Location: [facility] CATH INVASIVE LOCATION;  Service: Cardiology;  Laterality: N/A;     SECTION  1997    COLONOSCOPY  2019    COLONOSCOPY N/A 01/10/2023    Procedure: COLONOSCOPY;  Surgeon: Zandra  Rebel NICHOLS MD;  Location: [facility] ENDOSCOPY;  Service: Gastroenterology;  Laterality: N/A;    FOOT SURGERY Left     FRACTURE SURGERY  21    Left foot    TUBAL ABDOMINAL LIGATION  97       Family History   Problem Relation Age of Onset    Anxiety disorder Mother     Hypertension Father     Diabetes Father     No Known Problems Sister     Heart disease Brother     Early death Brother     Mental illness Daughter     Breast cancer Neg Hx        Social History     Socioeconomic History    Marital status:    Tobacco Use    Smoking status: Former     Packs/day: 2.00     Years: 40.00     Pack years: 80.00     Types: Cigarettes     Start date: 1982     Quit date: 2022     Years since quittin.7     Passive exposure: Never    Smokeless tobacco: Former     Quit date: 2022   Vaping Use    Vaping Use: Never used   Substance and Sexual Activity    Alcohol use: No    Drug use: No    Sexual activity: Not Currently     Partners: Male     Birth control/protection: Tubal ligation       No Known Allergies    ROS  Review of Systems   Constitutional:  Negative for chills, diaphoresis, fatigue and fever.   HENT:  Negative for congestion, ear pain, hearing loss, postnasal drip, rhinorrhea and sore throat.    Eyes:  Negative for blurred vision and pain.   Respiratory:  Negative for cough, shortness of breath and wheezing.    Cardiovascular:  Negative for chest pain and leg swelling.   Gastrointestinal:  Negative for abdominal pain, blood in stool, constipation, diarrhea, nausea, vomiting and indigestion.   Endocrine: Negative for polyuria.   Genitourinary:  Negative for dysuria, flank pain and hematuria.   Musculoskeletal:  Negative for arthralgias, gait problem and myalgias.   Skin:  Negative for rash and skin lesions.   Neurological:  Negative for dizziness and headache.   Psychiatric/Behavioral:  Positive for stress. Negative for self-injury, sleep disturbance, suicidal ideas and depressed mood. The  "patient is not nervous/anxious.      Vitals:    06/16/23 1022   BP: 122/82   Pulse: 72   SpO2: 93%   Weight: 80.6 kg (177 lb 12.8 oz)   Height: 165.1 cm (65\")     Body mass index is 29.59 kg/m².    BMI is >= 25 and <30. (Overweight) The following options were offered after discussion;: exercise counseling/recommendations and nutrition counseling/recommendations       Current Outpatient Medications on File Prior to Visit   Medication Sig Dispense Refill    aspirin 81 MG EC tablet Take 1 tablet by mouth Daily. 30 tablet 6    Calcium Carbonate-Vitamin D (Oyster Shell Calcium/D) 500-5 MG-MCG tablet Take 1 tablet by mouth Daily.      carvedilol (COREG) 12.5 MG tablet Take 1.5 tablets by mouth 2 (Two) Times a Day With Meals for 360 doses. 90 tablet 5    desvenlafaxine (PRISTIQ) 100 MG 24 hr tablet Take 1 tablet by mouth Every Morning.      famotidine (PEPCID) 20 MG tablet TAKE 1 TABLET BY MOUTH TWICE A DAY 60 tablet 0    folic acid (FOLVITE) 1 MG tablet TAKE 1 TABLET BY MOUTH EVERY DAY 90 tablet 0    gabapentin (NEURONTIN) 300 MG capsule Take 1 capsule by mouth Every Night. 30 capsule 2    Jardiance 10 MG tablet tablet TAKE 1 TABLET BY MOUTH EVERY DAY 90 tablet 1    torsemide (DEMADEX) 5 MG tablet Take 1 tablet by mouth Daily As Needed (weight gain of 3lb or increased LE swelling). 30 tablet 1    valsartan (DIOVAN) 40 MG tablet TAKE 1 TABLET BY MOUTH EVERY DAY 90 tablet 2    Vyvanse 50 MG capsule Take 1 capsule by mouth Every Morning      [DISCONTINUED] lisdexamfetamine (VYVANSE) 60 MG capsule Take 1 capsule by mouth Every Morning      [DISCONTINUED] nicotine (Nicoderm CQ) 7 MG/24HR patch Place 1 patch on the skin as directed by provider Daily. 28 each 2     No current facility-administered medications on file prior to visit.       Results for orders placed or performed during the hospital encounter of 12/06/22   Adult Transthoracic Echo Complete W/ Cont if Necessary Per Protocol   Result Value Ref Range    Target HR " (85%) 139 bpm    Max. Pred. HR (100%) 163 bpm    EF(MOD-bp) 41.0 %    LVIDd 6.1 cm    LVIDs 4.6 cm    IVSd 0.78 cm    LVPWd 0.78 cm    FS 24.5 %    IVS/LVPW 1.00 cm    ESV(cubed) 97.7 ml    EDV(cubed) 226.6 ml    LVOT area 3.2 cm2    LV mass(C)d 185.9 grams    LVOT diam 2.03 cm    EDV(MOD-sp2) 180.0 ml    EDV(MOD-sp4) 184.0 ml    ESV(MOD-sp2) 100.0 ml    ESV(MOD-sp4) 113.0 ml    SV(MOD-sp2) 80.0 ml    SV(MOD-sp4) 71.0 ml    EF(MOD-sp2) 44.4 %    EF(MOD-sp4) 38.6 %    MV E max bishop 64.7 cm/sec    MV A max bishop 96.0 cm/sec    MV dec time 0.29 msec    MV E/A 0.67     LA ESV Index (BP) 33.2 ml/m2    Med Peak E' Bishop 4.1 cm/sec    Lat Peak E' Bishop 4.1 cm/sec    Avg E/e' ratio 15.78     SV(LVOT) 69.8 ml    RV Base 3.6 cm    RV Mid 2.5 cm    RV Length 6.4 cm    TAPSE (>1.6) 2.6 cm    RV S' 13.3 cm/sec    LA dimension (2D)  4.7 cm    LV V1 max 110.1 cm/sec    LV V1 max PG 4.9 mmHg    LV V1 mean PG 2.9 mmHg    LV V1 VTI 21.7 cm    Ao pk bishop 185.4 cm/sec    Ao max PG 13.8 mmHg    Ao mean PG 7.0 mmHg    Ao V2 VTI 36.8 cm    CHRISTIE(I,D) 1.90 cm2    AI P1/2t 792.2 msec    MV max PG 4.1 mmHg    MV mean PG 2.10 mmHg    MV V2 VTI 26.7 cm    MV P1/2t 100.0 msec    MVA(P1/2t) 2.20 cm2    MVA(VTI) 2.6 cm2    MV dec slope 239.1 cm/sec2    PA V2 max 96.6 cm/sec    PA acc time 0.10 sec    PA pr(Accel) 36.2 mmHg    Ao root diam 3.0 cm    BH CV VAS BP RIGHT /85 mmHg       PE    Physical Exam  Vitals reviewed.   Constitutional:       General: She is not in acute distress.     Appearance: Normal appearance. She is well-developed and normal weight. She is not ill-appearing or diaphoretic.   HENT:      Head: Normocephalic and atraumatic.      Right Ear: Hearing, tympanic membrane, ear canal and external ear normal.      Left Ear: Hearing, tympanic membrane, ear canal and external ear normal.      Nose: Nose normal.      Right Sinus: No maxillary sinus tenderness or frontal sinus tenderness.      Left Sinus: No maxillary sinus tenderness or  frontal sinus tenderness.      Mouth/Throat:      Pharynx: Uvula midline.   Eyes:      General: Lids are normal.      Extraocular Movements: Extraocular movements intact.      Conjunctiva/sclera: Conjunctivae normal.   Neck:      Thyroid: No thyroid mass or thyromegaly.      Trachea: Trachea and phonation normal.   Cardiovascular:      Rate and Rhythm: Normal rate and regular rhythm.      Heart sounds: Normal heart sounds.   Pulmonary:      Effort: Pulmonary effort is normal.      Breath sounds: Normal breath sounds.   Abdominal:      General: Bowel sounds are normal. There is no distension.      Palpations: Abdomen is soft. Abdomen is not rigid.      Tenderness: There is abdominal tenderness in the right upper quadrant. There is no guarding.   Musculoskeletal:         General: Normal range of motion.      Cervical back: Normal range of motion.      Right lower leg: No edema.      Left lower leg: No edema.   Lymphadenopathy:      Cervical: No cervical adenopathy.      Right cervical: No superficial cervical adenopathy.     Left cervical: No superficial cervical adenopathy.   Skin:     General: Skin is warm.      Findings: No erythema or rash.      Nails: There is no clubbing.   Neurological:      Mental Status: She is alert and oriented to person, place, and time.      Coordination: Coordination normal.      Gait: Gait normal.      Deep Tendon Reflexes: Reflexes are normal and symmetric.      Comments: CN grossly intact   Psychiatric:         Attention and Perception: Attention and perception normal. She is attentive.         Mood and Affect: Mood normal. Affect is flat.         Speech: Speech normal.         Behavior: Behavior normal. Behavior is cooperative.         Thought Content: Thought content normal.         Cognition and Memory: Cognition and memory normal.         Judgment: Judgment normal.       A/P    Diagnoses and all orders for this visit:    1. Physical exam, annual (Primary)  -     CBC (No Diff);  Future  -     Comprehensive Metabolic Panel; Future  -     TSH Rfx On Abnormal To Free T4; Future  -     Lipid Panel; Future  -     Hemoglobin A1c; Future  -     Hepatitis C Antibody; Future  -     CBC (No Diff)  -     Comprehensive Metabolic Panel  -     TSH Rfx On Abnormal To Free T4  -     Lipid Panel  -     Hemoglobin A1c  -     Hepatitis C Antibody  PE completed  Preventative labs ordered  Colonoscopy is up-to-date  Mammogram due in November  Gynecologist - pap smear to be completed next year  Dentist - has dentures, examined mouth and encouraged her to screen for oral cancer  Ophthalmologist - encouraged her to go regularly  Dermatologist - she denies moles or lesions of concern  Vaccinations discussed    2. RUQ pain  -     US Gallbladder; Future  TTP on exam.  States it has hurt for the last month.  No other symptoms.  Will check US.    3. Prediabetes  -     Hemoglobin A1c; Future  -     Hemoglobin A1c    4. Attention deficit disorder (ADD) without hyperactivity  5. Anxiety  6. Mild episode of recurrent major depressive disorder  Followed by psychiatry.  Stable, well-controlled.  Compliant on medication.    7. Renal insufficiency  Followed by  nephrology.    8. Primary hypertension  Stable, well-controlled.  Compliant on medication.    9. NICM (nonischemic cardiomyopathy)  10. Chronic systolic heart failure  Followed by cardiology.    11. Neuropathy  On gabapentin 300 mg nightly.    Reports this helps with her neuropathic pain.  Denies any adverse effects.  UDS, CSC and logan up-to-date.  Return in 3 months.    12. Screening for cholesterol level  -     Lipid Panel; Future  -     Lipid Panel    13. Screening for diabetes mellitus  -     Comprehensive Metabolic Panel; Future  -     Comprehensive Metabolic Panel    14. Screening for thyroid disorder  -     TSH Rfx On Abnormal To Free T4; Future  -     TSH Rfx On Abnormal To Free T4    15. Screening for deficiency anemia  -     CBC (No Diff); Future  -      CBC (No Diff)    16. Encounter for hepatitis C screening test for low risk patient  -     Hepatitis C Antibody; Future  -     Hepatitis C Antibody    17. Cigarette smoker  -      CT Chest Low Dose Cancer Screening WO; Future    18. Encounter for screening for lung cancer  -      CT Chest Low Dose Cancer Screening WO; Future    19. Encounter for screening mammogram for malignant neoplasm of breast  -     Mammo Screening Digital Tomosynthesis Bilateral With CAD; Future    20. Immunization due  -     Tdap Vaccine Greater Than or Equal To 8yo IM  -     Pneumococcal Conjugate Vaccine 20-Valent All         Plan of care reviewed with patient at the conclusion of today's visit. Education was provided regarding diet , nutrition , exercise, weight management, supplements, preventative screenings, vaccinations, hypertension, and diabetes diagnosis, management and any prescribed or recommended OTC medications.  Patient verbalizes understanding of and agreement with management plan.    Dictated Utilizing Dragon Dictation     Please note that portions of this note were completed with a voice recognition program.     Part of this note may be an electronic transcription/translation of spoken language to printed text using the Dragon Dictation System.    Return in about 3 months (around 9/16/2023) for Recheck, CM.     Sandee Keen PA-C

## 2023-06-17 LAB — HBA1C MFR BLD: 5.8 % (ref 4.8–5.6)

## 2023-08-01 RX ORDER — TORSEMIDE 5 MG/1
TABLET ORAL
Qty: 30 TABLET | Refills: 1 | Status: SHIPPED | OUTPATIENT
Start: 2023-08-01

## 2023-08-17 ENCOUNTER — TELEPHONE (OUTPATIENT)
Dept: FAMILY MEDICINE CLINIC | Facility: CLINIC | Age: 58
End: 2023-08-17
Payer: COMMERCIAL

## 2023-08-17 NOTE — TELEPHONE ENCOUNTER
Maddie, , called from [facility] Lung Screening Dept. She wants to discuss the patient's Lung Screening results with Sandee Keen PA-C.  She would also like to discuss the Tumor Board recommendations.

## 2023-08-18 NOTE — TELEPHONE ENCOUNTER
Date of  Admission: 2/22/2019  9:03 AM 
  
Claudio Nance is a 80 y.o. male admitted for Back pain [M54.9] Subjective:  male with past medical history significant for Hypertension, GERD, Elevated cholesterol, DJD, Prostate CA, and Hyperparathyroidism who presents via EMS from private residence with chief complaint of evaluation after GLF Cardiology called for PAF Patient does not recall what happened and he seems to be pleasantly confused Assessment/Plan: his ECG shows NSR PAcs pcvs fav ivcd and nstt Monitor strips noted with occasional P. Atrial flutter although I cannot r/o PAT instead at this time His troponin is normal  
tsh is elevated defer to primary team 
Echo pending There is a question of some degree of conduction disease and I suspect he does have some degree of tachy candice syndrome This was discussed with his daughters Possibility of PPM need discussed 
 
 no  Av sole blocking agents for now unless of persistent AF 
 no oac for now given fall ( he falls 1-2 times a week his family tells me) 
 continue to monitor for now Proceed with UA as well Confusion: defer to primary team 
Head ct ok Chest ct no pe and gallstones Patient Active Problem List  
 Diagnosis Date Noted  Back pain 02/22/2019  Left ventricular hypertrophy due to hypertensive disease 09/23/2015  Memory deficit 12/21/2014  Hyperparathyroidism, primary ([address] 06/19/2014  Chronic kidney disease (CKD), stage III (moderate) ([address] 06/19/2014  Bradycardia 06/19/2013  PVC's (premature ventricular contractions) 06/19/2013  Nonunion of fracture 11/29/2012  Drug-induced osteoporosis 10/16/2012  Traumatic osteoarthritis of left ankle 10/04/2012  Post-surgical hypothyroidism 04/05/2012  Prostate cancer ([address] 09/13/2010  Low back pain 04/01/2010  Obstructive sleep apnea 03/22/2010  Colon polyp 03/22/2010  Primary hypercholesterolemia 03/22/2010 Spoke with patient.  She is fine going to [facility].    Benign hypertension with chronic kidney disease, stage III ([facility] 03/22/2010  Chorioretinitis 03/22/2010  DJD (degenerative joint disease) of lumbar spine 03/22/2010 Jazmyne Cox MD 
Past Medical History:  
Diagnosis Date  Cancer [facility])   
 prostate  DJD (degenerative joint disease)  Elevated cholesterol  GERD (gastroesophageal reflux disease)  Hyperparathyroidism, primary ([facility]  Hypertension  Obstructive sleep apnea   
 uses cpap  Thyroid disease HX of Graves Disease Past Surgical History:  
Procedure Laterality Date  HX HEENT    
 thyroidectomy  HX HEENT    
 tonsilectomy [address]  HX ORTHOPAEDIC    
 right femoral neck fracture  HX ORTHOPAEDIC    
 bilateral ankle surgery  HX ORTHOPAEDIC  12/12/12 LEFT ANKLE REFUSION AND REMOVAL POSTERIOR SCREW  
 [facility] COLONOS[facility] FLX DX W/COLLJ SPEC WHEN PFRMD  12/11/2006 Dr Pedro Jolly  repeat 12/2011  [facility] PROSTATE BIOPSY, NEEDLE, SATURATION SAMPLING No Known Allergies Family History Problem Relation Age of Onset  Hypertension Mother  Heart Disease Father CAD  Heart Disease Brother Current Facility-Administered Medications Medication Dose Route Frequency  sodium chloride (NS) flush 5-40 mL  5-40 mL IntraVENous Q8H  
 sodium chloride (NS) flush 5-40 mL  5-40 mL IntraVENous PRN  
 sodium chloride (NS) flush 5-40 mL  5-40 mL IntraVENous Q8H  
 sodium chloride (NS) flush 5-40 mL  5-40 mL IntraVENous PRN  
 acetaminophen (TYLENOL) tablet 650 mg  650 mg Oral Q4H PRN  
 HYDROcodone-acetaminophen (NORCO) 5-325 mg per tablet 1 Tab  1 Tab Oral Q4H PRN  
 morphine injection 1 mg  1 mg IntraVENous Q4H PRN  
 naloxone (NARCAN) injection 0.4 mg  0.4 mg IntraVENous PRN  
 ondansetron (ZOFRAN ODT) tablet 4 mg  4 mg Oral Q4H PRN  
 bisacodyl (DULCOLAX) tablet 5 mg  5 mg Oral DAILY PRN  
  0.9% sodium chloride with KCl 20 mEq/L infusion   IntraVENous CONTINUOUS  
 [START ON 2/23/2019] pravastatin (PRAVACHOL) tablet 40 mg  40 mg Oral DAILY  [START ON 2/23/2019] tamsulosin (FLOMAX) capsule 0.4 mg  0.4 mg Oral DAILY  [START ON 2/23/2019] levothyroxine (SYNTHROID) tablet 150 mcg  150 mcg Oral ACB  cloNIDine HCl (CATAPRES) tablet 0.2 mg  0.2 mg Oral Q8H PRN  
 dilTIAZem (CARDIZEM) injection 25 mg  25 mg IntraVENous ONCE PRN  
 hydrALAZINE (APRESOLINE) 20 mg/mL injection 10 mg  10 mg IntraVENous Q6H PRN  
 bacitracin 500 unit/gram ointment   Topical TID Review of Symptoms: 
Pertinent items are noted in HPI. Physical Exam 
 
Visit Vitals /50 Pulse 76 Temp 97.5 °F (36.4 °C) Resp 20 Ht 6' 2\" (1.88 m) Wt 97.5 kg (215 lb) SpO2 96% BMI 27.60 kg/m² Neck: no JVD Heart: regular rate and rhythm Lungs: clear to auscultation bilaterally Abdomen: soft, non-tender. Bowel sounds normal. No masses,  no organomegaly Extremities: no edema Cardiographics Telemetry: atrial flutt P. 
ECG: normal sinus rhythm, nonspecific ST and T waves changes, PAC's noted, occasional PVC noted, unifocal 
Echocardiogram: Not done Recent radiology, intake/output and wt reviewed Labs:  
Recent Results (from the past 24 hour(s)) EKG, 12 LEAD, INITIAL Collection Time: 02/22/19  9:15 AM  
Result Value Ref Range Ventricular Rate 71 BPM  
 Atrial Rate 71 BPM  
 P-R Interval 182 ms QRS Duration 92 ms Q-T Interval 386 ms QTC Calculation (Bezet) 419 ms Calculated R Axis 8 degrees Calculated T Axis 17 degrees Diagnosis Sinus rhythm with premature supraventricular complexes and with occasional  
premature ventricular complexes Nonspecific T wave abnormality When compared with ECG of 13-DEC-2018 11:12, 
premature supraventricular complexes are now present Nonspecific T wave abnormality, improved in Inferior leads Confirmed by Rj Melo [facility], [facility] ([id number]) on 2/22/2019 4:55:08 PM 
  
SAMPLES BEING HELD Collection Time: 02/22/19  9:18 AM  
Result Value Ref Range SAMPLES BEING HELD 1RED, 1BLU   
 COMMENT Add-on orders for these samples will be processed based on acceptable specimen integrity and analyte stability, which may vary by analyte. CBC WITH AUTOMATED DIFF Collection Time: 02/22/19  9:18 AM  
Result Value Ref Range WBC 5.7 4.1 - 11.1 K/uL  
 RBC 3.82 (L) 4.10 - 5.70 M/uL  
 HGB 12.5 12.1 - 17.0 g/dL HCT 39.5 36.6 - 50.3 % .4 (H) 80.0 - 99.0 FL  
 MCH 32.7 26.0 - 34.0 PG  
 MCHC 31.6 30.0 - 36.5 g/dL  
 RDW 14.3 11.5 - 14.5 % PLATELET 503 829 - 080 K/uL MPV 10.6 8.9 - 12.9 FL  
 NRBC 0.0 0  WBC ABSOLUTE NRBC 0.00 0.00 - 0.01 K/uL NEUTROPHILS 74 32 - 75 % LYMPHOCYTES 16 12 - 49 % MONOCYTES 10 5 - 13 % EOSINOPHILS 0 0 - 7 % BASOPHILS 0 0 - 1 % IMMATURE GRANULOCYTES 0 %  
 ABS. NEUTROPHILS 4.2 1.8 - 8.0 K/UL  
 ABS. LYMPHOCYTES 0.9 0.8 - 3.5 K/UL  
 ABS. MONOCYTES 0.6 0.0 - 1.0 K/UL  
 ABS. EOSINOPHILS 0.0 0.0 - 0.4 K/UL  
 ABS. BASOPHILS 0.0 0.0 - 0.1 K/UL  
 ABS. IMM. GRANS. 0.0 K/UL  
 DF MANUAL PLATELET COMMENTS Large Platelets RBC COMMENTS MACROCYTOSIS 
1+ WBC COMMENTS REACTIVE LYMPHS    
METABOLIC PANEL, COMPREHENSIVE Collection Time: 02/22/19  9:18 AM  
Result Value Ref Range Sodium 137 136 - 145 mmol/L Potassium 3.8 3.5 - 5.1 mmol/L Chloride 106 97 - 108 mmol/L  
 CO2 27 21 - 32 mmol/L Anion gap 4 (L) 5 - 15 mmol/L Glucose 101 (H) 65 - 100 mg/dL BUN 23 (H) 6 - 20 MG/DL Creatinine 1.20 0.70 - 1.30 MG/DL  
 BUN/Creatinine ratio 19 12 - 20 GFR est AA >60 >60 ml/min/1.73m2 GFR est non-AA 57 (L) >60 ml/min/1.73m2 Calcium 11.0 (H) 8.5 - 10.1 MG/DL Bilirubin, total 1.3 (H) 0.2 - 1.0 MG/DL  
 ALT (SGPT) 19 12 - 78 U/L  
 AST (SGOT) 25 15 - 37 U/L Alk.  phosphatase 137 (H) 45 - 117 U/L  
 Protein, total 8.9 (H) 6.4 - 8.2 g/dL Albumin 3.2 (L) 3.5 - 5.0 g/dL Globulin 5.7 (H) 2.0 - 4.0 g/dL A-G Ratio 0.6 (L) 1.1 - 2.2 VITAMIN B12 Collection Time: 02/22/19  9:18 AM  
Result Value Ref Range Vitamin B12 585 193 - 986 pg/mL FOLATE Collection Time: 02/22/19  9:18 AM  
Result Value Ref Range Folate 18.6 5.0 - 21.0 ng/mL TROPONIN I Collection Time: 02/22/19  9:18 AM  
Result Value Ref Range Troponin-I, Qt. <0.05 <0.05 ng/mL TSH 3RD GENERATION Collection Time: 02/22/19  9:18 AM  
Result Value Ref Range TSH 6.73 (H) 0.36 - 3.74 uIU/mL EKG, 12 LEAD, INITIAL Collection Time: 02/22/19  2:41 PM  
Result Value Ref Range Ventricular Rate 81 BPM  
 Atrial Rate 81 BPM  
 P-R Interval 216 ms  
 QRS Duration 110 ms  
 Q-T Interval 382 ms QTC Calculation (Bezet) 443 ms Calculated P Axis -13 degrees Calculated R Axis 24 degrees Calculated T Axis 55 degrees Diagnosis Sinus rhythm with 1st degree AV block with frequent premature ventricular  
complexes Nonspecific ST and T wave abnormality Abnormal ECG When compared with ECG of 22-FEB-2019 09:15, 
premature supraventricular complexes are no longer present MA interval has increased Nonspecific T wave abnormality, worse in Inferior leads Confirmed by Kaylyn Kidd MD, [facility] ([id number]) on 2/22/2019 4:55:19 PM 
  
ECHO ADULT COMPLETE Collection Time: 02/22/19  3:40 PM  
Result Value Ref Range LV E' Lateral Velocity 9.38 cm/s LV E' Septal Velocity 4.94 cm/s Ao Root D 4.49 cm Aortic Valve Systolic Peak Velocity 386.38 cm/s AoV VTI 56.57 cm AoV PG 33.2 mmHg LVIDd 4.75 4.2 - 5.9 cm  
 LVPWd 1.20 (A) 0.6 - 1.0 cm LVIDs 3.57 cm IVSd 1.17 (A) 0.6 - 1.0 cm Left Atrium to Aortic Root Ratio 1.03   
 RVIDd 5.69 cm Aortic Valve Systolic Mean Gradient 73.5 mmHg LV Mass .6 (A) 88 - 224 g LV Mass AL Index 111.8 49 - 115 g/m2 Left Atrium Major Axis 4.60 cm Triscuspid Valve Regurgitation Peak Gradient 23.6 mmHg  
 TR Max Velocity 242.64 cm/s  
EKG, 12 LEAD, INITIAL Collection Time: 02/22/19  6:21 PM  
Result Value Ref Range Ventricular Rate 60 BPM  
 Atrial Rate 60 BPM  
 P-R Interval 192 ms QRS Duration 104 ms Q-T Interval 430 ms QTC Calculation (Bezet) 430 ms Calculated P Axis -29 degrees Calculated R Axis 14 degrees Calculated T Axis 45 degrees Diagnosis ** Poor data quality, interpretation may be adversely affected Sinus rhythm with Possible premature atrial complexes with aberrant  
conduction When compared with ECG of 22-FEB-2019 14:41, 
premature ventricular complexes are no longer present 
aberrant conduction is now present Nonspecific T wave abnormality, improved in Inferior leads Nonspecific T wave abnormality, improved in Lateral leads

## 2023-08-18 NOTE — TELEPHONE ENCOUNTER
Spoke with Maddie.  Recommendations are for patient to see pulmonology.  They can get her in at [facility] next week for evaluation.    Attempted to contact patient regarding recommendations to see a pulmonologist.  Her CT chest shows nodule and further evaluation is necessary.  No answer, left message.  Wanted to see if she is okay seeing a provider at [facility] or if she prefers [facility].

## 2023-08-23 ENCOUNTER — TELEPHONE (OUTPATIENT)
Dept: FAMILY MEDICINE CLINIC | Facility: CLINIC | Age: 58
End: 2023-08-23
Payer: COMMERCIAL

## 2023-08-23 NOTE — TELEPHONE ENCOUNTER
PATIENT STATES THAT SHE WAS SUPPOSED TO BE REFERRED TO SOMEONE FOR THE SPOT ON HER CT SCAN; PT STATES SHE HAS NOT HEARD FROM ANYONE; CHECKED PATIENT'S CHART; NO REFERRAL FOR ANY SPECIALIST; PLEASE ADVISE.

## 2023-08-23 NOTE — TELEPHONE ENCOUNTER
Patient was supposed to be contacted by [facility].  The person who was going to call her was Maddie PULIDO.  Her phone number is [phone].  Patient should call the phone number directly and schedule appointment.

## 2023-08-26 DIAGNOSIS — E53.8 FOLATE DEFICIENCY: ICD-10-CM

## 2023-08-28 DIAGNOSIS — G62.9 NEUROPATHY: ICD-10-CM

## 2023-08-28 RX ORDER — FOLIC ACID 1 MG/1
TABLET ORAL
Qty: 30 TABLET | Refills: 1 | Status: SHIPPED | OUTPATIENT
Start: 2023-08-28

## 2023-08-28 RX ORDER — GABAPENTIN 300 MG/1
300 CAPSULE ORAL NIGHTLY
Qty: 30 CAPSULE | Refills: 2 | Status: CANCELLED | OUTPATIENT
Start: 2023-08-28

## 2023-08-28 RX ORDER — TORSEMIDE 5 MG/1
TABLET ORAL
Qty: 30 TABLET | Refills: 1 | Status: SHIPPED | OUTPATIENT
Start: 2023-08-28

## 2023-08-28 RX ORDER — GABAPENTIN 300 MG/1
300 CAPSULE ORAL NIGHTLY
Qty: 30 CAPSULE | Refills: 0 | Status: SHIPPED | OUTPATIENT
Start: 2023-08-28

## 2023-08-28 NOTE — TELEPHONE ENCOUNTER
Rx Refill Note  Requested Prescriptions     Pending Prescriptions Disp Refills    folic acid (FOLVITE) 1 MG tablet [Pharmacy Med Name: FOLIC ACID 1 MG TABLET] 90 tablet 0     Sig: TAKE 1 TABLET BY MOUTH EVERY DAY      Last office visit with prescribing clinician: 6/16/2023   Last telemedicine visit with prescribing clinician: Visit date not found   Next office visit with prescribing clinician: 9/22/2023                         Would you like a call back once the refill request has been completed: [] Yes [] No    If the office needs to give you a call back, can they leave a voicemail: [] Yes [] No    April JOSIAH Toledo  08/28/23, 08:29 EDT

## 2023-08-28 NOTE — TELEPHONE ENCOUNTER
Last labs 7/7/2023, Creatinine 2.23. Slightly up from previous. She has an appointment with you      OK to fill?

## 2023-08-28 NOTE — TELEPHONE ENCOUNTER
Rx Refill Note  Requested Prescriptions     Pending Prescriptions Disp Refills    gabapentin (NEURONTIN) 300 MG capsule 30 capsule 2     Sig: Take 1 capsule by mouth Every Night.      Last office visit with prescribing clinician: 6/16/2023   Last telemedicine visit with prescribing clinician: Visit date not found   Next office visit with prescribing clinician: 9/22/2023                         Would you like a call back once the refill request has been completed: [] Yes [] No    If the office needs to give you a call back, can they leave a voicemail: [] Yes [] No    April JOSIAH Toledo  08/28/23, 10:37 EDT      Csa: 10/03/2022  Uds: 10/03/2022

## 2023-09-06 ENCOUNTER — OFFICE VISIT (OUTPATIENT)
Dept: CARDIOLOGY | Facility: CLINIC | Age: 58
End: 2023-09-06
Payer: COMMERCIAL

## 2023-09-06 ENCOUNTER — HOSPITAL ENCOUNTER (OUTPATIENT)
Dept: ULTRASOUND IMAGING | Facility: HOSPITAL | Age: 58
Discharge: HOME | End: 2023-09-06
Admitting: PHYSICIAN ASSISTANT
Payer: COMMERCIAL

## 2023-09-06 VITALS
WEIGHT: 184 LBS | SYSTOLIC BLOOD PRESSURE: 130 MMHG | BODY MASS INDEX: 30.66 KG/M2 | HEART RATE: 74 BPM | DIASTOLIC BLOOD PRESSURE: 80 MMHG | HEIGHT: 65 IN | OXYGEN SATURATION: 93 %

## 2023-09-06 DIAGNOSIS — I50.22 CHRONIC SYSTOLIC HEART FAILURE: ICD-10-CM

## 2023-09-06 DIAGNOSIS — I10 PRIMARY HYPERTENSION: ICD-10-CM

## 2023-09-06 DIAGNOSIS — R10.11 RUQ PAIN: ICD-10-CM

## 2023-09-06 DIAGNOSIS — N28.9 RENAL INSUFFICIENCY: ICD-10-CM

## 2023-09-06 DIAGNOSIS — I42.8 NICM (NONISCHEMIC CARDIOMYOPATHY): Primary | ICD-10-CM

## 2023-09-06 PROCEDURE — 76705 ECHO EXAM OF ABDOMEN: CPT

## 2023-09-06 RX ORDER — MIRTAZAPINE 15 MG/1
15 TABLET, FILM COATED ORAL DAILY
COMMUNITY
Start: 2023-08-22

## 2023-09-06 NOTE — PATIENT INSTRUCTIONS
Take doses of torsemide for 2 days and see if it helps your breathing with walking and when you lay down to sleep at night

## 2023-09-06 NOTE — PROGRESS NOTES
Established Patient Office Visit    Patient Name: Nae Sanchez  : 1965   MRN: [id number]   Care Team: Patient Care Team:  Sandee Keen PA-C as PCP - General (Physician Assistant)  Petey Leigh MD as Cardiologist (Cardiology)  Joshua Brown APRN (Nurse Practitioner)  Cecy Coles MD (Nephrology)    Chief Complaint   Patient presents with    NICM (nonischemic cardiomyopathy)     HPI: Nae Sanchez is a 58 y.o. female with a history of previous tobacco use, COPD, and nonischemic, dilated cardiomyopathy, resolved apical thrombus who presents today for routine follow-up.  Since her last visit with me in February she reports that she has been feeling well from a cardiac standpoint.  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  She does not have any leg swelling, PND, orthopnea, chest pain.  She does note that she has been gaining weight and was noted to be 14 pounds heavier today than she was in February.  She does have a prescription for torsemide however has not had to take it as needed recently.    Subjective   Review of Systems   Constitutional:  Positive for unexpected weight gain. Negative for activity change and fatigue.   Respiratory:  Positive for shortness of breath.    Cardiovascular:  Negative for chest pain, palpitations and leg swelling.     Social History     Tobacco Use   Smoking Status Former    Packs/day: 2.00    Years: 40.00    Pack years: 80.00    Types: Cigarettes    Start date: 1982    Quit date: 2022    Years since quittin.0    Passive exposure: Never   Smokeless Tobacco Former    Quit date: 2022      No Known Allergies      Current Outpatient Medications:     aspirin 81 MG EC tablet, Take 1 tablet by mouth Daily., Disp: 30 tablet, Rfl: 6    atorvastatin (LIPITOR) 40 MG tablet, Take 1 tablet by mouth Every Night., Disp: 90 tablet,  "Rfl: 1    Calcium Carbonate-Vitamin D (Oyster Shell Calcium/D) 500-5 MG-MCG tablet, Take 1 tablet by mouth Daily., Disp: , Rfl:     carvedilol (COREG) 12.5 MG tablet, TAKE 1.5 TABLETS BY MOUTH 2 (TWO) TIMES A DAY WITH MEALS  DOSES., Disp: 270 tablet, Rfl: 1    famotidine (PEPCID) 20 MG tablet, TAKE 1 TABLET BY MOUTH TWICE A DAY, Disp: 60 tablet, Rfl: 1    folic acid (FOLVITE) 1 MG tablet, TAKE 1 TABLET BY MOUTH EVERY DAY, Disp: 30 tablet, Rfl: 1    gabapentin (NEURONTIN) 300 MG capsule, Take 1 capsule by mouth Every Night., Disp: 30 capsule, Rfl: 0    Jardiance 10 MG tablet tablet, TAKE 1 TABLET BY MOUTH EVERY DAY, Disp: 90 tablet, Rfl: 1    mirtazapine (REMERON) 15 MG tablet, Take 1 tablet by mouth Daily., Disp: , Rfl:     valsartan (DIOVAN) 40 MG tablet, TAKE 1 TABLET BY MOUTH EVERY DAY, Disp: 90 tablet, Rfl: 2    Vyvanse 50 MG capsule, Take 1 capsule by mouth Every Morning, Disp: , Rfl:       Objective     Vitals:    09/06/23 1417   BP: 130/80   BP Location: Left arm   Patient Position: Sitting   Cuff Size: Adult   Pulse: 74   SpO2: 93%   Weight: 83.5 kg (184 lb)   Height: 165.1 cm (65\")     Body mass index is 30.62 kg/m².  Gen: well developed, white female, sitting up on exam table  HEENT: MMM, sclera anicteric, conjunctiva normal  CV: regular rate, regular rhythm, no murmurs or rubs, normal S1, S2. 2+ radial pulses  Pulm: RA, normal work of breathing, no wheezes, rales, rhonchi  Ext: normal bulk for age, normal tone, no dependent edema  Neuro: alert, oriented, face symmetrical, moving all extremities well  Psych: normal mood, appropriate affect    RESULTS:     [facility] 9/1/2022  FINAL IMPRESSION:  Angulated/tortuous proximal LCx with nonocclusive (up to 50%) plaque with normal IFR of 1.0.  Angiographically near normal left main, the LAD and the dominant RCA.  Dilated, probably nonischemic cardiomyopathy with severe left ventricular systolic dysfunction, estimated ejection fraction 30%.  Left ventricular " apical filling defect highly suggestive of apical thrombus.    12/6/22 - Transthoracic Echo Complete W/ Cont if Necessary Per Protocol      The left ventricular cavity is mildly dilated.    Left ventricular systolic function is mildly decreased. Calculated left ventricular EF = 41%    Left ventricular diastolic function is consistent with (grade Ia w/high LAP) impaired relaxation.    EF is improved from previous.    No LV thrombus seen with LV opacification agent    8/30/22 - TTE  Estimated left ventricular EF = 25% Left ventricular ejection fraction appears to be 21 - 25%. Left ventricular systolic function is severely decreased.  There is a small left pleural effusion.  The following left ventricular wall segments are hypokinetic: mid anterior, apical anterior, basal anterolateral, mid anterolateral, apical lateral, basal inferolateral, mid inferolateral, mid anteroseptal and basal anterior. The following left ventricular wall segments are akinetic: apical inferior, basal inferior, mid inferior, apical septal, basal inferoseptal, mid inferoseptal and apex.  The left ventricular cavity is mild to moderately dilated.  The findings are consistent with dilated cardiomyopathy.  Left ventricular diastolic function is consistent with (grade I) impaired relaxation.  Left atrial volume is mildly increased.  Estimated right ventricular systolic pressure from tricuspid regurgitation is normal (<35 mmHg).  Normal right ventricular cavity size, wall thickness and septal motion noted with moderately reduced right ventricular systolic function.  No evidence of left ventricular thrombus or mass present.  No evidence of pulmonary hypertension is present.  Mild to moderate aortic valve regurgitation is present.    Labs:  Lab Results   Component Value Date    CREATININE 2.18 (H) 06/16/2023    BUN 31 (H) 06/16/2023     06/16/2023    K 4.6 06/16/2023     06/16/2023    CO2 25.5 06/16/2023     Renal function panel   07/07/23  - Cr 2.23, BUN 22, K 4.2  01/12/23 - Cr 1.61, potassium 5.1, total CO2 31    Lab Results   Component Value Date    WBC 15.84 (H) 07/07/2023    HGB 14.8 07/07/2023    HCT 45.6 (H) 07/07/2023    MCV 86 07/07/2023     (H) 07/07/2023     Lab Results   Component Value Date    HGBA1C 5.80 (H) 06/16/2023     Lab Results   Component Value Date    CHOL 231 (H) 06/16/2023    TRIG 74 06/16/2023    HDL 57 06/16/2023     (H) 06/16/2023     Most recent PCP note, imaging tests, and labs reviewed.      ECG 12 Lead    Date/Time: 9/6/2023 2:26 PM  Performed by: Petey Leigh MD  Authorized by: Petey Leigh MD   Comparison: compared with previous ECG from 9/1/2022  Similar to previous ECG  Rhythm: sinus rhythm  Rate: normal  BPM: 77  Conduction: left anterior fascicular block  T flattening: I and V6    Clinical impression: abnormal EKG            Assessment & Plan       ICD-10-CM ICD-9-CM   1. NICM (nonischemic cardiomyopathy)  I42.8 425.4   2. Chronic systolic heart failure  I50.22 428.22   3. Primary hypertension  I10 401.9   4. Renal insufficiency  N28.9 593.9        Chronic heart failure with reduced ejection fraction, EF improved to 41%  Nonischemic cardiomyopathy  LV thrombus, resolved   - GDMT with carvedilol, valsartan, empagliflozin    -With some of her dyspnea will have her take torsemide for 2 to 3 days and monitor for any change in her symptoms    Primary hypertension, well controlled   - Changes as above    HLD   - continue statin    Stage III CKD    Return in about 4 months (around 1/6/2024).    KAMALJIT Leigh MD, MS  09/06/23    [facility]  [facility] [address] [address] [phone]

## 2023-09-20 DIAGNOSIS — E53.8 FOLATE DEFICIENCY: ICD-10-CM

## 2023-09-20 RX ORDER — TORSEMIDE 5 MG/1
TABLET ORAL
Qty: 30 TABLET | Refills: 6 | Status: SHIPPED | OUTPATIENT
Start: 2023-09-20

## 2023-09-20 RX ORDER — FOLIC ACID 1 MG/1
TABLET ORAL
Qty: 30 TABLET | Refills: 0 | Status: SHIPPED | OUTPATIENT
Start: 2023-09-20

## 2023-09-20 NOTE — TELEPHONE ENCOUNTER
Rx Refill Note  Requested Prescriptions     Pending Prescriptions Disp Refills    folic acid (FOLVITE) 1 MG tablet [Pharmacy Med Name: FOLIC ACID 1 MG TABLET] 30 tablet 1     Sig: TAKE 1 TABLET BY MOUTH EVERY DAY      Last office visit with prescribing clinician: 6/16/2023   Last telemedicine visit with prescribing clinician: Visit date not found   Next office visit with prescribing clinician: 9/22/2023                         Would you like a call back once the refill request has been completed: [] Yes [] No    If the office needs to give you a call back, can they leave a voicemail: [] Yes [] No    Evonne Toledo MA  09/20/23, 12:02 EDT

## 2023-09-20 NOTE — TELEPHONE ENCOUNTER
After reviewing the refill request it looks like a MA discontinued the medication at the end of August and put therapy complete. Last renal panel was 7/7/2023 and her creatinine was 2.3. She does have CKD III. She has a follow up mariella with  on 02/07/2024.    Please advise on if you want me to renew this prescription or want any labs first.

## 2023-09-22 ENCOUNTER — OFFICE VISIT (OUTPATIENT)
Dept: FAMILY MEDICINE CLINIC | Facility: CLINIC | Age: 58
End: 2023-09-22

## 2023-09-22 VITALS
HEIGHT: 65 IN | DIASTOLIC BLOOD PRESSURE: 78 MMHG | WEIGHT: 187.4 LBS | BODY MASS INDEX: 31.22 KG/M2 | SYSTOLIC BLOOD PRESSURE: 126 MMHG | HEART RATE: 83 BPM | OXYGEN SATURATION: 96 %

## 2023-09-22 DIAGNOSIS — I50.22 CHRONIC SYSTOLIC HEART FAILURE: ICD-10-CM

## 2023-09-22 DIAGNOSIS — G62.9 NEUROPATHY: ICD-10-CM

## 2023-09-22 DIAGNOSIS — J44.9 CHRONIC OBSTRUCTIVE PULMONARY DISEASE, UNSPECIFIED COPD TYPE: ICD-10-CM

## 2023-09-22 DIAGNOSIS — N28.9 RENAL INSUFFICIENCY: ICD-10-CM

## 2023-09-22 DIAGNOSIS — R73.03 PREDIABETES: Primary | ICD-10-CM

## 2023-09-22 PROBLEM — Z72.0 TOBACCO USE: Status: RESOLVED | Noted: 2022-08-31 | Resolved: 2023-09-22

## 2023-09-22 LAB
EXPIRATION DATE: NORMAL
HBA1C MFR BLD: 6 %
Lab: NORMAL

## 2023-09-22 PROCEDURE — 99214 OFFICE O/P EST MOD 30 MIN: CPT | Performed by: PHYSICIAN ASSISTANT

## 2023-09-22 PROCEDURE — 83036 HEMOGLOBIN GLYCOSYLATED A1C: CPT | Performed by: PHYSICIAN ASSISTANT

## 2023-09-22 RX ORDER — GABAPENTIN 300 MG/1
300 CAPSULE ORAL NIGHTLY
Qty: 30 CAPSULE | Refills: 3 | Status: SHIPPED | OUTPATIENT
Start: 2023-09-22

## 2023-09-24 NOTE — PROGRESS NOTES
Chief Complaint   Patient presents with    Diabetes       Nae Sanchez is a pleasant 58 y.o. female who is here for routine follow-up of prediabetes, CHF, COPD, renal insufficiency and neuropathy.  Patient is compliant on all medications.  She is taking gabapentin 300 mg nightly and feels this is helping with her neuropathic pain.  Her blood pressure is stable and well-controlled.  Followed by cardiology and nephrology.    Past Medical History:   Diagnosis Date    ADHD (attention deficit hyperactivity disorder)     Anxiety     CHF (congestive heart failure) 2022    Coronary artery disease 2022    Depression     Diverticulosis     Heart murmur 1965    Hypertension 2022    Myocardial infarction 2022    Osteopenia     Peptic ulceration     Renal insufficiency     Sleep apnea  noticed    Tobacco use 2022       Past Surgical History:   Procedure Laterality Date    CARDIAC CATHETERIZATION N/A 2022    Procedure: Left Heart Cath;  Surgeon: Greta Sanchez MD;  Location:  [facility] CATH [facility]ASIVE LOC[facility];  Service: Cardiology;  Laterality: N/A;     SECTION  1997    COLONOSCOPY  2019    COLONOSCOPY N/A 01/10/2023    Procedure: COLONOSCOPY;  Surgeon: Rebel De Paz MD;  Location:  [facility] ENDOSCOPY;  Service: Gastroenterology;  Laterality: N/A;    FOOT SURGERY Left     FRACTURE SURGERY  21    Left foot    TUBAL ABDOMINAL LIGATION  97       Family History   Problem Relation Age of Onset    Anxiety disorder Mother     Hypertension Father     Diabetes Father     No Known Problems Sister     Heart disease Brother     Early death Brother     Mental illness Daughter     Breast cancer Neg Hx        Social History     Socioeconomic History    Marital status:    Tobacco Use    Smoking status: Former     Packs/day: 2.00     Years: 40.00     Pack years: 80.00     Types: Cigarettes     Start date: 1982     Quit date: 2022     Years  "since quittin.0     Passive exposure: Never    Smokeless tobacco: Former     Quit date: 2022   Vaping Use    Vaping Use: Never used   Substance and Sexual Activity    Alcohol use: No    Drug use: No    Sexual activity: Not Currently     Partners: Male     Birth control/protection: Tubal ligation       No Known Allergies    ROS  Review of Systems   Constitutional:  Negative for chills and fever.   Respiratory:  Negative for cough.    Cardiovascular:  Negative for chest pain.   Neurological:  Negative for dizziness and headache.     Vitals:    23 1329   BP: 126/78   Pulse: 83   SpO2: 96%   Weight: 85 kg (187 lb 6.4 oz)   Height: 165.1 cm (65\")     Body mass index is 31.18 kg/m².    Current Outpatient Medications on File Prior to Visit   Medication Sig Dispense Refill    aspirin 81 MG EC tablet Take 1 tablet by mouth Daily. 30 tablet 6    atorvastatin (LIPITOR) 40 MG tablet Take 1 tablet by mouth Every Night. 90 tablet 1    Calcium Carbonate-Vitamin D (Oyster Shell Calcium/D) 500-5 MG-MCG tablet Take 1 tablet by mouth Daily.      carvedilol (COREG) 12.5 MG tablet TAKE 1.5 TABLETS BY MOUTH 2 (TWO) TIMES A DAY WITH MEALS  DOSES. 270 tablet 1    famotidine (PEPCID) 20 MG tablet TAKE 1 TABLET BY MOUTH TWICE A DAY 60 tablet 1    folic acid (FOLVITE) 1 MG tablet TAKE 1 TABLET BY MOUTH EVERY DAY 30 tablet 0    Jardiance 10 MG tablet tablet TAKE 1 TABLET BY MOUTH EVERY DAY 90 tablet 1    mirtazapine (REMERON) 15 MG tablet Take 1 tablet by mouth Daily.      torsemide (DEMADEX) 5 MG tablet TAKE 1 TABLET BY MOUTH DAILY AS NEEDED (WEIGHT GAIN OF 3LB OR INCREASED LEG SWELLING). 30 tablet 6    valsartan (DIOVAN) 40 MG tablet TAKE 1 TABLET BY MOUTH EVERY DAY 90 tablet 2    Vyvanse 50 MG capsule Take 1 capsule by mouth Every Morning       No current facility-administered medications on file prior to visit.       Results for orders placed or performed in visit on 23   POC Glycosylated Hemoglobin (Hb A1C)    " Specimen: Blood   Result Value Ref Range    Hemoglobin A1C 6.0 %    Lot Number [id number]     Expiration Date 05/07/2025        PE    Physical Exam  Vitals reviewed.   Constitutional:       General: She is not in acute distress.     Appearance: Normal appearance. She is well-developed. She is obese. She is not ill-appearing or diaphoretic.   HENT:      Head: Normocephalic and atraumatic.   Eyes:      Extraocular Movements: Extraocular movements intact.      Conjunctiva/sclera: Conjunctivae normal.   Cardiovascular:      Rate and Rhythm: Normal rate and regular rhythm.   Pulmonary:      Effort: No respiratory distress.   Musculoskeletal:         General: Normal range of motion.      Cervical back: Normal range of motion.      Right lower leg: No edema.      Left lower leg: No edema.   Skin:     General: Skin is warm.      Findings: No erythema or rash.   Neurological:      General: No focal deficit present.      Mental Status: She is alert.   Psychiatric:         Attention and Perception: She is attentive.         Mood and Affect: Mood normal.         Speech: Speech normal.         Behavior: Behavior normal. Behavior is cooperative.         Thought Content: Thought content normal.         Judgment: Judgment normal.              A/P    Diagnoses and all orders for this visit:    1. Prediabetes (Primary)  -     POC Glycosylated Hemoglobin (Hb A1C)  Previous hemoglobin AIC was 5.8%, hemoglobin AIC is 6.0% today.  On Jardiance 10 mg.    2. Neuropathy  -     gabapentin (NEURONTIN) 300 MG capsule; Take 1 capsule by mouth Every Night.  Dispense: 30 capsule; Refill: 3  Taking gabapentin 300 mg nightly.  Denies any adverse effects.  Reports good benefit with medication.  Return in 3 months.  UDS, CSC up-to-date.    3. Chronic obstructive pulmonary disease, unspecified COPD type  Stable without inhaler.    4. Chronic systolic heart failure  Blood pressure is normal.  Compliant on all medications.  Followed by cardiology.    5.  Renal insufficiency  Followed by nephrology.  Has upcoming appointment with blood work.         Plan of care reviewed with patient at the conclusion of today's visit. Education was provided regarding diagnosis, management and any prescribed or recommended OTC medications.  Patient verbalizes understanding of and agreement with management plan.    Dictated Utilizing Dragon Dictation     Please note that portions of this note were completed with a voice recognition program.     Part of this note may be an electronic transcription/translation of spoken language to printed text using the Dragon Dictation System.    Return in about 3 months (around 12/22/2023) for Recheck, CM.     SAMUEL Tolentino-Leobardo submitted by the patient for this visit:  Other (Submitted on 9/22/2023)  Please describe your symptoms.: Na  Have you had these symptoms before?: No  How long have you been having these symptoms?: 1-4 days  Primary Reason for Visit (Submitted on 9/22/2023)  What is the primary reason for your visit?: Other

## 2023-09-25 DIAGNOSIS — A49.9 ANGULAR CHEILITIS DUE TO BACTERIAL INFECTION: Primary | ICD-10-CM

## 2023-09-25 DIAGNOSIS — K13.0 ANGULAR CHEILITIS DUE TO BACTERIAL INFECTION: Primary | ICD-10-CM

## 2023-09-25 DIAGNOSIS — R10.816 EPIGASTRIC ABDOMINAL TENDERNESS WITHOUT REBOUND TENDERNESS: ICD-10-CM

## 2023-09-25 RX ORDER — FAMOTIDINE 20 MG/1
TABLET, FILM COATED ORAL
Qty: 60 TABLET | Refills: 1 | Status: SHIPPED | OUTPATIENT
Start: 2023-09-25

## 2023-09-25 NOTE — TELEPHONE ENCOUNTER
Rx Refill Note  Requested Prescriptions     Pending Prescriptions Disp Refills    famotidine (PEPCID) 20 MG tablet [Pharmacy Med Name: FAMOTIDINE 20 MG TABLET] 60 tablet 1     Sig: TAKE 1 TABLET BY MOUTH TWICE A DAY      Last office visit with prescribing clinician: 9/22/2023     Next office visit with prescribing clinician: 12/29/2023       Would you like a call back once the refill request has been completed: [] Yes [] No    If the office needs to give you a call back, can they leave a voicemail: [] Yes [] No    Candace العراقي MA  09/25/23, 14:17 EDT

## 2023-10-16 DIAGNOSIS — E53.8 FOLATE DEFICIENCY: ICD-10-CM

## 2023-10-16 RX ORDER — VALSARTAN 40 MG/1
TABLET ORAL
Qty: 90 TABLET | Refills: 2 | Status: SHIPPED | OUTPATIENT
Start: 2023-10-16

## 2023-10-16 RX ORDER — FOLIC ACID 1 MG/1
TABLET ORAL
Qty: 30 TABLET | Refills: 0 | Status: SHIPPED | OUTPATIENT
Start: 2023-10-16

## 2023-10-16 NOTE — TELEPHONE ENCOUNTER
Rx Refill Note  Requested Prescriptions     Pending Prescriptions Disp Refills    folic acid (FOLVITE) 1 MG tablet [Pharmacy Med Name: FOLIC ACID 1 MG TABLET] 30 tablet 0     Sig: TAKE 1 TABLET BY MOUTH EVERY DAY      Last office visit with prescribing clinician: 9/22/2023   Last telemedicine visit with prescribing clinician: Visit date not found   Next office visit with prescribing clinician: 12/29/2023                         Would you like a call back once the refill request has been completed: [] Yes [] No    If the office needs to give you a call back, can they leave a voicemail: [] Yes [] No    Luciano Escoto MA  10/16/23, 09:21 EDT

## 2023-10-29 DIAGNOSIS — I50.22 CHRONIC SYSTOLIC HEART FAILURE: ICD-10-CM

## 2023-10-29 DIAGNOSIS — R73.03 PREDIABETES: ICD-10-CM

## 2023-10-30 RX ORDER — EMPAGLIFLOZIN 10 MG/1
TABLET, FILM COATED ORAL
Qty: 90 TABLET | Refills: 1 | Status: SHIPPED | OUTPATIENT
Start: 2023-10-30

## 2023-11-03 ENCOUNTER — HOSPITAL ENCOUNTER (OUTPATIENT)
Dept: MAMMOGRAPHY | Facility: HOSPITAL | Age: 58
Discharge: HOME | End: 2023-11-03
Admitting: PHYSICIAN ASSISTANT
Payer: COMMERCIAL

## 2023-11-03 DIAGNOSIS — Z12.31 ENCOUNTER FOR SCREENING MAMMOGRAM FOR MALIGNANT NEOPLASM OF BREAST: ICD-10-CM

## 2023-11-03 PROCEDURE — 77067 SCR MAMMO BI INCL CAD: CPT

## 2023-11-03 PROCEDURE — 77063 BREAST TOMOSYNTHESIS BI: CPT

## 2023-11-14 DIAGNOSIS — E78.2 MIXED HYPERLIPIDEMIA: ICD-10-CM

## 2023-11-14 RX ORDER — ATORVASTATIN CALCIUM 40 MG/1
40 TABLET, FILM COATED ORAL NIGHTLY
Qty: 90 TABLET | Refills: 1 | Status: SHIPPED | OUTPATIENT
Start: 2023-11-14

## 2023-11-14 NOTE — TELEPHONE ENCOUNTER
Rx Refill Note  Requested Prescriptions     Pending Prescriptions Disp Refills    atorvastatin (LIPITOR) 40 MG tablet 90 tablet 1     Sig: Take 1 tablet by mouth Every Night.      Last office visit with prescribing clinician: 9/22/2023   Last telemedicine visit with prescribing clinician: Visit date not found   Next office visit with prescribing clinician: 12/29/2023                         Would you like a call back once the refill request has been completed: [] Yes [x] No    If the office needs to give you a call back, can they leave a voicemail: [] Yes [x] No    Luciano Ecsoto MA  11/14/23, 11:36 EST

## 2023-11-21 DIAGNOSIS — E53.8 FOLATE DEFICIENCY: ICD-10-CM

## 2023-11-21 RX ORDER — FOLIC ACID 1 MG/1
TABLET ORAL
Qty: 30 TABLET | Refills: 0 | Status: SHIPPED | OUTPATIENT
Start: 2023-11-21

## 2023-11-21 NOTE — TELEPHONE ENCOUNTER
Rx Refill Note  Requested Prescriptions     Pending Prescriptions Disp Refills    folic acid (FOLVITE) 1 MG tablet [Pharmacy Med Name: FOLIC ACID 1 MG TABLET] 30 tablet 0     Sig: TAKE 1 TABLET BY MOUTH EVERY DAY      Last office visit with prescribing clinician: 9/22/2023   Last telemedicine visit with prescribing clinician: Visit date not found   Next office visit with prescribing clinician: 12/29/2023                         Would you like a call back once the refill request has been completed: [] Yes [] No    If the office needs to give you a call back, can they leave a voicemail: [] Yes [] No    Angelika Pal MA  11/21/23, 07:57 EST

## 2023-11-25 DIAGNOSIS — R10.816 EPIGASTRIC ABDOMINAL TENDERNESS WITHOUT REBOUND TENDERNESS: ICD-10-CM

## 2023-11-27 RX ORDER — FAMOTIDINE 20 MG/1
TABLET, FILM COATED ORAL
Qty: 60 TABLET | Refills: 1 | Status: SHIPPED | OUTPATIENT
Start: 2023-11-27

## 2023-11-27 NOTE — TELEPHONE ENCOUNTER
Rx Refill Note  Requested Prescriptions     Pending Prescriptions Disp Refills    famotidine (PEPCID) 20 MG tablet [Pharmacy Med Name: FAMOTIDINE 20 MG TABLET] 60 tablet 1     Sig: TAKE 1 TABLET BY MOUTH TWICE A DAY      Last office visit with prescribing clinician: 9/22/2023     Next office visit with prescribing clinician: 12/29/2023   Kenisha Giraldo MA  11/27/23, 09:45 EST

## 2023-12-28 DIAGNOSIS — E53.8 FOLATE DEFICIENCY: ICD-10-CM

## 2023-12-28 RX ORDER — FOLIC ACID 1 MG/1
TABLET ORAL
Qty: 30 TABLET | Refills: 0 | Status: SHIPPED | OUTPATIENT
Start: 2023-12-28

## 2023-12-28 NOTE — TELEPHONE ENCOUNTER
Rx Refill Note  Requested Prescriptions     Pending Prescriptions Disp Refills    folic acid (FOLVITE) 1 MG tablet [Pharmacy Med Name: FOLIC ACID 1 MG TABLET] 30 tablet 0     Sig: TAKE 1 TABLET BY MOUTH EVERY DAY      Last office visit with prescribing clinician: 9/22/2023   Last telemedicine visit with prescribing clinician: Visit date not found   Next office visit with prescribing clinician: 12/29/2023                         Would you like a call back once the refill request has been completed: [] Yes [] No    If the office needs to give you a call back, can they leave a voicemail: [] Yes [] No    Luciano Escoto MA  12/28/23, 12:53 EST

## 2023-12-29 ENCOUNTER — OFFICE VISIT (OUTPATIENT)
Dept: FAMILY MEDICINE CLINIC | Facility: CLINIC | Age: 58
End: 2023-12-29
Payer: COMMERCIAL

## 2023-12-29 VITALS
SYSTOLIC BLOOD PRESSURE: 130 MMHG | HEART RATE: 68 BPM | OXYGEN SATURATION: 98 % | WEIGHT: 183.6 LBS | BODY MASS INDEX: 30.59 KG/M2 | HEIGHT: 65 IN | DIASTOLIC BLOOD PRESSURE: 82 MMHG | RESPIRATION RATE: 14 BRPM

## 2023-12-29 DIAGNOSIS — G62.9 NEUROPATHY: Primary | ICD-10-CM

## 2023-12-29 DIAGNOSIS — Z23 IMMUNIZATION DUE: ICD-10-CM

## 2023-12-29 DIAGNOSIS — R73.03 PREDIABETES: ICD-10-CM

## 2023-12-29 DIAGNOSIS — Z51.81 ENCOUNTER FOR THERAPEUTIC DRUG LEVEL MONITORING: ICD-10-CM

## 2023-12-29 DIAGNOSIS — K13.0 CHEILITIS: ICD-10-CM

## 2023-12-29 LAB
EXPIRATION DATE: ABNORMAL
HBA1C MFR BLD: 6.1 % (ref 4.5–5.7)
Lab: ABNORMAL

## 2023-12-29 RX ORDER — TRIAMCINOLONE ACETONIDE 1 MG/G
1 OINTMENT TOPICAL 2 TIMES DAILY
Qty: 30 G | Refills: 0 | Status: SHIPPED | OUTPATIENT
Start: 2023-12-29

## 2023-12-29 RX ORDER — GABAPENTIN 300 MG/1
300 CAPSULE ORAL NIGHTLY
Qty: 30 CAPSULE | Refills: 2 | Status: SHIPPED | OUTPATIENT
Start: 2023-12-29

## 2023-12-29 NOTE — TELEPHONE ENCOUNTER
Called and spoke with pt and gave the pt the information that was given and she verbalized an understanding.   .

## 2023-12-29 NOTE — PROGRESS NOTES
Chief Complaint   Patient presents with    Dry Mouth    Knee Pain     Both knee pain  x2 weeks    Prediabetes       Nae Sanchez is a pleasant 58 y.o. female who is here for prediabetes, bilateral knee pain, neuropathy and cheilitis.  Patient is compliant on all medications.  Taking gabapentin nightly and this helps with neuropathy.  Denies any side effects with medications.  On Jardiance for kidney function.  Reports worsening bilateral knee pain over the last 2 weeks.  Started new job where she has to climb ladder and has noticed pain since.  No swelling of joint.  Managing conservatively.  She also reports cheilitis.  Using chapstick without any improvement.    Past Medical History:   Diagnosis Date    ADHD (attention deficit hyperactivity disorder)     Anxiety     CHF (congestive heart failure) 2022    Coronary artery disease 2022    Depression     Diverticulosis     Heart murmur 1965    Hypertension 2022    Myocardial infarction 2022    Osteopenia     Peptic ulceration     Renal insufficiency     Sleep apnea  noticed    Tobacco use 2022       Past Surgical History:   Procedure Laterality Date    CARDIAC CATHETERIZATION N/A 2022    Procedure: Left Heart Cath;  Surgeon: Greta Sanchez MD;  Location: [facility]ATH [facility]ASIVE LOCATION;  Service: Cardiology;  Laterality: N/A;     SECTION  1997    COLONOSCOPY  2019    COLONOSCOPY N/A 01/10/2023    Procedure: COLONOSCOPY;  Surgeon: Rebel De Paz MD;  Location:  [facility];  Service: Gastroenterology;  Laterality: N/A;    FOOT SURGERY Left     FRACTURE SURGERY  21    Left foot    TUBAL ABDOMINAL LIGATION  97       Family History   Problem Relation Age of Onset    Anxiety disorder Mother     Hypertension Father     Diabetes Father     No Known Problems Sister     Heart disease Brother     Early death Brother     Mental illness Daughter     Breast cancer Neg Hx   "      Social History     Socioeconomic History    Marital status:    Tobacco Use    Smoking status: Former     Packs/day: 2.00     Years: 40.00     Additional pack years: 0.00     Total pack years: 80.00     Types: Cigarettes     Start date: 1982     Quit date: 2022     Years since quittin.3     Passive exposure: Never    Smokeless tobacco: Former     Quit date: 2022   Vaping Use    Vaping Use: Never used   Substance and Sexual Activity    Alcohol use: No    Drug use: No    Sexual activity: Not Currently     Partners: Male     Birth control/protection: Tubal ligation       No Known Allergies    ROS  Review of Systems   Constitutional:  Negative for chills and fever.   Respiratory:  Negative for cough and shortness of breath.    Cardiovascular:  Negative for chest pain, palpitations and leg swelling.   Musculoskeletal:  Positive for arthralgias.   Neurological:  Negative for dizziness and headache.       Vitals:    23 0947   BP: 130/82   BP Location: Left arm   Patient Position: Sitting   Cuff Size: Adult   Pulse: 68   Resp: 14   SpO2: 98%   Weight: 83.3 kg (183 lb 9.6 oz)   Height: 165.1 cm (65\")   PainSc: 8  Comment: Pt stated 7   PainLoc: Knee     Body mass index is 30.55 kg/m².           Current Outpatient Medications on File Prior to Visit   Medication Sig Dispense Refill    aspirin 81 MG EC tablet Take 1 tablet by mouth Daily. 30 tablet 6    atorvastatin (LIPITOR) 40 MG tablet Take 1 tablet by mouth Every Night. 90 tablet 1    Calcium Carbonate-Vitamin D (Oyster Shell Calcium/D) 500-5 MG-MCG tablet Take 1 tablet by mouth Daily.      carvedilol (COREG) 12.5 MG tablet TAKE 1.5 TABLETS BY MOUTH 2 (TWO) TIMES A DAY WITH MEALS  DOSES. 270 tablet 1    famotidine (PEPCID) 20 MG tablet TAKE 1 TABLET BY MOUTH TWICE A DAY 60 tablet 1    folic acid (FOLVITE) 1 MG tablet TAKE 1 TABLET BY MOUTH EVERY DAY 30 tablet 0    Jardiance 10 MG tablet tablet TAKE 1 TABLET BY MOUTH EVERY DAY 90 " tablet 1    mirtazapine (REMERON) 15 MG tablet Take 1 tablet by mouth Daily.      mupirocin (BACTROBAN) 2 % ointment Apply 1 application  topically to the appropriate area as directed 2 (Two) Times a Day. 30 g 0    torsemide (DEMADEX) 5 MG tablet TAKE 1 TABLET BY MOUTH DAILY AS NEEDED (WEIGHT GAIN OF 3LB OR INCREASED LEG SWELLING). 30 tablet 6    valsartan (DIOVAN) 40 MG tablet TAKE 1 TABLET BY MOUTH EVERY DAY 90 tablet 2    Vyvanse 50 MG capsule Take 1 capsule by mouth Every Morning      [DISCONTINUED] gabapentin (NEURONTIN) 300 MG capsule Take 1 capsule by mouth Every Night. 30 capsule 3     No current facility-administered medications on file prior to visit.       Results for orders placed or performed in visit on 12/29/23   POC Glycosylated Hemoglobin (Hb A1C)    Specimen: Blood   Result Value Ref Range    Hemoglobin A1C 6.1 (A) 4.5 - 5.7 %    Lot Number [id number]     Expiration Date 07/30/2025        PE    Physical Exam  Vitals reviewed.   Constitutional:       General: She is not in acute distress.     Appearance: Normal appearance. She is well-developed. She is obese. She is not ill-appearing or diaphoretic.   HENT:      Head: Normocephalic and atraumatic.   Eyes:      Extraocular Movements: Extraocular movements intact.      Conjunctiva/sclera: Conjunctivae normal.   Pulmonary:      Effort: No respiratory distress.   Musculoskeletal:         General: Normal range of motion.      Cervical back: Normal range of motion.   Neurological:      General: No focal deficit present.      Mental Status: She is alert.   Psychiatric:         Attention and Perception: She is attentive.         Mood and Affect: Mood normal.         Speech: Speech normal.         Behavior: Behavior normal. Behavior is cooperative.         Thought Content: Thought content normal.         Judgment: Judgment normal.           A/P    Diagnoses and all orders for this visit:    1. Neuropathy (Primary)  -     gabapentin (NEURONTIN) 300 MG  capsule; Take 1 capsule by mouth Every Night.  Dispense: 30 capsule; Refill: 2  Taking gabapentin nightly and this helps with neuropathy.  Getting good benefit.  Denies any adverse effects.  Will update UDS, CSC and logan.  Return in 3 months.    2. Prediabetes  -     POC Glycosylated Hemoglobin (Hb A1C)  Hemoglobin AIC is 6.1%, previous hemoglobin AIC was 6%.  On Jardiance for kidney dysfunction.    3. Cheilitis  -     triamcinolone (KENALOG) 0.1 % ointment; Apply 1 application  topically to the appropriate area as directed 2 (Two) Times a Day.  Dispense: 30 g; Refill: 0  Trial of steroid ointment twice daily for 10 days.  May also use vaseline.  Stop using chap stick.    4. Encounter for therapeutic drug level monitoring  -     Compliance Drug Analysis, Ur - Urine, Clean Catch    5. Immunization due  -     Fluzone >6 Months ([id number])         Plan of care reviewed with patient at the conclusion of today's visit. Education was provided regarding diagnosis, management and any prescribed or recommended OTC medications.  Patient verbalizes understanding of and agreement with management plan.    Dictated Utilizing Dragon Dictation     Please note that portions of this note were completed with a voice recognition program.     Part of this note may be an electronic transcription/translation of spoken language to printed text using the Dragon Dictation System.    Return in about 3 months (around 3/29/2024) for Recheck, ISAC.     Sandee Keen PA-C  Answers submitted by the patient for this visit:  Other (Submitted on 12/29/2023)  Please describe your symptoms.: Knee pain  Have you had these symptoms before?: Yes  How long have you been having these symptoms?: 1-2 weeks  Please describe any probable cause for these symptoms. : Work  Primary Reason for Visit (Submitted on 12/29/2023)  What is the primary reason for your visit?: Other

## 2024-01-04 ENCOUNTER — HOSPITAL ENCOUNTER (OUTPATIENT)
Dept: MAMMOGRAPHY | Facility: HOSPITAL | Age: 59
Discharge: HOME | End: 2024-01-04
Admitting: RADIOLOGY
Payer: COMMERCIAL

## 2024-01-04 DIAGNOSIS — R92.8 ABNORMAL MAMMOGRAM: ICD-10-CM

## 2024-01-04 LAB — DRUGS UR: NORMAL

## 2024-01-04 PROCEDURE — G0279 TOMOSYNTHESIS, MAMMO: HCPCS

## 2024-01-04 PROCEDURE — 77065 DX MAMMO INCL CAD UNI: CPT

## 2024-01-30 DIAGNOSIS — R10.816 EPIGASTRIC ABDOMINAL TENDERNESS WITHOUT REBOUND TENDERNESS: ICD-10-CM

## 2024-01-31 RX ORDER — FAMOTIDINE 20 MG/1
TABLET, FILM COATED ORAL
Qty: 180 TABLET | Refills: 1 | Status: SHIPPED | OUTPATIENT
Start: 2024-01-31

## 2024-02-02 DIAGNOSIS — E53.8 FOLATE DEFICIENCY: ICD-10-CM

## 2024-02-02 RX ORDER — FOLIC ACID 1 MG/1
TABLET ORAL
Qty: 90 TABLET | Refills: 1 | Status: SHIPPED | OUTPATIENT
Start: 2024-02-02

## 2024-02-07 ENCOUNTER — OFFICE VISIT (OUTPATIENT)
Dept: CARDIOLOGY | Facility: CLINIC | Age: 59
End: 2024-02-07
Payer: COMMERCIAL

## 2024-02-07 VITALS
HEIGHT: 65 IN | SYSTOLIC BLOOD PRESSURE: 135 MMHG | DIASTOLIC BLOOD PRESSURE: 86 MMHG | WEIGHT: 185.4 LBS | HEART RATE: 89 BPM | BODY MASS INDEX: 30.89 KG/M2 | OXYGEN SATURATION: 95 %

## 2024-02-07 DIAGNOSIS — E78.2 MIXED HYPERLIPIDEMIA: ICD-10-CM

## 2024-02-07 DIAGNOSIS — N28.9 RENAL INSUFFICIENCY: ICD-10-CM

## 2024-02-07 DIAGNOSIS — I50.22 CHRONIC SYSTOLIC HEART FAILURE: Primary | ICD-10-CM

## 2024-02-07 DIAGNOSIS — I10 PRIMARY HYPERTENSION: ICD-10-CM

## 2024-02-07 DIAGNOSIS — I42.0 DILATED CARDIOMYOPATHY: ICD-10-CM

## 2024-02-07 RX ORDER — TIOTROPIUM BROMIDE AND OLODATEROL 3.124; 2.736 UG/1; UG/1
2 SPRAY, METERED RESPIRATORY (INHALATION) DAILY
COMMUNITY
Start: 2024-01-05 | End: 2024-04-04

## 2024-02-07 RX ORDER — DEXTROAMPHETAMINE SACCHARATE, AMPHETAMINE ASPARTATE MONOHYDRATE, DEXTROAMPHETAMINE SULFATE AND AMPHETAMINE SULFATE 6.25; 6.25; 6.25; 6.25 MG/1; MG/1; MG/1; MG/1
25 CAPSULE, EXTENDED RELEASE ORAL EVERY MORNING
COMMUNITY
Start: 2024-01-23

## 2024-02-07 RX ORDER — CARVEDILOL 12.5 MG/1
18.75 TABLET ORAL 2 TIMES DAILY WITH MEALS
Qty: 270 TABLET | Refills: 3 | Status: SHIPPED | OUTPATIENT
Start: 2024-02-07 | End: 2025-02-01

## 2024-02-07 NOTE — PROGRESS NOTES
Established Patient Office Visit    Patient Name: Nae Sanchez  : 1965   MRN: [id number]   Care Team: Patient Care Team:  Sandee Keen PA-C as PCP - General (Physician Assistant)  Petey Leigh MD as Cardiologist (Cardiology)  Joshua Brown APRN (Nurse Practitioner)  Cecy Coles MD (Nephrology)    Chief Complaint   Patient presents with    NICM     NICM (nonischemic cardiomyopathy)         HPI: Nae Sanchez is a 58 y.o. female with a history of previous tobacco use, COPD, and nonischemic, dilated cardiomyopathy, resolved apical thrombus who presents today for routine follow-up.  Since her last visit with me in 2023 she reports feeling well from a cardiac standpoint with no chest pain, PND, orthopnea, leg swelling.  She has been having increasing knee pain in her left knee which has been associated with swelling at times.  She has not been taking her torsemide daily and has available as needed.  She has been checking her blood pressure at home with readings largely in the 130s.  She had undergone sleep study fairly recently through [facility] and does have a CPAP machine at home however is only been using this for about a week to week and a half.    Subjective   Review of Systems   Constitutional:  Negative for activity change and fatigue.   Respiratory:  Positive for shortness of breath.    Cardiovascular:  Negative for chest pain, palpitations and leg swelling.   Musculoskeletal:  Positive for arthralgias and joint swelling.       Social History     Tobacco Use   Smoking Status Former    Packs/day: 1.00    Years: 40.00    Additional pack years: 0.00    Total pack years: 40.00    Types: Cigarettes    Start date: 1982    Quit date: 2022    Years since quittin.4    Passive exposure: Never   Smokeless Tobacco Former    Quit date: 2022      No Known Allergies      Current Outpatient Medications:     amphetamine-dextroamphetamine XR (ADDERALL XR) 25 MG  24 hr capsule, Take 1 capsule by mouth Every Morning, Disp: , Rfl:     aspirin 81 MG EC tablet, Take 1 tablet by mouth Daily., Disp: 30 tablet, Rfl: 6    atorvastatin (LIPITOR) 40 MG tablet, Take 1 tablet by mouth Every Night., Disp: 90 tablet, Rfl: 1    Calcium Carbonate-Vitamin D (Oyster Shell Calcium/D) 500-5 MG-MCG tablet, Take 1 tablet by mouth Daily., Disp: , Rfl:     famotidine (PEPCID) 20 MG tablet, TAKE 1 TABLET BY MOUTH TWICE A DAY, Disp: 180 tablet, Rfl: 1    folic acid (FOLVITE) 1 MG tablet, TAKE 1 TABLET BY MOUTH EVERY DAY, Disp: 90 tablet, Rfl: 1    gabapentin (NEURONTIN) 300 MG capsule, Take 1 capsule by mouth Every Night., Disp: 30 capsule, Rfl: 2    Jardiance 10 MG tablet tablet, TAKE 1 TABLET BY MOUTH EVERY DAY, Disp: 90 tablet, Rfl: 1    mirtazapine (REMERON) 15 MG tablet, Take 1 tablet by mouth Daily., Disp: , Rfl:     mupirocin (BACTROBAN) 2 % ointment, Apply 1 application  topically to the appropriate area as directed 2 (Two) Times a Day., Disp: 30 g, Rfl: 0    Stiolto Respimat 2.5-2.5 MCG/ACT aerosol solution inhaler, Inhale 2 puffs Daily., Disp: , Rfl:     valsartan (DIOVAN) 40 MG tablet, TAKE 1 TABLET BY MOUTH EVERY DAY, Disp: 90 tablet, Rfl: 2    carvedilol (COREG) 12.5 MG tablet, TAKE 1.5 TABLETS BY MOUTH 2 (TWO) TIMES A DAY WITH MEALS  DOSES., Disp: 270 tablet, Rfl: 1    torsemide (DEMADEX) 5 MG tablet, TAKE 1 TABLET BY MOUTH DAILY AS NEEDED (WEIGHT GAIN OF 3LB OR INCREASED LEG SWELLING). (Patient not taking: Reported on 2/7/2024), Disp: 30 tablet, Rfl: 6    triamcinolone (KENALOG) 0.1 % ointment, Apply 1 application  topically to the appropriate area as directed 2 (Two) Times a Day. (Patient not taking: Reported on 2/7/2024), Disp: 30 g, Rfl: 0    Vyvanse 50 MG capsule, Take 1 capsule by mouth Every Morning (Patient not taking: Reported on 2/7/2024), Disp: , Rfl:       Objective     Vitals:    02/07/24 1053 02/07/24 1151   BP: 154/100 135/86   BP Location: Left arm    Patient  "Position: Sitting    Pulse: 89    SpO2: 95%    Weight: 84.1 kg (185 lb 6.4 oz)    Height: 165.1 cm (65\")      Body mass index is 30.85 kg/m².  Gen: well developed, white female, sitting up on exam table  HEENT: MMM, sclera anicteric, conjunctiva normal  CV: regular rate, regular rhythm, no murmurs or rubs, normal S1, S2. 2+ radial pulses  Pulm: RA, normal work of breathing, no wheezes, rales, rhonchi  Ext: normal bulk for age, normal tone, no dependent edema, left knee with mild crepitus on motion, no significant effusion on exam  Neuro: alert, oriented, face symmetrical, moving all extremities well  Psych: normal mood, appropriate affect    RESULTS:     [facility] 9/1/2022  FINAL IMPRESSION:  Angulated/tortuous proximal LCx with nonocclusive (up to 50%) plaque with normal IFR of 1.0.  Angiographically near normal left main, the LAD and the dominant RCA.  Dilated, probably nonischemic cardiomyopathy with severe left ventricular systolic dysfunction, estimated ejection fraction 30%.  Left ventricular apical filling defect highly suggestive of apical thrombus.    12/6/22 - Transthoracic Echo Complete W/ Cont if Necessary Per Protocol      The left ventricular cavity is mildly dilated.    Left ventricular systolic function is mildly decreased. Calculated left ventricular EF = 41%    Left ventricular diastolic function is consistent with (grade Ia w/high LAP) impaired relaxation.    EF is improved from previous.    No LV thrombus seen with LV opacification agent    8/30/22 - TTE  Estimated left ventricular EF = 25% Left ventricular ejection fraction appears to be 21 - 25%. Left ventricular systolic function is severely decreased.  There is a small left pleural effusion.  The following left ventricular wall segments are hypokinetic: mid anterior, apical anterior, basal anterolateral, mid anterolateral, apical lateral, basal inferolateral, mid inferolateral, mid anteroseptal and basal anterior. The following left ventricular " wall segments are akinetic: apical inferior, basal inferior, mid inferior, apical septal, basal inferoseptal, mid inferoseptal and apex.  The left ventricular cavity is mild to moderately dilated.  The findings are consistent with dilated cardiomyopathy.  Left ventricular diastolic function is consistent with (grade I) impaired relaxation.  Left atrial volume is mildly increased.  Estimated right ventricular systolic pressure from tricuspid regurgitation is normal (<35 mmHg).  Normal right ventricular cavity size, wall thickness and septal motion noted with moderately reduced right ventricular systolic function.  No evidence of left ventricular thrombus or mass present.  No evidence of pulmonary hypertension is present.  Mild to moderate aortic valve regurgitation is present.    Labs:  Lab Results   Component Value Date    CREATININE 2.18 (H) 06/16/2023    BUN 31 (H) 06/16/2023     06/16/2023    K 4.6 06/16/2023     06/16/2023    CO2 25.5 06/16/2023     Renal function panel   10/05/23 / Cr 1.87, BUN 27, K 4.7  07/07/23 - Cr 2.23, BUN 22, K 4.2  01/12/23 - Cr 1.61, potassium 5.1, total CO2 31    Lab Results   Component Value Date    WBC 12.00 (H) 10/05/2023    HGB 13.4 10/05/2023    HCT 42.0 10/05/2023    MCV 90 10/05/2023     (H) 10/05/2023     Lab Results   Component Value Date    HGBA1C 6.1 (A) 12/29/2023     Lab Results   Component Value Date    CHOL 231 (H) 06/16/2023    TRIG 74 06/16/2023    HDL 57 06/16/2023     (H) 06/16/2023     Most recent PCP note, imaging tests, and labs reviewed.    Procedures    Assessment & Plan       ICD-10-CM ICD-9-CM   1. Chronic systolic heart failure  I50.22 428.22   2. Renal insufficiency  N28.9 593.9   3. Primary hypertension  I10 401.9   4. Mixed hyperlipidemia  E78.2 272.2   5. Dilated cardiomyopathy  I42.0 425.4     Chronic heart failure with reduced ejection fraction, EF improved to 41%  Nonischemic cardiomyopathy  LV thrombus, resolved   - GDMT  with carvedilol, valsartan, empagliflozin    - PRN torsemide    Primary hypertension, acceptably controlled   - no changes today, continue following at home   - carvedilol refilled    HLD   - continue statin    Stage III CKD   - follows with nephrology at  (Dr. Cecy Coles)    No follow-ups on file.    KAMALJIT Leigh MD, MS  02/07/24    [facility]  [address] [place] [id number] [phone]

## 2024-02-09 DIAGNOSIS — G62.9 NEUROPATHY: ICD-10-CM

## 2024-02-10 NOTE — TELEPHONE ENCOUNTER
Rx Refill Note  Requested Prescriptions     Pending Prescriptions Disp Refills    gabapentin (NEURONTIN) 300 MG capsule [Pharmacy Med Name: GABAPENTIN 300 MG CAPSULE] 30 capsule 3     Sig: TAKE 1 CAPSULE BY MOUTH EVERY DAY AT NIGHT      Last office visit with prescribing clinician: 12/29/2023   Last telemedicine visit with prescribing clinician: Visit date not found   Next office visit with prescribing clinician: 3/29/2024                         Would you like a call back once the refill request has been completed: [] Yes [] No    If the office needs to give you a call back, can they leave a voicemail: [] Yes [] No    April JOSIAH [place]  02/10/24, 11:28 EST    CSA: 12/29/2023  UDS: 12/29/2023  LOV:12/29/2023

## 2024-02-11 RX ORDER — GABAPENTIN 300 MG/1
300 CAPSULE ORAL NIGHTLY
Qty: 30 CAPSULE | Refills: 1 | Status: SHIPPED | OUTPATIENT
Start: 2024-02-11

## 2024-03-11 DIAGNOSIS — R73.03 PREDIABETES: ICD-10-CM

## 2024-03-11 DIAGNOSIS — I50.22 CHRONIC SYSTOLIC HEART FAILURE: ICD-10-CM

## 2024-03-12 RX ORDER — EMPAGLIFLOZIN 10 MG/1
TABLET, FILM COATED ORAL
Qty: 90 TABLET | Refills: 1 | Status: SHIPPED | OUTPATIENT
Start: 2024-03-12

## 2024-03-29 ENCOUNTER — OFFICE VISIT (OUTPATIENT)
Dept: FAMILY MEDICINE CLINIC | Facility: CLINIC | Age: 59
End: 2024-03-29
Payer: COMMERCIAL

## 2024-03-29 VITALS
WEIGHT: 186.8 LBS | HEART RATE: 84 BPM | DIASTOLIC BLOOD PRESSURE: 82 MMHG | OXYGEN SATURATION: 94 % | SYSTOLIC BLOOD PRESSURE: 122 MMHG | BODY MASS INDEX: 31.12 KG/M2 | RESPIRATION RATE: 14 BRPM | HEIGHT: 65 IN

## 2024-03-29 DIAGNOSIS — E11.65 TYPE 2 DIABETES MELLITUS WITH HYPERGLYCEMIA, WITHOUT LONG-TERM CURRENT USE OF INSULIN: Primary | ICD-10-CM

## 2024-03-29 DIAGNOSIS — G62.9 NEUROPATHY: ICD-10-CM

## 2024-03-29 DIAGNOSIS — R10.816 EPIGASTRIC ABDOMINAL TENDERNESS WITHOUT REBOUND TENDERNESS: ICD-10-CM

## 2024-03-29 DIAGNOSIS — I10 PRIMARY HYPERTENSION: ICD-10-CM

## 2024-03-29 DIAGNOSIS — J01.00 ACUTE NON-RECURRENT MAXILLARY SINUSITIS: ICD-10-CM

## 2024-03-29 LAB
EXPIRATION DATE: ABNORMAL
HBA1C MFR BLD: 6.1 % (ref 4.5–5.7)
Lab: ABNORMAL

## 2024-03-29 RX ORDER — DEXTROAMPHETAMINE SACCHARATE, AMPHETAMINE ASPARTATE MONOHYDRATE, DEXTROAMPHETAMINE SULFATE AND AMPHETAMINE SULFATE 7.5; 7.5; 7.5; 7.5 MG/1; MG/1; MG/1; MG/1
30 CAPSULE, EXTENDED RELEASE ORAL EVERY MORNING
COMMUNITY
Start: 2024-03-26

## 2024-03-29 RX ORDER — GABAPENTIN 300 MG/1
300 CAPSULE ORAL 2 TIMES DAILY
Qty: 60 CAPSULE | Refills: 2 | Status: SHIPPED | OUTPATIENT
Start: 2024-03-29

## 2024-03-29 RX ORDER — FAMOTIDINE 20 MG/1
20 TABLET, FILM COATED ORAL 2 TIMES DAILY
Qty: 180 TABLET | Refills: 1 | Status: SHIPPED | OUTPATIENT
Start: 2024-03-29

## 2024-03-29 RX ORDER — AZITHROMYCIN 250 MG/1
TABLET, FILM COATED ORAL
Qty: 6 TABLET | Refills: 0 | Status: SHIPPED | OUTPATIENT
Start: 2024-03-29

## 2024-03-29 NOTE — PROGRESS NOTES
Chief Complaint   Patient presents with    Follow-up     CM    Earache    Cough    Sore Throat       Nae Sanchez is a pleasant 58 y.o. female who is here for routine follow-up of diabetes type 2, neuropathy, hypertension and new onset sinusitis symptoms.    Patient is compliant on all medications.  She reports worsening neuropathy in her feet.  She experiences this mostly during the day.  Currently on gabapentin 300 mg nightly and tolerating well.    Blood pressure is stable and well-controlled.    Patient reports new onset facial pain, cough, earache and sore throat for the last 4 days.  No fever, chills, shortness of breath or productive cough.    Past Medical History:   Diagnosis Date    ADHD (attention deficit hyperactivity disorder)     Anxiety     CHF (congestive heart failure) 2022    Chronic obstructive pulmonary disease (COPD) 2022    Coronary artery disease 2022    Depression     Diverticulosis     Heart murmur 1965    Hypertension 2022    Myocardial infarction 2022    Osteopenia     Peptic ulceration     Renal insufficiency     Renal insufficiency 2022    Sleep apnea  noticed    Tobacco use 2022       Past Surgical History:   Procedure Laterality Date    CARDIAC CATHETERIZATION N/A 2022    Procedure: Left Heart Cath;  Surgeon: Greta Sanchez MD;  Location:  [facility];  Service: Cardiology;  Laterality: N/A;     SECTION  1997    COLONOSCOPY  2019    COLONOSCOPY N/A 01/10/2023    Procedure: COLONOSCOPY;  Surgeon: Rebel De Paz MD;  Location:  [facility];  Service: Gastroenterology;  Laterality: N/A;    FOOT SURGERY Left     FRACTURE SURGERY  21    Left foot    TUBAL ABDOMINAL LIGATION  97       Family History   Problem Relation Age of Onset    Anxiety disorder Mother     Hypertension Father     Diabetes Father     No Known Problems Sister     Heart disease Brother     Early  "death Brother     Mental illness Daughter     Breast cancer Neg Hx        Social History     Socioeconomic History    Marital status:    Tobacco Use    Smoking status: Former     Current packs/day: 0.00     Average packs/day: 1 pack/day for 40.7 years (40.7 ttl pk-yrs)     Types: Cigarettes     Start date: 1982     Quit date: 2022     Years since quittin.5     Passive exposure: Never    Smokeless tobacco: Former     Quit date: 2022   Vaping Use    Vaping status: Never Used   Substance and Sexual Activity    Alcohol use: No    Drug use: No    Sexual activity: Not Currently     Partners: Male     Birth control/protection: Tubal ligation       No Known Allergies    ROS  Review of Systems   Constitutional:  Positive for fatigue. Negative for chills, diaphoresis and fever.   HENT:  Positive for congestion, ear pain, sinus pressure and sore throat. Negative for postnasal drip and rhinorrhea.    Respiratory:  Positive for cough. Negative for shortness of breath and wheezing.    Cardiovascular:  Negative for chest pain and leg swelling.   Neurological:  Positive for headache. Negative for dizziness.       Vitals:    24 1121   BP: 122/82   BP Location: Left arm   Patient Position: Sitting   Cuff Size: Adult   Pulse: 84   Resp: 14   SpO2: 94%   Weight: 84.7 kg (186 lb 12.8 oz)   Height: 165.1 cm (65\")   PainSc: 0-No pain     Body mass index is 31.09 kg/m².           Current Outpatient Medications on File Prior to Visit   Medication Sig Dispense Refill    amphetamine-dextroamphetamine XR (ADDERALL XR) 25 MG 24 hr capsule Take 1 capsule by mouth Every Morning      amphetamine-dextroamphetamine XR (ADDERALL XR) 30 MG 24 hr capsule Take 1 capsule by mouth Every Morning      aspirin 81 MG EC tablet Take 1 tablet by mouth Daily. 30 tablet 6    atorvastatin (LIPITOR) 40 MG tablet Take 1 tablet by mouth Every Night. 90 tablet 1    Calcium Carbonate-Vitamin D (Oyster Shell Calcium/D) 500-5 MG-MCG tablet " Take 1 tablet by mouth Daily.      carvedilol (COREG) 12.5 MG tablet Take 1.5 tablets by mouth 2 (Two) Times a Day With Meals for 360 days. 270 tablet 3    famotidine (PEPCID) 20 MG tablet TAKE 1 TABLET BY MOUTH TWICE A  tablet 1    folic acid (FOLVITE) 1 MG tablet TAKE 1 TABLET BY MOUTH EVERY DAY 90 tablet 1    Jardiance 10 MG tablet tablet TAKE 1 TABLET BY MOUTH EVERY DAY 90 tablet 1    mirtazapine (REMERON) 15 MG tablet Take 1 tablet by mouth Daily.      Stiolto Respimat 2.5-2.5 MCG/ACT aerosol solution inhaler Inhale 2 puffs Daily.      torsemide (DEMADEX) 5 MG tablet TAKE 1 TABLET BY MOUTH DAILY AS NEEDED (WEIGHT GAIN OF 3LB OR INCREASED LEG SWELLING). 30 tablet 6    triamcinolone (KENALOG) 0.1 % ointment Apply 1 application  topically to the appropriate area as directed 2 (Two) Times a Day. 30 g 0    valsartan (DIOVAN) 40 MG tablet TAKE 1 TABLET BY MOUTH EVERY DAY 90 tablet 2    [DISCONTINUED] gabapentin (NEURONTIN) 300 MG capsule TAKE 1 CAPSULE BY MOUTH EVERY DAY AT NIGHT 30 capsule 1    mupirocin (BACTROBAN) 2 % ointment Apply 1 application  topically to the appropriate area as directed 2 (Two) Times a Day. 30 g 0    [DISCONTINUED] Vyvanse 50 MG capsule Take 1 capsule by mouth Every Morning (Patient not taking: Reported on 2/7/2024)       No current facility-administered medications on file prior to visit.       Results for orders placed or performed in visit on 03/29/24   POC Glycosylated Hemoglobin (Hb A1C)    Specimen: Blood   Result Value Ref Range    Hemoglobin A1C 6.1 (A) 4.5 - 5.7 %    Lot Number [id number]     Expiration Date 11/27/2025        PE    Physical Exam  Vitals reviewed.   Constitutional:       General: She is not in acute distress.     Appearance: She is well-developed. She is ill-appearing. She is not diaphoretic.   HENT:      Head: Normocephalic and atraumatic.      Right Ear: Hearing, tympanic membrane, ear canal and external ear normal.      Left Ear: Hearing, tympanic membrane,  ear canal and external ear normal.      Nose: Nose normal.      Right Sinus: Maxillary sinus tenderness present. No frontal sinus tenderness.      Left Sinus: Maxillary sinus tenderness present. No frontal sinus tenderness.      Mouth/Throat:      Pharynx: Uvula midline. No posterior oropharyngeal erythema.   Eyes:      Conjunctiva/sclera: Conjunctivae normal.   Cardiovascular:      Rate and Rhythm: Normal rate and regular rhythm.      Heart sounds: Normal heart sounds. No murmur heard.     No friction rub. No gallop.   Pulmonary:      Effort: Pulmonary effort is normal. No respiratory distress.      Breath sounds: Normal breath sounds.   Musculoskeletal:      Cervical back: Normal range of motion.   Skin:     General: Skin is warm.      Findings: No erythema.   Neurological:      Mental Status: She is alert and oriented to person, place, and time.   Psychiatric:         Behavior: Behavior normal.         Thought Content: Thought content normal.         Judgment: Judgment normal.           A/P    Diagnoses and all orders for this visit:    1. Type 2 diabetes mellitus with hyperglycemia, without long-term current use of insulin (Primary)  -     POC Glycosylated Hemoglobin (Hb A1C)  Hemoglobin A1C was 6.1%, hemoglobin A1C is 6.1%.  Compliant on medications.  Return in 3 months.    2. Neuropathy  -     gabapentin (NEURONTIN) 300 MG capsule; Take 1 capsule by mouth 2 (Two) Times a Day.  Dispense: 60 capsule; Refill: 2  Worsening neuropathy mostly in the day.  Will trial increase in gabapentin to 300 mg twice daily.  Tolerating medicine well.  UDS and CSC up-to-date.  Return in 3 months.    3. Acute non-recurrent maxillary sinusitis  -     azithromycin (Zithromax Z-Chong) 250 MG tablet; Take 2 tablets the first day, then 1 tablet daily for 4 days.  Dispense: 6 tablet; Refill: 0    4. Primary hypertension  Stable, well-controlled.  Compliant on medication.         Plan of care reviewed with patient at the conclusion of  today's visit. Education was provided regarding diagnosis, management and any prescribed or recommended OTC medications.  Patient verbalizes understanding of and agreement with management plan.    Dictated Utilizing Dragon Dictation     Please note that portions of this note were completed with a voice recognition program.     Part of this note may be an electronic transcription/translation of spoken language to printed text using the Dragon Dictation System.    Return in about 3 months (around 6/29/2024) for Annual physical.     Sandee Keen PA-C  Answers submitted by the patient for this visit:  Other (Submitted on 3/29/2024)  Please describe your symptoms.: Follow up cough sore throat earache  Have you had these symptoms before?: Yes  How long have you been having these symptoms?: 1-4 days  Please list any medications you are currently taking for this condition.: NyQuil and DayQuil  Please describe any probable cause for these symptoms. : Cold  Primary Reason for Visit (Submitted on 3/29/2024)  What is the primary reason for your visit?: Other

## 2024-05-14 NOTE — PROGRESS NOTES
[facility] Medicine Services  PROGRESS NOTE    Patient Name: Nae Sanchez  : 1965  MRN: [id number]    Date of Admission: 2022  Primary Care Physician: Mounika Chatman APRN    Subjective   Subjective     CC:  New onset heart failure    HPI:  Patient is doing well this morning.  Denies any shortness of breath or chest pain.  No lower extremity swelling.    ROS:  General: denies fevers or chills  CV: denies chest pain  Resp: denies shortness of breath  Abd: denies abd pain, nausea      Objective   Objective     Vital Signs:   Temp:  [97.4 °F (36.3 °C)-98 °F (36.7 °C)] 98 °F (36.7 °C)  Heart Rate:  [73-85] 80  Resp:  [16-18] 16  BP: ()/(60-88) 117/72     Physical Exam:  Constitutional: No acute distress, awake, alert, sitting upright on bedside  Respiratory: Clear to auscultation bilaterally, respiratory effort normal on room air  Cardiovascular: RRR, no murmurs, rubs, or gallops  Gastrointestinal: Positive bowel sounds, soft, nontender, nondistended  Musculoskeletal: No bilateral ankle edema  Psychiatric: Appropriate affect, cooperative  Neurologic: Oriented x 3, no focal deficits  Skin: No rashes      Results Reviewed:  LAB RESULTS:      Lab 22  0347 22  1008 22  0518 22  0737   WBC 12.36* 15.82* 17.27* 18.37*   HEMOGLOBIN 11.7* 12.3 12.4 13.2   HEMATOCRIT 36.4 36.8 38.6 40.0   PLATELETS 347 333 403 504*   NEUTROS ABS 8.71* 11.84*  --  14.70*   IMMATURE GRANS (ABS) 0.12* 0.15*  --  0.20*   LYMPHS ABS 2.31 2.40  --  2.01   MONOS ABS 0.90 1.03*  --  1.11*   EOS ABS 0.21 0.27  --  0.19   MCV 84.3 81.4 83.5 83.5   PROCALCITONIN  --   --   --  0.05   LACTATE  --   --   --  1.8         Lab 22  0417 22  0347 22  1008 22  1639 22  0518 22  0748 22  0746 22  0737   SODIUM 135* 137 142  --  143  --   --  140   POTASSIUM 4.4 3.5 3.6  --  3.4*  --   --  4.1   CHLORIDE 96* 94* 97*  --  101  --   --  102  [facility] PRE-ADMISSION TESTING INSTRUCTIONS    The Preadmission Testing patient is instructed accordingly using the following criteria (check applicable):    ARRIVAL INSTRUCTIONS:  [x] Parking the day of Surgery is located in the Main Entrance lot.  Upon entering the door, make an immediate right to the surgery reception desk    [x] Bring photo ID and insurance card    [x] Bring in a copy of Living will or Durable Power of  papers.    [x] Please be sure to arrange for a responsible adult to provide transportation to and from the hospital    [x] Please arrange for a responsible adult to be with you for the 24 hour period post procedure due to having anesthesia    [x] If you awake am of surgery not feeling well or have temperature >100 please call [phone]    GENERAL INSTRUCTIONS:    [x] No solid foods after midnight, may have up to 8oz of water until 2 hours prior to arrival time. No gum, no candy, no mints.        [x] You may brush your teeth, do not swallow any toothpaste    [x] Take medications as instructed     [x] Stop herbal supplements and vitamins prior to procedure    [] Follow preop dosing of blood thinners per physician instructions    [] Take 1/2 dose of evening insulin, but no insulin after midnight    [] No oral diabetic medications after midnight    [] If diabetic and have low blood sugar or feel symptomatic, take 1-2oz apple juice only    [] Bring inhalers day of surgery    [x] Bring urine specimen day of surgery    [x] Shower or bath with soap, lather and rinse well, AM of Surgery, no lotion, powders or creams to surgical site    [] Follow bowel prep as instructed per surgeon    [x] No tobacco products within 24 hours of surgery     [x] No alcohol or illegal drug use, marijuana included, within 24 hours of surgery.    [x] Jewelry, body piercing's, eyeglasses, contact lenses and dentures are not permitted into surgery (bring cases)      [x] Please do not wear    CO2 31.0* 32.0* 33.0*  --  33.0*  --   --  23.0   ANION GAP 8.0 11.0 12.0  --  9.0  --   --  15.0   BUN 25* 23* 23*  --  18  --   --  13   CREATININE 1.42* 1.41* 1.23*  --  1.32* 1.20   < > 1.15*   EGFR 43.2* 43.6* 51.4*  --  47.2*  --   --  55.7*   GLUCOSE 134* 123* 99  --  107*  --   --  127*   CALCIUM 8.6 8.1* 8.6  --  8.4*  --   --  8.9   MAGNESIUM 2.5 1.6 1.6 1.7 1.8  --   --   --    PHOSPHORUS 2.7  --  3.5  --   --   --   --   --    HEMOGLOBIN A1C  --   --   --   --  5.70*  --   --   --    TSH  --   --   --   --  1.380  --   --  3.530    < > = values in this interval not displayed.         Lab 09/03/22  0417 09/02/22  0347 09/01/22  1008 08/31/22  0518 08/30/22  0737   TOTAL PROTEIN  --  5.8* 6.2 5.8* 6.6   ALBUMIN 3.50 3.10* 3.70 3.40* 3.90   GLOBULIN  --  2.7 2.5 2.4 2.7   ALT (SGPT)  --  26 33 37* 42*   AST (SGOT)  --  37* 54* 90* 75*   BILIRUBIN  --  0.7 1.0 0.9 1.5*   ALK PHOS  --  134* 144* 147* 170*         Lab 08/31/22  0518 08/30/22  0737   PROBNP  --  49,288.0*   TROPONIN T 0.983* 0.551*         Lab 08/31/22  0518   CHOLESTEROL 112   LDL CHOL 66   HDL CHOL 30*   TRIGLYCERIDES 82         Lab 08/30/22  0737   VITAMIN B 12 521         Brief Urine Lab Results  (Last result in the past 365 days)      Color   Clarity   Blood   Leuk Est   Nitrite   Protein   CREAT   Urine HCG        08/30/22 1037 Yellow   Clear   Negative   Negative   Negative   Trace                 Microbiology Results Abnormal     Procedure Component Value - Date/Time    Blood Culture - Blood, Arm, Right [[id number]]  (Normal) Collected: 08/30/22 0915    Lab Status: Preliminary result Specimen: Blood from Arm, Right Updated: 09/02/22 1017     Blood Culture No growth at 3 days    Blood Culture - Blood, Hand, Right [[id number]]  (Normal) Collected: 08/30/22 0915    Lab Status: Preliminary result Specimen: Blood from Hand, Right Updated: 09/02/22 1016     Blood Culture No growth at 3 days    COVID PRE-OP / PRE-PROCEDURE SCREENING ORDER (NO  ISOLATION) - Swab, Nasopharynx [[id number]]  (Normal) Collected: 08/30/22 0918    Lab Status: Final result Specimen: Swab from Nasopharynx Updated: 08/30/22 1046    Narrative:      The following orders were created for panel order COVID PRE-OP / PRE-PROCEDURE SCREENING ORDER (NO ISOLATION) - Swab, Nasopharynx.  Procedure                               Abnormality         Status                     ---------                               -----------         ------                     Respiratory Panel PCR w/...[[id number]]  Normal              Final result                 Please view results for these tests on the individual orders.    Respiratory Panel PCR w/COVID-19(SARS-CoV-2) SIDNEY/GERA/CAMRON/PAD/COR/MAD/CITLALI In-House, NP Swab in UTM/VTM, 3-4 HR TAT - Swab, Nasopharynx [[id number]]  (Normal) Collected: 08/30/22 0918    Lab Status: Final result Specimen: Swab from Nasopharynx Updated: 08/30/22 1046     ADENOVIRUS, PCR Not Detected     Coronavirus 229E Not Detected     Coronavirus HKU1 Not Detected     Coronavirus NL63 Not Detected     Coronavirus OC43 Not Detected     COVID19 Not Detected     Human Metapneumovirus Not Detected     Human Rhinovirus/Enterovirus Not Detected     Influenza A PCR Not Detected     Influenza B PCR Not Detected     Parainfluenza Virus 1 Not Detected     Parainfluenza Virus 2 Not Detected     Parainfluenza Virus 3 Not Detected     Parainfluenza Virus 4 Not Detected     RSV, PCR Not Detected     Bordetella pertussis pcr Not Detected     Bordetella parapertussis PCR Not Detected     Chlamydophila pneumoniae PCR Not Detected     Mycoplasma pneumo by PCR Not Detected    Narrative:      In the setting of a positive respiratory panel with a viral infection PLUS a negative procalcitonin without other underlying concern for bacterial infection, consider observing off antibiotics or discontinuation of antibiotics and continue supportive care. If the respiratory panel is positive for atypical bacterial  infection (Bordetella pertussis, Chlamydophila pneumoniae, or Mycoplasma pneumoniae), consider antibiotic de-escalation to target atypical bacterial infection.          Cardiac Catheterization/Vascular Study    Result Date: 9/1/2022  FINAL     Impression: · Angulated/tortuous proximal circumflex with nonocclusive (up to 50%) plaque with normal IFR of 1.0. · Angiographically near normal left main, the LAD and the dominant RCA. · Dilated, probably nonischemic cardiomyopathy with severe left ventricular systolic dysfunction, estimated ejection fraction 30%. · Left ventricular apical filling defect highly suggestive of apical thrombus. RECOMMENDATIONS: · Optimize medical therapy and risk factor management per guidelines. · Consideration of transesophageal echocardiogram for further assessment of left ventricular apical filling defect and consideration of long-term anticoagulation therapy.  Indications: Non-STEMI/cardiomyopathy.  Access: Right radial. Procedures: · Left heart catheterization. · Left ventriculogram. · Selective coronary angiography. · Arterial site hemostasis with radial band. Procedure narrative: The patient was brought to the catheterization lab in a fasting condition.  Access site was prepped and draped in standard sterile fashion.  Lidocaine was injected and arterial access was obtained by percutaneous anterior wall puncture technique.  A 6 [place] arterial sheath was placed. Above procedures were performed without complications.  At the conclusion the arterial sheath was removed and hemostasis was achieved.  The patient was transferred to the unit in a stable condition. Hemodynamic Findings: Heart Rate: 77/minute. LV pressure: 90 5/2-2012 mmHg, on pull back no gradient was recorded across the aortic valve. Angiographic Findings: Right coronary dominance. · LM: Angiographically normal. · LAD: Minor irregularities with angiographically near normal vessel. · LCX: Tortuous vessel with significant proximal  tortuosity/angulation with evidence of eccentric plaque which appears significant in some views and less than 50% in most views.  This was further assessed with IFR and the IFR of the circumflex was normal at 1.0. · RCA: Dominant vessel, angiographically normal. · LV: Left ventriculogram performed in 30 REDDY projection revealed dilated left ventricle with severe global hypokinesis and apical dyskinesis with severe left ventricular systolic dysfunction.  Estimated ejection fraction is 30%.  Apical filling defect is noted suggesting presence of a left ventricular apical thrombus.  Mild mitral regurgitation was noted. Complications: No acute procedure related complications.       Results for orders placed during the hospital encounter of 08/30/22    Adult Transthoracic Echo Complete W/ Cont if Necessary Per Protocol    Interpretation Summary  · Estimated left ventricular EF = 25% Left ventricular ejection fraction appears to be 21 - 25%. Left ventricular systolic function is severely decreased.  · There is a small left pleural effusion.  · The following left ventricular wall segments are hypokinetic: mid anterior, apical anterior, basal anterolateral, mid anterolateral, apical lateral, basal inferolateral, mid inferolateral, mid anteroseptal and basal anterior. The following left ventricular wall segments are akinetic: apical inferior, basal inferior, mid inferior, apical septal, basal inferoseptal, mid inferoseptal and apex.  · The left ventricular cavity is mild to moderately dilated.  · The findings are consistent with dilated cardiomyopathy.  · Left ventricular diastolic function is consistent with (grade I) impaired relaxation.  · Left atrial volume is mildly increased.  · Estimated right ventricular systolic pressure from tricuspid regurgitation is normal (<35 mmHg).  · Normal right ventricular cavity size, wall thickness and septal motion noted with moderately reduced right ventricular systolic function.  · No  evidence of left ventricular thrombus or mass present.  · There is no evidence of pericardial effusion.  · No evidence of pulmonary hypertension is present.  · Mild to moderate aortic valve regurgitation is present.      I have reviewed the medications:  Scheduled Meds:aspirin, 81 mg, Oral, Daily  carvedilol, 6.25 mg, Oral, BID With Meals  desvenlafaxine, 50 mg, Oral, Daily  enoxaparin, 40 mg, Subcutaneous, Daily  lisdexamfetamine, 60 mg, Oral, Daily  nicotine, 1 patch, Transdermal, Daily  pharmacy consult - MTM, , Does not apply, Daily  sodium chloride, 10 mL, Intravenous, Q12H  torsemide, 10 mg, Oral, Daily  valsartan, 40 mg, Oral, Q12H      Continuous Infusions:   PRN Meds:.•  acetaminophen  •  acetaminophen  •  acetaminophen  •  magnesium sulfate **OR** magnesium sulfate **OR** magnesium sulfate  •  nitroglycerin  •  ondansetron  •  potassium chloride  •  potassium chloride  •  sodium chloride  •  sodium chloride    Assessment & Plan   Assessment & Plan     Active Hospital Problems    Diagnosis  POA   • **Acute combined systolic (congestive) and diastolic (congestive) heart failure (HCC) [I50.41]  Yes   • Leukocytosis [D72.829]  Yes   • Elevated LFTs [R79.89]  Yes   • Renal insufficiency [N28.9]  Yes   • Tobacco use [Z72.0]  Yes   • Unintentional weight loss [R63.4]  Yes   • Elevated troponin [R77.8]  Yes   • Acute on chronic systolic congestive heart failure (HCC) [I50.23]  Unknown      Resolved Hospital Problems   No resolved problems to display.        Brief Hospital Course to date:  Nae Sanchez is a 57 y.o. female with tobacco use who presented due to dyspnea and fatigue and was found to have new congestive heart failure. Echo showed LVEF 25%, diastolic dysfunction and multiple areas of wall motion abnormalities. She was given IV diuredics Cardiology was consulted and she Underwent left heart cath on 9/1/2022 that showed nonobstructive coronary artery disease, dilated nonischemic cardiomyopathy with  severe left ventricular systolic dysfunction, left ventricular apical filling defect suggestive of apical thrombus. She was started on xarelto.       Acute combined systolic and diastolic CHF  Elevated troponin  - Continue xarelto  -Continue Coreg, aspirin, Demadex, valsartan per cardiology.  Monitor blood pressures to ensure she is tolerating this  -will need close follow-up with cardiology in 4 to 6 weeks  -Creatinine trended up slightly, repeat remained stable  -Patient will need to have LifeVest in place prior to discharge.  Evan with cardiology.  Counseled patient regarding the need for this prior to discharge.     Leukocytosis, improving   - UA not suggestive of infection  - CT chest/abdomen/pelvis without source of infection     LFT elevation, improving  - Possibly due to CHF  - Mild enlargement of CBD on ultrasound. MRCP showed dilation of 7 mm and no intrahepatic biliary dilation, focal tapered narrowing at the level of the ampulla.  Given the fact that the patient is pain-free and LFTs improving with diuresis will defer additional work-up; however, if she were to develop any symptoms this would need to be worked up further.  -improving w diuresis     Left great toe pain  -Possibly gout  -Continue colchicine     Poor appetite, weight loss  - TSH WNL     Renal insufficiency, acuity unknown  - Monitor with diuresis, improving     Tobacco use  - NRT if desired     Expected Discharge Location and Transportation: Home  Expected Discharge Date: 9/5, pending lifevest       DVT prophylaxis:  Medical DVT prophylaxis orders are present.     AM-PAC 6 Clicks Score (PT): 22 (09/03/22 0830)    CODE STATUS:   Code Status and Medical Interventions:   Ordered at: 08/30/22 0942     Level Of Support Discussed With:    Patient     Code Status (Patient has no pulse and is not breathing):    CPR (Attempt to Resuscitate)     Medical Interventions (Patient has pulse or is breathing):    Full Support     I have prepared this  progress note with copied portions of the prior day's progress note of my own authorship to preserve accuracy and maintain consistency of documentation. I have reviewed these portions and edited them for correctness. I verify that the above documentation accurately and truly represents the evaluation and management performed on today's date.       Joy Su MD  09/03/22

## 2024-05-22 DIAGNOSIS — E53.8 FOLATE DEFICIENCY: ICD-10-CM

## 2024-05-22 DIAGNOSIS — E78.2 MIXED HYPERLIPIDEMIA: ICD-10-CM

## 2024-05-22 RX ORDER — VALSARTAN 40 MG/1
TABLET ORAL
Qty: 90 TABLET | Refills: 2 | Status: SHIPPED | OUTPATIENT
Start: 2024-05-22

## 2024-05-23 RX ORDER — FOLIC ACID 1 MG/1
TABLET ORAL
Qty: 90 TABLET | Refills: 1 | Status: SHIPPED | OUTPATIENT
Start: 2024-05-23

## 2024-05-23 RX ORDER — ATORVASTATIN CALCIUM 40 MG/1
40 TABLET, FILM COATED ORAL
Qty: 90 TABLET | Refills: 1 | Status: SHIPPED | OUTPATIENT
Start: 2024-05-23

## 2024-06-28 ENCOUNTER — OFFICE VISIT (OUTPATIENT)
Dept: FAMILY MEDICINE CLINIC | Facility: CLINIC | Age: 59
End: 2024-06-28
Payer: COMMERCIAL

## 2024-06-28 ENCOUNTER — LAB (OUTPATIENT)
Dept: LAB | Facility: HOSPITAL | Age: 59
End: 2024-06-28
Payer: COMMERCIAL

## 2024-06-28 VITALS
BODY MASS INDEX: 30.46 KG/M2 | WEIGHT: 182.8 LBS | DIASTOLIC BLOOD PRESSURE: 81 MMHG | HEART RATE: 65 BPM | HEIGHT: 65 IN | SYSTOLIC BLOOD PRESSURE: 132 MMHG | OXYGEN SATURATION: 100 %

## 2024-06-28 DIAGNOSIS — Z13.0 SCREENING FOR DEFICIENCY ANEMIA: ICD-10-CM

## 2024-06-28 DIAGNOSIS — Z13.29 SCREENING FOR THYROID DISORDER: ICD-10-CM

## 2024-06-28 DIAGNOSIS — R73.03 PREDIABETES: ICD-10-CM

## 2024-06-28 DIAGNOSIS — G62.9 NEUROPATHY: ICD-10-CM

## 2024-06-28 DIAGNOSIS — I50.22 CHRONIC SYSTOLIC HEART FAILURE: ICD-10-CM

## 2024-06-28 DIAGNOSIS — Z51.81 ENCOUNTER FOR THERAPEUTIC DRUG LEVEL MONITORING: ICD-10-CM

## 2024-06-28 DIAGNOSIS — Z13.220 SCREENING FOR CHOLESTEROL LEVEL: ICD-10-CM

## 2024-06-28 DIAGNOSIS — K14.0 GLOSSITIS: ICD-10-CM

## 2024-06-28 DIAGNOSIS — I10 PRIMARY HYPERTENSION: ICD-10-CM

## 2024-06-28 DIAGNOSIS — Z13.1 SCREENING FOR DIABETES MELLITUS: ICD-10-CM

## 2024-06-28 DIAGNOSIS — F98.8 ATTENTION DEFICIT DISORDER (ADD) WITHOUT HYPERACTIVITY: ICD-10-CM

## 2024-06-28 DIAGNOSIS — F33.0 MILD EPISODE OF RECURRENT MAJOR DEPRESSIVE DISORDER: ICD-10-CM

## 2024-06-28 DIAGNOSIS — Z00.00 PHYSICAL EXAM, ANNUAL: ICD-10-CM

## 2024-06-28 DIAGNOSIS — Z76.89 ENCOUNTER TO ESTABLISH CARE: ICD-10-CM

## 2024-06-28 DIAGNOSIS — E53.8 FOLATE DEFICIENCY: ICD-10-CM

## 2024-06-28 DIAGNOSIS — Z00.00 PHYSICAL EXAM, ANNUAL: Primary | ICD-10-CM

## 2024-06-28 DIAGNOSIS — N28.9 RENAL INSUFFICIENCY: ICD-10-CM

## 2024-06-28 LAB
ALBUMIN SERPL-MCNC: 4.5 G/DL (ref 3.5–5.2)
ALBUMIN/GLOB SERPL: 2 G/DL
ALP SERPL-CCNC: 107 U/L (ref 39–117)
ALT SERPL W P-5'-P-CCNC: 22 U/L (ref 1–33)
ANION GAP SERPL CALCULATED.3IONS-SCNC: 12.7 MMOL/L (ref 5–15)
AST SERPL-CCNC: 24 U/L (ref 1–32)
BASOPHILS # BLD AUTO: 0.16 10*3/MM3 (ref 0–0.2)
BASOPHILS NFR BLD AUTO: 1.1 % (ref 0–1.5)
BILIRUB SERPL-MCNC: 0.4 MG/DL (ref 0–1.2)
BUN SERPL-MCNC: 28 MG/DL (ref 6–20)
BUN/CREAT SERPL: 12.8 (ref 7–25)
CALCIUM SPEC-SCNC: 9 MG/DL (ref 8.6–10.5)
CHLORIDE SERPL-SCNC: 106 MMOL/L (ref 98–107)
CHOLEST SERPL-MCNC: 139 MG/DL (ref 0–200)
CO2 SERPL-SCNC: 23.3 MMOL/L (ref 22–29)
CREAT SERPL-MCNC: 2.19 MG/DL (ref 0.57–1)
DEPRECATED RDW RBC AUTO: 50.6 FL (ref 37–54)
EGFRCR SERPLBLD CKD-EPI 2021: 25.4 ML/MIN/1.73
EOSINOPHIL # BLD AUTO: 0.43 10*3/MM3 (ref 0–0.4)
EOSINOPHIL NFR BLD AUTO: 3.1 % (ref 0.3–6.2)
ERYTHROCYTE [DISTWIDTH] IN BLOOD BY AUTOMATED COUNT: 16 % (ref 12.3–15.4)
EXPIRATION DATE: NORMAL
FOLATE SERPL-MCNC: >20 NG/ML (ref 4.78–24.2)
GLOBULIN UR ELPH-MCNC: 2.3 GM/DL
GLUCOSE SERPL-MCNC: 92 MG/DL (ref 65–99)
HBA1C MFR BLD: 6 % (ref 4.8–5.6)
HCT VFR BLD AUTO: 39.9 % (ref 34–46.6)
HDLC SERPL-MCNC: 47 MG/DL (ref 40–60)
HGB BLD-MCNC: 12.9 G/DL (ref 12–15.9)
IMM GRANULOCYTES # BLD AUTO: 0.15 10*3/MM3 (ref 0–0.05)
IMM GRANULOCYTES NFR BLD AUTO: 1.1 % (ref 0–0.5)
LDLC SERPL CALC-MCNC: 73 MG/DL (ref 0–100)
LDLC/HDLC SERPL: 1.51 {RATIO}
LYMPHOCYTES # BLD AUTO: 2.72 10*3/MM3 (ref 0.7–3.1)
LYMPHOCYTES NFR BLD AUTO: 19.4 % (ref 19.6–45.3)
Lab: NORMAL
MCH RBC QN AUTO: 28.7 PG (ref 26.6–33)
MCHC RBC AUTO-ENTMCNC: 32.3 G/DL (ref 31.5–35.7)
MCV RBC AUTO: 88.7 FL (ref 79–97)
MONOCYTES # BLD AUTO: 0.7 10*3/MM3 (ref 0.1–0.9)
MONOCYTES NFR BLD AUTO: 5 % (ref 5–12)
NEUTROPHILS NFR BLD AUTO: 70.3 % (ref 42.7–76)
NEUTROPHILS NFR BLD AUTO: 9.83 10*3/MM3 (ref 1.7–7)
NRBC BLD AUTO-RTO: 0 /100 WBC (ref 0–0.2)
PLATELET # BLD AUTO: 361 10*3/MM3 (ref 140–450)
PMV BLD AUTO: 11.3 FL (ref 6–12)
POC CREATININE URINE: 50
POC MICROALBUMIN URINE: 80
POTASSIUM SERPL-SCNC: 5 MMOL/L (ref 3.5–5.2)
PROT SERPL-MCNC: 6.8 G/DL (ref 6–8.5)
RBC # BLD AUTO: 4.5 10*6/MM3 (ref 3.77–5.28)
SODIUM SERPL-SCNC: 142 MMOL/L (ref 136–145)
TRIGL SERPL-MCNC: 106 MG/DL (ref 0–150)
TSH SERPL DL<=0.05 MIU/L-ACNC: 1.64 UIU/ML (ref 0.27–4.2)
VIT B12 BLD-MCNC: 369 PG/ML (ref 211–946)
VLDLC SERPL-MCNC: 19 MG/DL (ref 5–40)
WBC NRBC COR # BLD AUTO: 13.99 10*3/MM3 (ref 3.4–10.8)

## 2024-06-28 PROCEDURE — 36415 COLL VENOUS BLD VENIPUNCTURE: CPT

## 2024-06-28 PROCEDURE — 80050 GENERAL HEALTH PANEL: CPT

## 2024-06-28 PROCEDURE — 82044 UR ALBUMIN SEMIQUANTITATIVE: CPT | Performed by: PHYSICIAN ASSISTANT

## 2024-06-28 PROCEDURE — 82746 ASSAY OF FOLIC ACID SERUM: CPT

## 2024-06-28 PROCEDURE — 99214 OFFICE O/P EST MOD 30 MIN: CPT | Performed by: PHYSICIAN ASSISTANT

## 2024-06-28 PROCEDURE — 80061 LIPID PANEL: CPT

## 2024-06-28 PROCEDURE — 83036 HEMOGLOBIN GLYCOSYLATED A1C: CPT

## 2024-06-28 PROCEDURE — 99396 PREV VISIT EST AGE 40-64: CPT | Performed by: PHYSICIAN ASSISTANT

## 2024-06-28 PROCEDURE — 82607 VITAMIN B-12: CPT

## 2024-06-28 RX ORDER — GABAPENTIN 300 MG/1
300 CAPSULE ORAL 2 TIMES DAILY
Qty: 180 CAPSULE | Refills: 0 | Status: SHIPPED | OUTPATIENT
Start: 2024-06-28

## 2024-06-28 RX ORDER — DESVENLAFAXINE 100 MG/1
1 TABLET, EXTENDED RELEASE ORAL DAILY
COMMUNITY
Start: 2024-04-22

## 2024-06-28 RX ORDER — TIOTROPIUM BROMIDE AND OLODATEROL 3.124; 2.736 UG/1; UG/1
2 SPRAY, METERED RESPIRATORY (INHALATION)
COMMUNITY

## 2024-06-28 NOTE — PROGRESS NOTES
Chief Complaint   Patient presents with    Annual Exam       Nae Sanchez is a pleasant 59 y.o. female who is here for annual physical exam.  Patient presents for management of her CHF, hypertension, hyperlipidemia, prediabetes, depression, anxiety and insomnia.  Patient is followed by psychiatry who manages all of her psychiatric medications.  She reports stable mood.  She is followed by cardiology.  Her blood pressure is stable and well-controlled.  She is currently on Jardiance 10 mg daily and states this was started for her heart failure.  She has never been diagnosed as a diabetic per patient.  She is smoking about half a pack a day.  She is followed by pulmonology and is up-to-date on her CT chest low-dose.  She is overdue for a Pap smear and has not established with a gynecologist.  She does not go to the dentist as she has dentures.  She is going to the ophthalmologist regularly.  She denies any issues with her feet other than neuropathy.  Reviewed vaccines.    Past Medical History:   Diagnosis Date    ADHD (attention deficit hyperactivity disorder)     Anxiety     CHF (congestive heart failure) 2022    Chronic obstructive pulmonary disease (COPD) 2022    Coronary artery disease 2022    Depression     Diverticulosis     Heart murmur 1965    Hypertension 2022    Myocardial infarction 2022    Osteopenia     Peptic ulceration     Renal insufficiency     Renal insufficiency 2022    Sleep apnea  noticed    Tobacco use 2022       Past Surgical History:   Procedure Laterality Date    CARDIAC CATHETERIZATION N/A 2022    Procedure: Left Heart Cath;  Surgeon: Greta Sanchez MD;  Location:  [facility];  Service: Cardiology;  Laterality: N/A;     SECTION  1997    COLONOSCOPY  2019    COLONOSCOPY N/A 01/10/2023    Procedure: COLONOSCOPY;  Surgeon: Rebel De Paz MD;  Location:  [facility];  Service:  Gastroenterology;  Laterality: N/A;    FOOT SURGERY Left     FRACTURE SURGERY  05/25/21    Left foot    TUBAL ABDOMINAL LIGATION  09/21/97       Family History   Problem Relation Age of Onset    Anxiety disorder Mother     Hypertension Father     Diabetes Father     No Known Problems Sister     Heart disease Brother     Early death Brother     Mental illness Daughter     Breast cancer Neg Hx        Social History     Socioeconomic History    Marital status:    Tobacco Use    Smoking status: Every Day     Current packs/day: 1.00     Average packs/day: 1 pack/day for 42.1 years (41.6 ttl pk-yrs)     Types: Cigarettes     Start date: 1/1/1982     Last attempt to quit: 8/29/2022     Passive exposure: Never    Smokeless tobacco: Former     Quit date: 8/30/2022   Vaping Use    Vaping status: Never Used   Substance and Sexual Activity    Alcohol use: No    Drug use: No    Sexual activity: Not Currently     Partners: Male     Birth control/protection: Tubal ligation       No Known Allergies    ROS  Review of Systems   Constitutional:  Negative for chills, diaphoresis, fatigue and fever.   HENT:  Negative for congestion, ear pain, hearing loss, postnasal drip, rhinorrhea and sore throat.    Eyes:  Negative for blurred vision and pain.   Respiratory:  Negative for cough, shortness of breath and wheezing.    Cardiovascular:  Negative for chest pain and leg swelling.   Gastrointestinal:  Negative for abdominal pain, blood in stool, constipation, diarrhea, nausea, vomiting and indigestion.   Endocrine: Negative for polyuria.   Genitourinary:  Negative for dysuria, flank pain and hematuria.   Musculoskeletal:  Negative for arthralgias, gait problem and myalgias.   Skin:  Negative for rash and skin lesions.   Neurological:  Positive for numbness. Negative for dizziness and headache.   Psychiatric/Behavioral:  Negative for self-injury, sleep disturbance, suicidal ideas, depressed mood and stress. The patient is not  "nervous/anxious.        Vitals:    06/28/24 1010   BP: 132/81   Pulse: 65   SpO2: 100%   Weight: 82.9 kg (182 lb 12.8 oz)   Height: 165.1 cm (65\")     Body mass index is 30.42 kg/m².    BMI is >= 30 and <35. (Class 1 Obesity). The following options were offered after discussion;: exercise counseling/recommendations and nutrition counseling/recommendations       Current Outpatient Medications on File Prior to Visit   Medication Sig Dispense Refill    amphetamine-dextroamphetamine XR (ADDERALL XR) 30 MG 24 hr capsule Take 1 capsule by mouth Every Morning      aspirin 81 MG EC tablet Take 1 tablet by mouth Daily. 30 tablet 6    atorvastatin (LIPITOR) 40 MG tablet TAKE 1 TABLET BY MOUTH EVERY DAY AT NIGHT 90 tablet 1    Calcium Carbonate-Vitamin D (Oyster Shell Calcium/D) 500-5 MG-MCG tablet Take 1 tablet by mouth Daily.      carvedilol (COREG) 12.5 MG tablet Take 1.5 tablets by mouth 2 (Two) Times a Day With Meals for 360 days. 270 tablet 3    desvenlafaxine (PRISTIQ) 100 MG 24 hr tablet Take 1 tablet by mouth Daily.      famotidine (PEPCID) 20 MG tablet Take 1 tablet by mouth 2 (Two) Times a Day. 180 tablet 1    folic acid (FOLVITE) 1 MG tablet TAKE 1 TABLET BY MOUTH EVERY DAY 90 tablet 1    Jardiance 10 MG tablet tablet TAKE 1 TABLET BY MOUTH EVERY DAY 90 tablet 1    mirtazapine (REMERON) 15 MG tablet Take 1 tablet by mouth Daily.      Stiolto Respimat 2.5-2.5 MCG/ACT aerosol solution inhaler Inhale 2 puffs.      torsemide (DEMADEX) 5 MG tablet TAKE 1 TABLET BY MOUTH DAILY AS NEEDED (WEIGHT GAIN OF 3LB OR INCREASED LEG SWELLING). 30 tablet 6    triamcinolone (KENALOG) 0.1 % ointment Apply 1 application  topically to the appropriate area as directed 2 (Two) Times a Day. 30 g 0    valsartan (DIOVAN) 40 MG tablet TAKE 1 TABLET BY MOUTH EVERY DAY 90 tablet 2    [DISCONTINUED] gabapentin (NEURONTIN) 300 MG capsule Take 1 capsule by mouth 2 (Two) Times a Day. 60 capsule 2    [DISCONTINUED] amphetamine-dextroamphetamine XR " (ADDERALL XR) 25 MG 24 hr capsule Take 1 capsule by mouth Every Morning      [DISCONTINUED] azithromycin (Zithromax Z-Chong) 250 MG tablet Take 2 tablets the first day, then 1 tablet daily for 4 days. 6 tablet 0     No current facility-administered medications on file prior to visit.       Results for orders placed or performed in visit on 06/28/24   POCT microalbumin    Specimen: Urine   Result Value Ref Range    Microalbumin, Urine 80     Creatinine, Urine 50     Lot Number [id number]     Expiration Date 2-18-25 PE    Physical Exam  Vitals reviewed.   Constitutional:       General: She is not in acute distress.     Appearance: Normal appearance. She is well-developed. She is obese. She is not ill-appearing or diaphoretic.   HENT:      Head: Normocephalic and atraumatic.      Right Ear: Hearing, tympanic membrane, ear canal and external ear normal.      Left Ear: Hearing, tympanic membrane, ear canal and external ear normal.      Nose: Nose normal.      Right Sinus: No maxillary sinus tenderness or frontal sinus tenderness.      Left Sinus: No maxillary sinus tenderness or frontal sinus tenderness.      Mouth/Throat:      Pharynx: Uvula midline.   Eyes:      General: Lids are normal.      Extraocular Movements: Extraocular movements intact.      Conjunctiva/sclera: Conjunctivae normal.   Neck:      Thyroid: No thyroid mass or thyromegaly.      Trachea: Trachea and phonation normal.   Cardiovascular:      Rate and Rhythm: Normal rate and regular rhythm.      Heart sounds: Normal heart sounds.   Pulmonary:      Effort: Pulmonary effort is normal.      Breath sounds: Normal breath sounds.   Abdominal:      General: Bowel sounds are normal. There is no distension.      Palpations: Abdomen is soft. Abdomen is not rigid.      Tenderness: There is no abdominal tenderness. There is no guarding.   Musculoskeletal:         General: Normal range of motion.      Cervical back: Normal range of motion.      Right lower  leg: No edema.      Left lower leg: No edema.      Right foot: Normal range of motion.      Left foot: Normal range of motion.   Feet:      Right foot:      Skin integrity: Skin integrity normal.      Left foot:      Skin integrity: Skin integrity normal.      Comments: Diabetic Foot Exam Performed      Lymphadenopathy:      Cervical: No cervical adenopathy.      Right cervical: No superficial cervical adenopathy.     Left cervical: No superficial cervical adenopathy.   Skin:     General: Skin is warm.      Findings: No erythema or rash.      Nails: There is no clubbing.   Neurological:      Mental Status: She is alert and oriented to person, place, and time.      Coordination: Coordination normal.      Gait: Gait normal.      Deep Tendon Reflexes: Reflexes are normal and symmetric.      Comments: CN grossly intact   Psychiatric:         Attention and Perception: Attention and perception normal. She is attentive.         Mood and Affect: Mood normal. Affect is flat.         Speech: Speech normal.         Behavior: Behavior normal. Behavior is cooperative.         Thought Content: Thought content normal.         Cognition and Memory: Cognition and memory normal.         Judgment: Judgment normal.         A/P    Diagnoses and all orders for this visit:    1. Physical exam, annual (Primary)  -     CBC Auto Differential; Future  -     Comprehensive Metabolic Panel; Future  -     TSH Rfx On Abnormal To Free T4; Future  -     Hemoglobin A1c; Future  -     Lipid Panel; Future  -     Cancel: Microalbumin / Creatinine Urine Ratio - Urine, Clean Catch; Future  PE completed  Preventative labs ordered  Colonoscopy is up-to-date  Mammogram is up-to-date  Gynecologist - referral placed  Dentist - has dentures  Ophthalmologist - encouraged to go regularly  Vaccinations discussed    2. Primary hypertension  Stable, well-controlled.  Compliant on medication.    3. Chronic systolic heart failure  Followed by cardiology.    4.  Attention deficit disorder (ADD) without hyperactivity  5. Mild episode of recurrent major depressive disorder  Followed by psychiatry who manages all of her medications.    6. Renal insufficiency  Will repeat labs.  On Jardiance 10 mg daily.    7. Prediabetes  -     Hemoglobin A1c; Future  -     POCT microalbumin    8. Neuropathy  -     gabapentin (NEURONTIN) 300 MG capsule; Take 1 capsule by mouth 2 (Two) Times a Day.  Dispense: 180 capsule; Refill: 0  Patient is taking 2 tablets of gabapentin daily to help with her neuropathic pain.  She reports that this helps with her symptoms.  She denies any adverse effects.  Will update UDS today.    9. Glossitis  -     Vitamin B12; Future    10. Folate deficiency  -     Folate; Future    11. Screening for deficiency anemia  -     CBC Auto Differential; Future    12. Screening for diabetes mellitus  -     Comprehensive Metabolic Panel; Future    13. Screening for thyroid disorder  -     TSH Rfx On Abnormal To Free T4; Future    14. Screening for cholesterol level       -   Lipid panel    15. Encounter to establish care  -     Ambulatory Referral to Gynecology    16. Encounter for therapeutic drug level monitoring  -     Compliance Drug Analysis, Ur - Urine, Clean Catch         Plan of care reviewed with patient at the conclusion of today's visit. Education was provided regarding nutrition , exercise, supplements, preventative screenings, and vaccinations diagnosis, management and any prescribed or recommended OTC medications.  Patient verbalizes understanding of and agreement with management plan.    Dictated Utilizing Dragon Dictation     Please note that portions of this note were completed with a voice recognition program.     Part of this note may be an electronic transcription/translation of spoken language to printed text using the Dragon Dictation System.    Return in about 3 months (around 9/28/2024) for [facility].     Sandee Keen PA-C

## 2024-07-10 LAB — DRUGS UR: NORMAL

## 2024-07-22 DIAGNOSIS — Z72.0 TOBACCO ABUSE: Primary | ICD-10-CM

## 2024-07-22 RX ORDER — NICOTINE 4 MG/1
1 INHALANT RESPIRATORY (INHALATION) AS NEEDED
Qty: 168 EACH | Refills: 1 | Status: SHIPPED | OUTPATIENT
Start: 2024-07-22

## 2024-08-22 DIAGNOSIS — R10.816 EPIGASTRIC ABDOMINAL TENDERNESS WITHOUT REBOUND TENDERNESS: ICD-10-CM

## 2024-08-23 RX ORDER — FAMOTIDINE 20 MG/1
20 TABLET, FILM COATED ORAL 2 TIMES DAILY
Qty: 180 TABLET | Refills: 1 | Status: SHIPPED | OUTPATIENT
Start: 2024-08-23

## 2024-08-23 NOTE — TELEPHONE ENCOUNTER
Rx Refill Note  Requested Prescriptions     Pending Prescriptions Disp Refills    famotidine (PEPCID) 20 MG tablet [Pharmacy Med Name: FAMOTIDINE 20 MG TABLET] 180 tablet 1     Sig: TAKE 1 TABLET BY MOUTH TWICE A DAY      Last office visit with prescribing clinician: 6/28/2024   Last telemedicine visit with prescribing clinician: Visit date not found   Next office visit with prescribing clinician: 9/27/2024                         Would you like a call back once the refill request has been completed: [] Yes [] No    If the office needs to give you a call back, can they leave a voicemail: [] Yes [] No    Anuja Penaloza MA  08/23/24, 13:19 EDT

## 2024-09-11 DIAGNOSIS — I50.22 CHRONIC SYSTOLIC HEART FAILURE: ICD-10-CM

## 2024-09-11 DIAGNOSIS — E78.2 MIXED HYPERLIPIDEMIA: ICD-10-CM

## 2024-09-11 DIAGNOSIS — R73.03 PREDIABETES: ICD-10-CM

## 2024-09-12 RX ORDER — ATORVASTATIN CALCIUM 40 MG/1
40 TABLET, FILM COATED ORAL
Qty: 90 TABLET | Refills: 1 | Status: SHIPPED | OUTPATIENT
Start: 2024-09-12

## 2024-09-12 RX ORDER — EMPAGLIFLOZIN 10 MG/1
TABLET, FILM COATED ORAL
Qty: 90 TABLET | Refills: 1 | Status: SHIPPED | OUTPATIENT
Start: 2024-09-12

## 2024-09-27 ENCOUNTER — OFFICE VISIT (OUTPATIENT)
Dept: FAMILY MEDICINE CLINIC | Facility: CLINIC | Age: 59
End: 2024-09-27
Payer: COMMERCIAL

## 2024-09-27 VITALS
HEIGHT: 65 IN | BODY MASS INDEX: 30.89 KG/M2 | HEART RATE: 99 BPM | DIASTOLIC BLOOD PRESSURE: 96 MMHG | WEIGHT: 185.4 LBS | SYSTOLIC BLOOD PRESSURE: 140 MMHG | OXYGEN SATURATION: 96 %

## 2024-09-27 DIAGNOSIS — I50.22 CHRONIC SYSTOLIC HEART FAILURE: ICD-10-CM

## 2024-09-27 DIAGNOSIS — Z51.81 ENCOUNTER FOR THERAPEUTIC DRUG LEVEL MONITORING: ICD-10-CM

## 2024-09-27 DIAGNOSIS — M79.89 LEG SWELLING: ICD-10-CM

## 2024-09-27 DIAGNOSIS — G62.9 NEUROPATHY: Primary | ICD-10-CM

## 2024-09-27 DIAGNOSIS — N18.4 STAGE 4 CHRONIC KIDNEY DISEASE: ICD-10-CM

## 2024-09-27 DIAGNOSIS — I10 PRIMARY HYPERTENSION: ICD-10-CM

## 2024-09-27 DIAGNOSIS — E53.8 VITAMIN B12 DEFICIENCY: ICD-10-CM

## 2024-09-27 PROCEDURE — 99214 OFFICE O/P EST MOD 30 MIN: CPT | Performed by: PHYSICIAN ASSISTANT

## 2024-09-27 RX ORDER — HYALURONATE SODIUM 20 MG/2 ML
SYRINGE (ML) INTRAARTICULAR
COMMUNITY
Start: 2024-08-20

## 2024-09-27 RX ORDER — GABAPENTIN 300 MG/1
300 CAPSULE ORAL 2 TIMES DAILY
Qty: 180 CAPSULE | Refills: 0 | Status: SHIPPED | OUTPATIENT
Start: 2024-09-27 | End: 2024-09-27

## 2024-09-27 RX ORDER — GABAPENTIN 300 MG/1
300 CAPSULE ORAL DAILY
COMMUNITY

## 2024-09-27 RX ORDER — MAGNESIUM 200 MG
1 TABLET ORAL DAILY
Qty: 90 EACH | Refills: 3 | Status: SHIPPED | OUTPATIENT
Start: 2024-09-27

## 2024-10-03 LAB — DRUGS UR: NORMAL

## 2024-11-04 NOTE — PROGRESS NOTES
Subjective   Chief Complaint   Patient presents with    Gynecologic Exam      New woman exam     Nae Sanchez is a 59 y.o. year old  menopausal female presenting to be seen for her annual exam.  This past year she has not been on hormone replacement therapy.  There has not been vaginal bleeding in the last 12 months.  Menopausal symptoms are not present.  She had a diagnostic mammogram January of this year with recommendation for routine yearly screening.  She is due for her routine mammogram this month.  She has had 2 full-term deliveries her first with a vaginal delivery in  with baby weighing 7 pounds.  Her second was a emergency  delivery in  with baby weighing 5 pounds.  She had a DEXA scan  which showed osteopenia.  She is currently on calcium and vitamin D supplementation.  Her health history is complicated by type 2 diabetes, COPD, primary hypertension and stage IV chronic kidney disease.  She thinks her last pap was around 10 years ago, denies history of abnormal pap.   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  SEXUAL Hx:  She is not currently sexually active.  In the past year there there has been NO new sexual partners.    Condoms are never used.  She would not like to be screened for STD's at today's exam.  [place] is painful: not asked  HEALTH Hx:  She exercises regularly: yes. Walking   She wears her seat belt: yes.  She has concerns about domestic violence: no.  She has noticed changes in height: no.  OTHER THINGS SHE WANTS TO DISCUSS TODAY:  Nothing else    The following portions of the patient's history were reviewed and updated as appropriate:problem list, current medications, allergies, past family history, past medical history, past social history, and past surgical history.    Social History    Tobacco Use      Smoking status: Every Day        Packs/day: 0.50        Years: 1 pack/day for 42.5 years (41.9 ttl pk-yrs)        Types: Cigarettes        Start  "date: 1/1/1982        Last attempt to quit: 8/29/2022        Passive exposure: Never      Smokeless tobacco: Never      Review of Systems  Constitutional POS: nothing reported    NEG: anorexia or night sweats   Genitourinary POS: nothing reported    NEG: dysuria or hematuria      Gastointestinal POS: nothing reported    NEG: bloating, change in bowel habits, melena, or reflux symptoms   Integument POS: nothing reported    NEG: moles that are changing in size, shape, color or rashes   Breast POS: nothing reported    NEG: persistent breast lump, skin dimpling, or nipple discharge        Objective   /88 (BP Location: Left arm, Patient Position: Sitting, Cuff Size: Adult)   Ht 165.1 cm (65\")   Wt 81.6 kg (180 lb)   BMI 29.95 kg/m²     General:  well developed; well nourished  no acute distress  mentation appropriate   Skin:  No suspicious lesions seen   Thyroid: normal to inspection and palpation   Breasts:  Examined in supine position  Symmetric without masses or skin dimpling  Nipples normal without inversion, lesions or discharge  There are no palpable axillary nodes   Abdomen: soft, non-tender; no masses  no umbilical or inguinal hernias are present  no hepato-splenomegaly   Pelvis: Clinical staff was present for exam  External genitalia:  normal appearance of the external genitalia including Bartholin's and [place]'s glands.  :  urethral meatus normal;  Vaginal:  atrophic mucosal changes are present;  Cervix:  normal appearance.  Uterus:  normal size, shape and consistency.  Adnexa:  non palpable bilaterally.  Rectal:  digital rectal exam not performed; anus visually normal appearing.        Assessment   Well woman with routine gynecological exam  Tobacco use   She is up to date on all relevant gynecologic and colorectal screenings except PAP test       Plan   Pap was done today.  If she does not receive the results of the Pap within 2 weeks  time, she was instructed to call to find out the results.  I " explained to Nae that the recommendations for Pap smear interval in a low risk patient's has lengthened to 3 years time.  I encouraged her to be seen yearly for a full physical exam including breast and pelvic exam even during the off years when PAP's will not be performed.  She was encouraged to get yearly mammograms.  She should report any palpable breast lump(s) or skin changes regardless of mammographic findings.  I explained to Nae that notification regarding her mammogram results will come from the center performing the study.  Our office will not be routinely calling with mammogram results.  It is her responsibility to make sure that the results from the mammogram are communicated to her by the breast center.  If she has any questions about the results, she is welcome to call our office anytime.  Encouraged her to continue to decrease tobacco use and stop smoking, she is trying to quit currently   The importance of keeping all planned follow-up and taking all medications as prescribed was emphasized.  Today I discussed with Nae the total recommended calcium intake for a post-menopausal female is 1200 mg.  Ideally this should be from dietary sources.  I reviewed calcium content in various foods including milk, fortified orange juice and yogurt.  If she cannot get sufficient calcium through dietary means, it is recommended to supplement with either a multivitamin or calcium to reach her daily goal.  I also reviewed the difference in the bioavailability of calcium carbonate and calcium citrate containing supplements and the importance of taking calcium carbonate containing products with food. Finally, vitamin D's role in calcium absorption was reviewed and a total daily vitamin D intake of 600 units was recommended.  Her vaccine record was reviewed and updated.  Bone density testing was recommended.  I reviewed with Nae that it was always most advisable for all bone density tests for each patient to be  done on the same machine over time.  The purpose of this is to improve the accuracy of the interpretation of serial studies.  Follow up for annual exam 1 year    No orders of the defined types were placed in this encounter.         This note was electronically signed.  Darlene Antoine, NELDA  November 5, 2024

## 2024-11-05 ENCOUNTER — OFFICE VISIT (OUTPATIENT)
Dept: OBSTETRICS AND GYNECOLOGY | Facility: CLINIC | Age: 59
End: 2024-11-05
Payer: COMMERCIAL

## 2024-11-05 VITALS
HEIGHT: 65 IN | WEIGHT: 180 LBS | SYSTOLIC BLOOD PRESSURE: 130 MMHG | DIASTOLIC BLOOD PRESSURE: 88 MMHG | BODY MASS INDEX: 29.99 KG/M2

## 2024-11-05 DIAGNOSIS — Z01.419 WELL WOMAN EXAM WITH ROUTINE GYNECOLOGICAL EXAM: Primary | ICD-10-CM

## 2024-11-05 DIAGNOSIS — M85.80 OSTEOPENIA, SENILE: ICD-10-CM

## 2024-11-05 DIAGNOSIS — Z72.0 TOBACCO ABUSE: ICD-10-CM

## 2024-11-05 PROCEDURE — 99459 PELVIC EXAMINATION: CPT | Performed by: NURSE PRACTITIONER

## 2024-11-05 PROCEDURE — 99396 PREV VISIT EST AGE 40-64: CPT | Performed by: NURSE PRACTITIONER

## 2024-11-06 LAB — REF LAB TEST METHOD: NORMAL

## 2024-12-19 ENCOUNTER — OFFICE VISIT (OUTPATIENT)
Dept: FAMILY MEDICINE CLINIC | Facility: CLINIC | Age: 59
End: 2024-12-19
Payer: COMMERCIAL

## 2024-12-19 VITALS
DIASTOLIC BLOOD PRESSURE: 78 MMHG | HEIGHT: 65 IN | OXYGEN SATURATION: 97 % | HEART RATE: 88 BPM | SYSTOLIC BLOOD PRESSURE: 126 MMHG | WEIGHT: 179 LBS | BODY MASS INDEX: 29.82 KG/M2

## 2024-12-19 DIAGNOSIS — Z51.81 ENCOUNTER FOR THERAPEUTIC DRUG LEVEL MONITORING: ICD-10-CM

## 2024-12-19 DIAGNOSIS — Z23 IMMUNIZATION DUE: ICD-10-CM

## 2024-12-19 DIAGNOSIS — I10 PRIMARY HYPERTENSION: ICD-10-CM

## 2024-12-19 DIAGNOSIS — E11.22 TYPE 2 DIABETES MELLITUS WITH STAGE 4 CHRONIC KIDNEY DISEASE, WITHOUT LONG-TERM CURRENT USE OF INSULIN: Primary | ICD-10-CM

## 2024-12-19 DIAGNOSIS — N18.4 TYPE 2 DIABETES MELLITUS WITH STAGE 4 CHRONIC KIDNEY DISEASE, WITHOUT LONG-TERM CURRENT USE OF INSULIN: Primary | ICD-10-CM

## 2024-12-19 DIAGNOSIS — G62.9 NEUROPATHY: ICD-10-CM

## 2024-12-19 LAB
EXPIRATION DATE: ABNORMAL
HBA1C MFR BLD: 6.4 % (ref 4.5–5.7)
Lab: ABNORMAL

## 2024-12-19 PROCEDURE — 83036 HEMOGLOBIN GLYCOSYLATED A1C: CPT | Performed by: PHYSICIAN ASSISTANT

## 2024-12-19 PROCEDURE — 90656 IIV3 VACC NO PRSV 0.5 ML IM: CPT | Performed by: PHYSICIAN ASSISTANT

## 2024-12-19 PROCEDURE — 90471 IMMUNIZATION ADMIN: CPT | Performed by: PHYSICIAN ASSISTANT

## 2024-12-19 PROCEDURE — 99214 OFFICE O/P EST MOD 30 MIN: CPT | Performed by: PHYSICIAN ASSISTANT

## 2024-12-19 RX ORDER — GABAPENTIN 300 MG/1
300 CAPSULE ORAL DAILY
Qty: 90 CAPSULE | Refills: 0 | Status: SHIPPED | OUTPATIENT
Start: 2024-12-19

## 2024-12-19 NOTE — PROGRESS NOTES
Chief Complaint   Patient presents with    Neuropathyy       Nae Sanchez is a pleasant 59 y.o. female who is here for routine follow-up of prediabetes, neuropathy, hypertension.  Patient's blood pressure on repeat is improved.  She has had episodic headaches and high readings at home.  She will discuss this with her cardiologist and nephrologist.  Currently on Jardiance 10 mg.  She reports that she is watching her diet and limiting her sugary foods/beverages.  At last appointment, we reduced her gabapentin to once daily.  She has noticed more burning in her feet since changing dose.  Gabapentin is still helping though and she is able to manage her symptoms.    Past Medical History:   Diagnosis Date    Abnormal Pap smear of cervix     ADHD (attention deficit hyperactivity disorder) 2019    Anxiety     CHF (congestive heart failure) 2022    Chlamydia     Chronic obstructive pulmonary disease (COPD) 2022    Coronary artery disease 2022    Depression     Diverticulosis     Heart murmur 1965    Hypertension 2022    Myocardial infarction 2022    Osteopenia     Peptic ulceration     Renal insufficiency     Renal insufficiency 2022    Rh incompatibility     Sleep apnea  noticed    Tobacco use 2022    Urinary tract infection     Varicella        Past Surgical History:   Procedure Laterality Date    CARDIAC CATHETERIZATION N/A 2022    Procedure: Left Heart Cath;  Surgeon: Greta Sanchez MD;  Location:  [facility];  Service: Cardiology;  Laterality: N/A;     SECTION  1997     SECTION WITH TUBAL  1997    COLONOSCOPY  2019    COLONOSCOPY N/A 01/10/2023    Procedure: COLONOSCOPY;  Surgeon: Rebel De Paz MD;  Location:  [facility];  Service: Gastroenterology;  Laterality: N/A;    FOOT SURGERY Left     FRACTURE SURGERY  21    Left foot    TUBAL ABDOMINAL LIGATION  97       Family History  "  Problem Relation Age of Onset    Anxiety disorder Mother     Hypertension Father     Diabetes Father     No Known Problems Sister     Heart disease Brother     Early death Brother     Mental illness Daughter     Colon cancer Maternal Grandmother     Breast cancer Neg Hx        Social History     Socioeconomic History    Marital status:     Number of children: 2    Highest education level: Some college, no degree   Tobacco Use    Smoking status: Every Day     Current packs/day: 0.50     Average packs/day: 1 pack/day for 42.6 years (41.9 ttl pk-yrs)     Types: Cigarettes     Start date: 1/1/1982     Last attempt to quit: 8/29/2022     Passive exposure: Never    Smokeless tobacco: Never   Vaping Use    Vaping status: Never Used   Substance and Sexual Activity    Alcohol use: No    Drug use: No    Sexual activity: Not Currently     Partners: Male     Birth control/protection: Tubal ligation       No Known Allergies    ROS  Review of Systems   Constitutional:  Negative for chills and fever.   Respiratory:  Negative for cough, shortness of breath and wheezing.    Cardiovascular:  Negative for chest pain, palpitations and leg swelling.   Neurological:  Positive for numbness and headache. Negative for dizziness.       Vitals:    12/19/24 0916 12/19/24 1030   BP: 130/90 126/78   BP Location: Left arm    Patient Position: Sitting    Cuff Size: Adult    Pulse: 88    SpO2: 97%    Weight: 81.2 kg (179 lb)    Height: 165.1 cm (65\")      Body mass index is 29.79 kg/m².           Current Outpatient Medications on File Prior to Visit   Medication Sig Dispense Refill    amphetamine-dextroamphetamine XR (ADDERALL XR) 30 MG 24 hr capsule Take 1 capsule by mouth Every Morning      aspirin 81 MG EC tablet Take 1 tablet by mouth Daily. 30 tablet 6    atorvastatin (LIPITOR) 40 MG tablet TAKE 1 TABLET BY MOUTH EVERY DAY AT NIGHT 90 tablet 1    Calcium Carbonate-Vitamin D (Oyster Shell Calcium/D) 500-5 MG-MCG tablet Take 1 tablet by " mouth Daily.      carvedilol (COREG) 12.5 MG tablet Take 1.5 tablets by mouth 2 (Two) Times a Day With Meals for 360 days. 270 tablet 3    Cyanocobalamin (Vitamin B-12) 1000 MCG sublingual tablet Place 1 tablet under the tongue Daily. 90 each 3    desvenlafaxine (PRISTIQ) 100 MG 24 hr tablet Take 1 tablet by mouth Daily.      famotidine (PEPCID) 20 MG tablet TAKE 1 TABLET BY MOUTH TWICE A  tablet 1    folic acid (FOLVITE) 1 MG tablet TAKE 1 TABLET BY MOUTH EVERY DAY 90 tablet 1    Jardiance 10 MG tablet tablet TAKE 1 TABLET BY MOUTH EVERY DAY 90 tablet 1    mirtazapine (REMERON) 15 MG tablet Take 1 tablet by mouth Daily.      Stiolto Respimat 2.5-2.5 MCG/ACT aerosol solution inhaler Inhale 2 puffs.      valsartan (DIOVAN) 40 MG tablet TAKE 1 TABLET BY MOUTH EVERY DAY 90 tablet 2    [DISCONTINUED] gabapentin (NEURONTIN) 300 MG capsule Take 1 capsule by mouth Daily.       No current facility-administered medications on file prior to visit.       Results for orders placed or performed in visit on 24   LIQUID-BASED PAP SMEAR WITH HPV GENOTYPING REGARDLESS OF INTERPRETATION (CITLALI,COR,MAD)    Collection Time: 24 10:50 AM    Specimen: Cervix, Endocervix; ThinPrep Vial   Result Value Ref Range    Reference Lab Report       [facility]  [address] [place] [address]  Phone: [phone] or [phone]  Fax: [phone]  Rocky Diaz M.D., Medical Director    PATIENT NAME                                     LABORATORY NO.  NEELA HAMILTON.                              [id number]  [id number]                                 AGE                    SEX   SSN              CLIENT REF #  [facility]                       59        1965      F     [id number]      [id number]    NELDA CERON                       REQUESTING M.D.           ATTENDING M.D.         COPY TO.  [address]            [place], [place] [address]                        DATE COLLECTED            DATE RECEIVED          DATE REPORTED  11/05/2024 11/05/2024 11/06/2024    ThinPrep Pap with Cytyc Imaging    DIAGNOSIS:  Epithelial cell abnormality. (ASC) Atypical squamous cells of  undetermined  significance.    Professional interpretation rendered by Himanshu Perez M.D.,VASQUEZAMADELIN at [facility], [employer], [address] [place] [address].  SPECIMEN ADEQUACY:  SATISFACTORY FOR EVALUATION  Transformation zone is present.  Partially obscuring inflammation is present.  SOURCE OF SPECIMEN:       CERVICAL/ENDOCERVICAL  SLIDES:  1  CLINICAL HISTORY:  Well woman exam with routine gynecological exam    HPV  HR-HPV POOL: Negative    The Aptima HPV assay is an in vitro nucleic acid amplification test for the  qualitative detection of E6/E7 viral messenger RNA from 14 high risk types of  HPV in cervical specimens. The high risk HPV types detected include: 16, 18,  31, 33, 35, 39, 45, 51, 52, 56, 58, 59, 66, 68    CYTOTECHNOLOGIST:             LIZ ESPANA (ASCP)                                      REVIEWED, DIAGNOSED AND  ELECTRONICALLY SIGNED BY:      Himanshu Perez M.D.,LUANN.C.A.P.    CPT CODES:  97585, 39234, 00644         PE    Physical Exam  Vitals reviewed.   Constitutional:       General: She is not in acute distress.     Appearance: Normal appearance. She is well-developed and normal weight. She is not ill-appearing or diaphoretic.   HENT:      Head: Normocephalic and atraumatic.   Eyes:      Extraocular Movements: Extraocular movements intact.      Conjunctiva/sclera: Conjunctivae normal.   Pulmonary:      Effort: Pulmonary effort is normal. No respiratory distress.   Musculoskeletal:         General: Normal range of motion.      Cervical back: Normal range of motion.      Right lower leg: No edema.      Left lower leg: No edema.   Skin:     General: Skin is warm.      Findings: No erythema or rash.   Neurological:      General: No focal  deficit present.      Mental Status: She is alert.   Psychiatric:         Attention and Perception: She is attentive.         Mood and Affect: Mood normal.         Speech: Speech normal.         Behavior: Behavior normal. Behavior is cooperative.         Thought Content: Thought content normal.         Judgment: Judgment normal.           A/P    Diagnoses and all orders for this visit:    1. Type 2 diabetes mellitus with stage 4 chronic kidney disease, without long-term current use of insulin (Primary)  -     POC Glycosylated Hemoglobin (Hb A1C)  Hemoglobin A1c is 6.4%.  patient is considered diabetic since she is on Jardiance 10 mg.  Increasing Jardiance dose is not indicated for diabetes given her kidney function.  Asked her to speak with her nephrologist about this and see if she recommends going to Jardiance 25 mg.    2. Primary hypertension  On repeat, BP is normal.  She has had high readings at home with headache.  She will reach out to her cardiologist.    3. Neuropathy  -     gabapentin (NEURONTIN) 300 MG capsule; Take 1 capsule by mouth Daily.  Dispense: 90 capsule; Refill: 0  Doing well on gabapentin 300 mg nightly.  Getting good benefit.  She was on twice daily and this worked better but reduced due to kidney dysfunction.  Will update UDS today.  Return in 3 months.    4. Encounter for therapeutic drug level monitoring  -     Compliance Drug Analysis, Ur - Urine, Clean Catch; Future    5. Immunization due  -     Fluzone >6mos (5134-2209)         Plan of care reviewed with patient at the conclusion of today's visit. Education was provided regarding diagnosis, management and any prescribed or recommended OTC medications.  Patient verbalizes understanding of and agreement with management plan.    Dictated Utilizing Dragon Dictation     Please note that portions of this note were completed with a voice recognition program.     Part of this note may be an electronic transcription/translation of spoken language  to printed text using the Dragon Dictation System.    Return in about 3 months (around 3/19/2025) for Recheck, neuropathy.     aSndee Keen PA-C

## 2024-12-31 LAB — DRUGS UR: NORMAL

## 2025-01-06 DIAGNOSIS — E53.8 FOLATE DEFICIENCY: ICD-10-CM

## 2025-01-07 RX ORDER — FOLIC ACID 1 MG/1
TABLET ORAL
Qty: 90 TABLET | Refills: 1 | Status: SHIPPED | OUTPATIENT
Start: 2025-01-07

## 2025-02-05 ENCOUNTER — OFFICE VISIT (OUTPATIENT)
Dept: CARDIOLOGY | Facility: CLINIC | Age: 60
End: 2025-02-05
Payer: COMMERCIAL

## 2025-02-05 VITALS
HEART RATE: 78 BPM | SYSTOLIC BLOOD PRESSURE: 148 MMHG | WEIGHT: 179.6 LBS | DIASTOLIC BLOOD PRESSURE: 92 MMHG | HEIGHT: 65 IN | BODY MASS INDEX: 29.92 KG/M2 | OXYGEN SATURATION: 95 %

## 2025-02-05 DIAGNOSIS — I50.22 CHRONIC SYSTOLIC HEART FAILURE: ICD-10-CM

## 2025-02-05 DIAGNOSIS — N28.9 RENAL INSUFFICIENCY: ICD-10-CM

## 2025-02-05 DIAGNOSIS — I42.8 NICM (NONISCHEMIC CARDIOMYOPATHY): ICD-10-CM

## 2025-02-05 DIAGNOSIS — R73.03 PREDIABETES: ICD-10-CM

## 2025-02-05 DIAGNOSIS — I42.0 DILATED CARDIOMYOPATHY: ICD-10-CM

## 2025-02-05 DIAGNOSIS — I10 PRIMARY HYPERTENSION: Primary | ICD-10-CM

## 2025-02-05 RX ORDER — TORSEMIDE 10 MG/1
10 TABLET ORAL DAILY PRN
COMMUNITY

## 2025-02-05 RX ORDER — CARVEDILOL 12.5 MG/1
25 TABLET ORAL 2 TIMES DAILY WITH MEALS
Start: 2025-02-05 | End: 2026-01-31

## 2025-02-05 RX ORDER — VALSARTAN 40 MG/1
80 TABLET ORAL DAILY
Start: 2025-02-05

## 2025-02-05 NOTE — PROGRESS NOTES
Established Patient Office Visit    Patient Name: Nae Sanchez  : 1965   MRN: [id number]   Care Team: Patient Care Team:  Sandee Keen PA-C as PCP - General (Physician Assistant)  Petey Leigh MD as Cardiologist (Cardiology)  Joshua Brown APRN (Nurse Practitioner)  Cecy Coles MD (Nephrology)    Chief Complaint   Patient presents with    Chronic systolic heart failure     HPI: Nae Sanchez is a 59 y.o. female with a history of relapse of tobacco use, COPD, and nonischemic, dilated cardiomyopathy, resolved apical thrombus who presents today for routine follow-up.  Since her last visit with me in February of last year she reports her weights have been stable.  She does have intermittent leg swelling and has been taking torsemide a few times a week.  Her blood pressure has been running higher at home and she does endorse dyspnea on exertion.  She does also have some left arm pain/numbness however this is limited to below the elbow.  Her breathing has been feeling worse.  She is established with nephrology at  and has been on carvedilol 25 mg twice daily now for about 3 months but her blood pressure remains high.  She does have a CPAP machine at home and reports good adherence.    Subjective   Review of Systems   Constitutional:  Negative for activity change and fatigue.   Respiratory:  Positive for shortness of breath.    Cardiovascular:  Negative for chest pain, palpitations and leg swelling.   Musculoskeletal:  Positive for arthralgias and joint swelling.     Social History     Tobacco Use   Smoking Status Every Day    Current packs/day: 0.50    Average packs/day: 1 pack/day for 42.8 years (42.0 ttl pk-yrs)    Types: Cigarettes    Start date: 1982    Last attempt to quit: 2022    Passive exposure: Never   Smokeless Tobacco Never      No Known Allergies      Current Outpatient Medications:     amphetamine-dextroamphetamine XR (ADDERALL XR) 30 MG 24 hr  "capsule, Take 1 capsule by mouth Every Morning, Disp: , Rfl:     aspirin 81 MG EC tablet, Take 1 tablet by mouth Daily., Disp: 30 tablet, Rfl: 6    atorvastatin (LIPITOR) 40 MG tablet, TAKE 1 TABLET BY MOUTH EVERY DAY AT NIGHT, Disp: 90 tablet, Rfl: 1    Calcium Carbonate-Vitamin D (Oyster Shell Calcium/D) 500-5 MG-MCG tablet, Take 1 tablet by mouth Daily., Disp: , Rfl:     carvedilol (COREG) 12.5 MG tablet, Take 2 tablets by mouth 2 (Two) Times a Day With Meals for 360 days., Disp: , Rfl:     Cyanocobalamin (Vitamin B-12) 1000 MCG sublingual tablet, Place 1 tablet under the tongue Daily., Disp: 90 each, Rfl: 3    desvenlafaxine (PRISTIQ) 100 MG 24 hr tablet, Take 1 tablet by mouth Daily., Disp: , Rfl:     famotidine (PEPCID) 20 MG tablet, TAKE 1 TABLET BY MOUTH TWICE A DAY, Disp: 180 tablet, Rfl: 1    folic acid (FOLVITE) 1 MG tablet, TAKE 1 TABLET BY MOUTH EVERY DAY, Disp: 90 tablet, Rfl: 1    gabapentin (NEURONTIN) 300 MG capsule, Take 1 capsule by mouth Daily., Disp: 90 capsule, Rfl: 0    Jardiance 10 MG tablet tablet, TAKE 1 TABLET BY MOUTH EVERY DAY, Disp: 90 tablet, Rfl: 1    mirtazapine (REMERON) 15 MG tablet, Take 1 tablet by mouth Daily., Disp: , Rfl:     Stiolto Respimat 2.5-2.5 MCG/ACT aerosol solution inhaler, Inhale 2 puffs., Disp: , Rfl:     valsartan (DIOVAN) 40 MG tablet, Take 2 tablets by mouth Daily., Disp: , Rfl:     torsemide (DEMADEX) 10 MG tablet, Take 1 tablet by mouth Daily As Needed (leg swelling)., Disp: , Rfl:     Objective     Vitals:    02/05/25 1012   BP: 148/92   BP Location: Right arm   Patient Position: Sitting   Cuff Size: Adult   Pulse: 78   SpO2: 95%   Weight: 81.5 kg (179 lb 9.6 oz)   Height: 165.1 cm (65\")     Body mass index is 29.89 kg/m².  Gen: well developed, white female, sitting up on exam table  HEENT: MMM, sclera anicteric, conjunctiva normal  CV: regular rate, regular rhythm, no murmurs or rubs, normal S1, S2. 2+ radial pulses  Pulm: RA, normal work of breathing, no " wheezes, rales, rhonchi  Ext: normal bulk for age, normal tone, trace edema  Neuro: alert, oriented, face symmetrical, moving all extremities well  Psych: normal mood, appropriate affect    RESULTS:     [facility] 9/1/2022  FINAL IMPRESSION:  Angulated/tortuous proximal LCx with nonocclusive (up to 50%) plaque with normal IFR of 1.0.  Angiographically near normal left main, the LAD and the dominant RCA.  Dilated, probably nonischemic cardiomyopathy with severe left ventricular systolic dysfunction, estimated ejection fraction 30%.  Left ventricular apical filling defect highly suggestive of apical thrombus.    12/6/22 - Transthoracic Echo Complete W/ Cont if Necessary Per Protocol      The left ventricular cavity is mildly dilated.    Left ventricular systolic function is mildly decreased. Calculated left ventricular EF = 41%    Left ventricular diastolic function is consistent with (grade Ia w/high LAP) impaired relaxation.    EF is improved from previous.    No LV thrombus seen with LV opacification agent    8/30/22 - TTE  Estimated left ventricular EF = 25% Left ventricular ejection fraction appears to be 21 - 25%. Left ventricular systolic function is severely decreased.  There is a small left pleural effusion.  The following left ventricular wall segments are hypokinetic: mid anterior, apical anterior, basal anterolateral, mid anterolateral, apical lateral, basal inferolateral, mid inferolateral, mid anteroseptal and basal anterior. The following left ventricular wall segments are akinetic: apical inferior, basal inferior, mid inferior, apical septal, basal inferoseptal, mid inferoseptal and apex.  The left ventricular cavity is mild to moderately dilated.  The findings are consistent with dilated cardiomyopathy.  Left ventricular diastolic function is consistent with (grade I) impaired relaxation.  Left atrial volume is mildly increased.  Estimated right ventricular systolic pressure from tricuspid regurgitation is  normal (<35 mmHg).  Normal right ventricular cavity size, wall thickness and septal motion noted with moderately reduced right ventricular systolic function.  No evidence of left ventricular thrombus or mass present.  No evidence of pulmonary hypertension is present.  Mild to moderate aortic valve regurgitation is present.    Labs:  Lab Results   Component Value Date    CREATININE 2.19 (H) 06/28/2024    BUN 28 (H) 06/28/2024     06/28/2024    K 5.0 06/28/2024     06/28/2024    CO2 23.3 06/28/2024     Renal function panel   10/05/23 / Cr 1.87, BUN 27, K 4.7  07/07/23 - Cr 2.23, BUN 22, K 4.2  01/12/23 - Cr 1.61, potassium 5.1, total CO2 31    Lab Results   Component Value Date    WBC 11.62 (H) 10/09/2024    HGB 12.9 10/09/2024    HCT 40.4 10/09/2024    MCV 92 10/09/2024     (H) 10/09/2024     Lab Results   Component Value Date    HGBA1C 6.4 (A) 12/19/2024     Lab Results   Component Value Date    CHOL 139 06/28/2024    TRIG 106 06/28/2024    HDL 47 06/28/2024    LDL 73 06/28/2024     Most recent PCP note, imaging tests, and labs reviewed.    Procedures    Assessment & Plan       ICD-10-CM ICD-9-CM   1. Primary hypertension  I10 401.9   2. Chronic systolic heart failure  I50.22 428.22   3. Chronic systolic heart failure (CMS/HCC)  I50.22 428.22   4. Prediabetes  R73.03 790.29   5. Dilated cardiomyopathy  I42.0 425.4   6. NICM (nonischemic cardiomyopathy)  I42.8 425.4   7. Renal insufficiency  N28.9 593.9       Chronic heart failure with reduced ejection fraction, EF improved to 41%  Nonischemic cardiomyopathy  LV thrombus, resolved   - GDMT with carvedilol, valsartan, empagliflozin    - PRN torsemide   - BP above goal, will try to increase valsartan hand for a week and recheck BMP next week   - Recheck TTE    Primary hypertension, acceptably controlled   - Above goal, medicine changes as above   - May need to coordinate with nephrology depending on response to increased ARB    HLD   - continue  statin    Stage III CKD   - follows with nephrology at  (Dr. Cecy Coles)    Return in about 3 months (around 5/5/2025).    KAMALJIT Leigh MD, MS  02/05/25    [facility]  [address] [place] [id number] [phone]

## 2025-02-05 NOTE — PATIENT INSTRUCTIONS
Increase valsartan to 2 tablets (80mg) for the next week and we will recheck you kidney function with blood work in the middle of next week.

## 2025-02-11 LAB
NCCN CRITERIA FLAG: NORMAL
TYRER CUZICK SCORE: 6.8

## 2025-02-17 ENCOUNTER — HOSPITAL ENCOUNTER (EMERGENCY)
Facility: HOSPITAL | Age: 60
Discharge: HOME | End: 2025-02-17
Attending: EMERGENCY MEDICINE | Admitting: EMERGENCY MEDICINE
Payer: COMMERCIAL

## 2025-02-17 ENCOUNTER — APPOINTMENT (OUTPATIENT)
Dept: GENERAL RADIOLOGY | Facility: HOSPITAL | Age: 60
End: 2025-02-17
Payer: COMMERCIAL

## 2025-02-17 VITALS
WEIGHT: 180 LBS | HEIGHT: 65 IN | DIASTOLIC BLOOD PRESSURE: 85 MMHG | RESPIRATION RATE: 16 BRPM | TEMPERATURE: 99.1 F | OXYGEN SATURATION: 91 % | BODY MASS INDEX: 29.99 KG/M2 | SYSTOLIC BLOOD PRESSURE: 129 MMHG | HEART RATE: 87 BPM

## 2025-02-17 DIAGNOSIS — J18.9 PNEUMONIA OF RIGHT UPPER LOBE DUE TO INFECTIOUS ORGANISM: Primary | ICD-10-CM

## 2025-02-17 DIAGNOSIS — J44.1 COPD EXACERBATION: ICD-10-CM

## 2025-02-17 LAB
ALBUMIN SERPL-MCNC: 4.7 G/DL (ref 3.5–5.2)
ALBUMIN/GLOB SERPL: 1.8 G/DL
ALP SERPL-CCNC: 125 U/L (ref 39–117)
ALT SERPL W P-5'-P-CCNC: 12 U/L (ref 1–33)
ANION GAP SERPL CALCULATED.3IONS-SCNC: 9 MMOL/L (ref 5–15)
AST SERPL-CCNC: 16 U/L (ref 1–32)
BACTERIA UR QL AUTO: ABNORMAL /HPF
BASOPHILS # BLD AUTO: 0.18 10*3/MM3 (ref 0–0.2)
BASOPHILS NFR BLD AUTO: 1.2 % (ref 0–1.5)
BILIRUB SERPL-MCNC: 0.5 MG/DL (ref 0–1.2)
BILIRUB UR QL STRIP: NEGATIVE
BUN SERPL-MCNC: 15 MG/DL (ref 6–20)
BUN/CREAT SERPL: 8.7 (ref 7–25)
CALCIUM SPEC-SCNC: 9.7 MG/DL (ref 8.6–10.5)
CHLORIDE SERPL-SCNC: 102 MMOL/L (ref 98–107)
CLARITY UR: ABNORMAL
CO2 SERPL-SCNC: 32 MMOL/L (ref 22–29)
COLOR UR: YELLOW
CREAT SERPL-MCNC: 1.72 MG/DL (ref 0.57–1)
D-LACTATE SERPL-SCNC: 1.2 MMOL/L (ref 0.5–2)
DEPRECATED RDW RBC AUTO: 54.5 FL (ref 37–54)
EGFRCR SERPLBLD CKD-EPI 2021: 33.9 ML/MIN/1.73
EOSINOPHIL # BLD AUTO: 0.47 10*3/MM3 (ref 0–0.4)
EOSINOPHIL NFR BLD AUTO: 3.1 % (ref 0.3–6.2)
ERYTHROCYTE [DISTWIDTH] IN BLOOD BY AUTOMATED COUNT: 17.1 % (ref 12.3–15.4)
FLUAV RNA RESP QL NAA+PROBE: NOT DETECTED
FLUBV RNA RESP QL NAA+PROBE: NOT DETECTED
GLOBULIN UR ELPH-MCNC: 2.6 GM/DL
GLUCOSE SERPL-MCNC: 107 MG/DL (ref 65–99)
GLUCOSE UR STRIP-MCNC: NEGATIVE MG/DL
HCT VFR BLD AUTO: 45 % (ref 34–46.6)
HGB BLD-MCNC: 14.3 G/DL (ref 12–15.9)
HGB UR QL STRIP.AUTO: NEGATIVE
HOLD SPECIMEN: NORMAL
HYALINE CASTS UR QL AUTO: ABNORMAL /LPF
IMM GRANULOCYTES # BLD AUTO: 0.12 10*3/MM3 (ref 0–0.05)
IMM GRANULOCYTES NFR BLD AUTO: 0.8 % (ref 0–0.5)
KETONES UR QL STRIP: NEGATIVE
LEUKOCYTE ESTERASE UR QL STRIP.AUTO: ABNORMAL
LIPASE SERPL-CCNC: 54 U/L (ref 13–60)
LYMPHOCYTES # BLD AUTO: 3.57 10*3/MM3 (ref 0.7–3.1)
LYMPHOCYTES NFR BLD AUTO: 23.7 % (ref 19.6–45.3)
MCH RBC QN AUTO: 28 PG (ref 26.6–33)
MCHC RBC AUTO-ENTMCNC: 31.8 G/DL (ref 31.5–35.7)
MCV RBC AUTO: 88.1 FL (ref 79–97)
MONOCYTES # BLD AUTO: 0.98 10*3/MM3 (ref 0.1–0.9)
MONOCYTES NFR BLD AUTO: 6.5 % (ref 5–12)
NEUTROPHILS NFR BLD AUTO: 64.7 % (ref 42.7–76)
NEUTROPHILS NFR BLD AUTO: 9.77 10*3/MM3 (ref 1.7–7)
NITRITE UR QL STRIP: NEGATIVE
NRBC BLD AUTO-RTO: 0.1 /100 WBC (ref 0–0.2)
PH UR STRIP.AUTO: 6 [PH] (ref 5–8)
PLATELET # BLD AUTO: 458 10*3/MM3 (ref 140–450)
PMV BLD AUTO: 11 FL (ref 6–12)
POTASSIUM SERPL-SCNC: 4.9 MMOL/L (ref 3.5–5.2)
PROT SERPL-MCNC: 7.3 G/DL (ref 6–8.5)
PROT UR QL STRIP: ABNORMAL
RBC # BLD AUTO: 5.11 10*6/MM3 (ref 3.77–5.28)
RBC # UR STRIP: ABNORMAL /HPF
REF LAB TEST METHOD: ABNORMAL
RSV RNA RESP QL NAA+PROBE: NOT DETECTED
SARS-COV-2 RNA RESP QL NAA+PROBE: NOT DETECTED
SODIUM SERPL-SCNC: 143 MMOL/L (ref 136–145)
SP GR UR STRIP: 1.01 (ref 1–1.03)
SQUAMOUS #/AREA URNS HPF: ABNORMAL /HPF
UROBILINOGEN UR QL STRIP: ABNORMAL
WBC # UR STRIP: ABNORMAL /HPF
WBC NRBC COR # BLD AUTO: 15.09 10*3/MM3 (ref 3.4–10.8)
WHOLE BLOOD HOLD COAG: NORMAL
WHOLE BLOOD HOLD SPECIMEN: NORMAL

## 2025-02-17 PROCEDURE — 85025 COMPLETE CBC W/AUTO DIFF WBC: CPT | Performed by: PHYSICIAN ASSISTANT

## 2025-02-17 PROCEDURE — 94640 AIRWAY INHALATION TREATMENT: CPT

## 2025-02-17 PROCEDURE — 99283 EMERGENCY DEPT VISIT LOW MDM: CPT

## 2025-02-17 PROCEDURE — 87637 SARSCOV2&INF A&B&RSV AMP PRB: CPT | Performed by: PHYSICIAN ASSISTANT

## 2025-02-17 PROCEDURE — 83605 ASSAY OF LACTIC ACID: CPT | Performed by: PHYSICIAN ASSISTANT

## 2025-02-17 PROCEDURE — 36415 COLL VENOUS BLD VENIPUNCTURE: CPT

## 2025-02-17 PROCEDURE — 71045 X-RAY EXAM CHEST 1 VIEW: CPT

## 2025-02-17 PROCEDURE — 81001 URINALYSIS AUTO W/SCOPE: CPT | Performed by: PHYSICIAN ASSISTANT

## 2025-02-17 PROCEDURE — 83690 ASSAY OF LIPASE: CPT | Performed by: PHYSICIAN ASSISTANT

## 2025-02-17 PROCEDURE — 80053 COMPREHEN METABOLIC PANEL: CPT | Performed by: PHYSICIAN ASSISTANT

## 2025-02-17 PROCEDURE — 63710000001 PREDNISONE PER 1 MG: Performed by: PHYSICIAN ASSISTANT

## 2025-02-17 RX ORDER — AMOXICILLIN 875 MG/1
875 TABLET, COATED ORAL 2 TIMES DAILY
Qty: 14 TABLET | Refills: 0 | Status: SHIPPED | OUTPATIENT
Start: 2025-02-17 | End: 2025-02-24

## 2025-02-17 RX ORDER — AMOXICILLIN 875 MG/1
875 TABLET, COATED ORAL ONCE
Status: COMPLETED | OUTPATIENT
Start: 2025-02-17 | End: 2025-02-17

## 2025-02-17 RX ORDER — PREDNISONE 20 MG/1
60 TABLET ORAL ONCE
Status: COMPLETED | OUTPATIENT
Start: 2025-02-17 | End: 2025-02-17

## 2025-02-17 RX ORDER — IPRATROPIUM BROMIDE AND ALBUTEROL SULFATE 2.5; .5 MG/3ML; MG/3ML
3 SOLUTION RESPIRATORY (INHALATION)
Status: DISCONTINUED | OUTPATIENT
Start: 2025-02-17 | End: 2025-02-17 | Stop reason: HOSPADM

## 2025-02-17 RX ORDER — PREDNISONE 20 MG/1
20 TABLET ORAL 2 TIMES DAILY
Qty: 10 TABLET | Refills: 0 | Status: SHIPPED | OUTPATIENT
Start: 2025-02-17 | End: 2025-02-22

## 2025-02-17 RX ORDER — DOXYCYCLINE 100 MG/1
100 CAPSULE ORAL 2 TIMES DAILY
Qty: 14 CAPSULE | Refills: 0 | Status: SHIPPED | OUTPATIENT
Start: 2025-02-17 | End: 2025-02-24

## 2025-02-17 RX ORDER — SODIUM CHLORIDE 0.9 % (FLUSH) 0.9 %
10 SYRINGE (ML) INJECTION AS NEEDED
Status: DISCONTINUED | OUTPATIENT
Start: 2025-02-17 | End: 2025-02-17

## 2025-02-17 RX ORDER — AZITHROMYCIN 250 MG/1
500 TABLET, FILM COATED ORAL ONCE
Status: COMPLETED | OUTPATIENT
Start: 2025-02-17 | End: 2025-02-17

## 2025-02-17 RX ORDER — IPRATROPIUM BROMIDE AND ALBUTEROL SULFATE 2.5; .5 MG/3ML; MG/3ML
3 SOLUTION RESPIRATORY (INHALATION) EVERY 4 HOURS PRN
Qty: 90 ML | Refills: 0 | Status: SHIPPED | OUTPATIENT
Start: 2025-02-17

## 2025-02-17 RX ORDER — METHYLPREDNISOLONE SODIUM SUCCINATE 125 MG/2ML
125 INJECTION, POWDER, LYOPHILIZED, FOR SOLUTION INTRAMUSCULAR; INTRAVENOUS ONCE
Status: DISCONTINUED | OUTPATIENT
Start: 2025-02-17 | End: 2025-02-17

## 2025-02-17 RX ADMIN — IPRATROPIUM BROMIDE AND ALBUTEROL SULFATE 3 ML: 2.5; .5 SOLUTION RESPIRATORY (INHALATION) at 19:12

## 2025-02-17 RX ADMIN — PREDNISONE 60 MG: 20 TABLET ORAL at 19:17

## 2025-02-17 RX ADMIN — AZITHROMYCIN 500 MG: 250 TABLET, FILM COATED ORAL at 19:17

## 2025-02-17 RX ADMIN — AMOXICILLIN 875 MG: 875 TABLET, FILM COATED ORAL at 19:17

## 2025-02-17 NOTE — Clinical Note
[facility] EMERGENCY DEPARTMENT  [address]  [facility] [address]  Phone: [phone]    Nae Sanchez was seen and treated in our emergency department on 2/17/2025.  She may return to work on 02/19/2025.         Thank you for choosing [facility].    Asuncion Durand RN

## 2025-02-17 NOTE — ED PROVIDER NOTES
Subjective  History of Present Illness:    Chief Complaint: Cough, congestion, wheezing  History of Present Illness: 59-year-old with cough and congestion, she is a smoker, has a history of COPD, cardiomyopathy, coronary disease, hypertension, hyperlipidemia.  She states she has not been feeling well for 2 weeks, she has been taking over-the-counter medication with no relief.  She does use an inhaler daily, but this has not helped.  Onset: Gradual  Duration: 2 weeks  Exacerbating / Alleviating factors: Cough, congestion, wheezing  Associated symptoms: Fatigue      Nurses Notes reviewed and agree, including vitals, allergies, social history and prior medical history.     REVIEW OF SYSTEMS: All systems reviewed and not pertinent unless noted.    Review of Systems   HENT:  Positive for congestion.    Respiratory:  Positive for cough, shortness of breath and wheezing.    Neurological:  Positive for weakness.   All other systems reviewed and are negative.      Past Medical History:   Diagnosis Date    Abnormal Pap smear of cervix     ADHD (attention deficit hyperactivity disorder)     Anxiety     CHF (congestive heart failure) 2022    Chlamydia     Chronic obstructive pulmonary disease (COPD) 2022    Coronary artery disease 2022    Depression     Diverticulosis     Heart murmur 1965    Hypertension 2022    Myocardial infarction 2022    Osteopenia     Peptic ulceration     Renal insufficiency     Renal insufficiency 2022    Rh incompatibility     Sleep apnea  noticed    Tobacco use 2022    Urinary tract infection     Varicella        Allergies:    Patient has no known allergies.      Past Surgical History:   Procedure Laterality Date    CARDIAC CATHETERIZATION N/A 2022    Procedure: Left Heart Cath;  Surgeon: Greta Sanchez MD;  Location: [facility] CATH INVASIVE LOCATION;  Service: Cardiology;  Laterality: N/A;     SECTION  1997      "SECTION WITH TUBAL  09/21/1997    COLONOSCOPY  2019    COLONOSCOPY N/A 01/10/2023    Procedure: COLONOSCOPY;  Surgeon: Rebel De Paz MD;  Location: [facility] ENDOSCOPY;  Service: Gastroenterology;  Laterality: N/A;    FOOT SURGERY Left     FRACTURE SURGERY  05/25/21    Left foot    TUBAL ABDOMINAL LIGATION  09/21/97         Social History     Socioeconomic History    Marital status:     Number of children: 2    Highest education level: Some college, no degree   Tobacco Use    Smoking status: Every Day     Current packs/day: 0.50     Average packs/day: 1 pack/day for 42.8 years (42.0 ttl pk-yrs)     Types: Cigarettes     Start date: 1/1/1982     Last attempt to quit: 8/29/2022     Passive exposure: Never    Smokeless tobacco: Never   Vaping Use    Vaping status: Never Used   Substance and Sexual Activity    Alcohol use: No    Drug use: No    Sexual activity: Not Currently     Partners: Male     Birth control/protection: Tubal ligation         Family History   Problem Relation Age of Onset    Anxiety disorder Mother     Hypertension Father     Diabetes Father     No Known Problems Sister     Heart disease Brother     Early death Brother     Mental illness Daughter     Colon cancer Maternal Grandmother     Breast cancer Neg Hx        Objective  Physical Exam:  /85   Pulse 87   Temp 99.1 °F (37.3 °C) (Oral)   Resp 16   Ht 165.1 cm (65\")   Wt 81.6 kg (180 lb)   SpO2 91%   BMI 29.95 kg/m²      Physical Exam  Vitals and nursing note reviewed.   Constitutional:       Appearance: She is well-developed.   HENT:      Head: Normocephalic and atraumatic.   Cardiovascular:      Rate and Rhythm: Normal rate and regular rhythm.   Pulmonary:      Effort: Pulmonary effort is normal.      Breath sounds: Wheezing present.   Abdominal:      Palpations: Abdomen is soft.   Musculoskeletal:         General: Normal range of motion.      Cervical back: Normal range of motion and neck supple.   Skin:     General: Skin " is warm and dry.   Neurological:      Mental Status: She is alert and oriented to person, place, and time.      Deep Tendon Reflexes: Reflexes are normal and symmetric.           Procedures    ED Course:    Chest x-ray interpretation by me: Right upper lobe pneumonia    ED Course as of 02/17/25 2255   Mon Feb 17, 2025   1448 Review of previous  non ED visits, prior labs, prior imaging, available notes from prior evaluations or visits with specialists, medication list, allergies, past medical history, past surgical history      Review of recent records from 2/5/2025, with cardiology [CS]   1711 Leukocytes, UA(!): Large (3+) [CS]   1711 Bacteria, UA(!): 4+ [CS]   1711 WBC, UA(!): Too Numerous to Count  I Personally reviewed all laboratory studies performed in the emergency department   Bacteria, white blood cells, leukocytes in urinalysis [CS]      ED Course User Index  [CS] Eliazar Hall Jr., [place]AD[place]       Lab Results (last 24 hours)       Procedure Component Value Units Date/Time    COVID-19, FLU A/B, RSV PCR 1 HR TAT - Swab, Nasopharynx [[id number]]  (Normal) Collected: 02/17/25 1525    Specimen: Swab from Nasopharynx Updated: 02/17/25 1655     COVID19 Not Detected     Influenza A PCR Not Detected     Influenza B PCR Not Detected     RSV, PCR Not Detected    Narrative:      Fact sheet for providers: https://www.fda.gov/media/119359/download    Fact sheet for patients: https://www.fda.gov/media/756304/download    Test performed by PCR.    Urinalysis With Microscopic If Indicated (No Culture) - Urine, Clean Catch [[id number]]  (Abnormal) Collected: 02/17/25 1526    Specimen: Urine, Clean Catch Updated: 02/17/25 1558     Color, UA Yellow     Appearance, UA Cloudy     pH, UA 6.0     Specific Gravity, UA 1.010     Glucose, UA Negative     Ketones, UA Negative     Bilirubin, UA Negative     Blood, UA Negative     Protein, UA Trace     Leuk Esterase, UA Large (3+)     Nitrite, UA Negative     Urobilinogen, UA 0.2 E.U./dL     Urinalysis, Microscopic Only - Urine, Clean Catch [[id number]]  (Abnormal) Collected: 02/17/25 1526    Specimen: Urine, Clean Catch Updated: 02/17/25 1558     RBC, UA 0-2 /HPF      WBC, UA Too Numerous to Count /HPF      Bacteria, UA 4+ /HPF      Squamous Epithelial Cells, UA 7-12 /HPF      Hyaline Casts, UA 0-6 /LPF      Methodology Automated Microscopy    CBC & Differential [[id number]]  (Abnormal) Collected: 02/17/25 1530    Specimen: Blood Updated: 02/17/25 1549    Narrative:      The following orders were created for panel order CBC & Differential.  Procedure                               Abnormality         Status                     ---------                               -----------         ------                     CBC Auto Differential[[id number]]        Abnormal            Final result                 Please view results for these tests on the individual orders.    Comprehensive Metabolic Panel [[id number]]  (Abnormal) Collected: 02/17/25 1530    Specimen: Blood Updated: 02/17/25 1614     Glucose 107 mg/dL      BUN 15 mg/dL      Creatinine 1.72 mg/dL      Sodium 143 mmol/L      Potassium 4.9 mmol/L      Chloride 102 mmol/L      CO2 32.0 mmol/L      Calcium 9.7 mg/dL      Total Protein 7.3 g/dL      Albumin 4.7 g/dL      ALT (SGPT) 12 U/L      AST (SGOT) 16 U/L      Alkaline Phosphatase 125 U/L      Total Bilirubin 0.5 mg/dL      Globulin 2.6 gm/dL      Comment: Calculated Result        A/G Ratio 1.8 g/dL      BUN/Creatinine Ratio 8.7     Anion Gap 9.0 mmol/L      eGFR 33.9 mL/min/1.73     Narrative:      GFR Categories in Chronic Kidney Disease (CKD)      GFR Category          GFR (mL/min/1.73)    Interpretation  G1                     90 or greater         Normal or high (1)  G2                      60-89                Mild decrease (1)  G3a                   45-59                Mild to moderate decrease  G3b                   30-44                Moderate to severe decrease  G4                     15-29                Severe decrease  G5                    14 or less           Kidney failure          (1)In the absence of evidence of kidney disease, neither GFR category G1 or G2 fulfill the criteria for CKD.    eGFR calculation 2021 CKD-EPI creatinine equation, which does not include race as a factor    Lipase [[id number]]  (Normal) Collected: 02/17/25 1530    Specimen: Blood Updated: 02/17/25 1614     Lipase 54 U/L     Lactic Acid, Plasma [[id number]]  (Normal) Collected: 02/17/25 1530    Specimen: Blood Updated: 02/17/25 1606     Lactate 1.2 mmol/L      Comment: Falsely depressed results may occur on samples drawn from patients receiving N-Acetylcysteine (NAC) or Metamizole.       CBC Auto Differential [[id number]]  (Abnormal) Collected: 02/17/25 1530    Specimen: Blood Updated: 02/17/25 1549     WBC 15.09 10*3/mm3      RBC 5.11 10*6/mm3      Hemoglobin 14.3 g/dL      Hematocrit 45.0 %      MCV 88.1 fL      MCH 28.0 pg      MCHC 31.8 g/dL      RDW 17.1 %      RDW-SD 54.5 fl      MPV 11.0 fL      Platelets 458 10*3/mm3      Neutrophil % 64.7 %      Lymphocyte % 23.7 %      Monocyte % 6.5 %      Eosinophil % 3.1 %      Basophil % 1.2 %      Immature Grans % 0.8 %      Neutrophils, Absolute 9.77 10*3/mm3      Lymphocytes, Absolute 3.57 10*3/mm3      Monocytes, Absolute 0.98 10*3/mm3      Eosinophils, Absolute 0.47 10*3/mm3      Basophils, Absolute 0.18 10*3/mm3      Immature Grans, Absolute 0.12 10*3/mm3      nRBC 0.1 /100 WBC              XR Chest 1 View    Result Date: 2/17/2025  XR CHEST 1 VW Date of Exam: 2/17/2025 5:58 PM EST Indication: cough Comparison: 8/31/2022. 8/30/2022. 12/25/2017 Findings: Cardiomegaly is present. There is mild blunting of the costophrenic angles. The pulmonary vasculature is within normal limits. There is focal consolidation in the right suprahilar region.     Impression: Impression: 1. Cardiomegaly. Mild blunting of the costophrenic angles may represent small pleural effusions. 2.  Focal consolidation in the right suprahilar region possibly secondary to pneumonia. Recommend a follow-up examination after treatment to confirm resolution. Electronically Signed: Matthew Conte MD  2/17/2025 6:14 PM EST  Workstation ID: [id number]                                                                   Medical Decision Making  Problems Addressed:  COPD exacerbation: complicated acute illness or injury  Pneumonia of right upper lobe due to infectious organism: complicated acute illness or injury    Amount and/or Complexity of Data Reviewed  External Data Reviewed: labs, radiology and notes.  Labs: ordered. Decision-making details documented in ED Course.  Radiology: ordered and independent interpretation performed. Decision-making details documented in ED Course.    Risk  Prescription drug management.  Risk Details: 59-year-old female presented with cough, congestion, wheezing.  History of COPD, on my exam, she did have wheezing with a cough.  Labs concerned about bronchitis, pneumonia, COVID, flu.  Labs were drawn and a chest x-ray were performed, chest x-ray was consistent with a right upper lobe pneumonia, respiratory panel was negative.  She responded well to neb treatments, shared decision making was discussed, she was continued on her current treatment and I added a home nebulizer machine as well as albuterol ipratropium.  She was given signs and symptoms look for return and recommend close follow-up          Final diagnoses:   Pneumonia of right upper lobe due to infectious organism   COPD exacerbation           Disposition DISCHARGE    Patient discharged in stable condition.    Reviewed implications of results, diagnosis, meds, responsibility to follow up, warning signs and symptoms of possible worsening, potential complications and reasons to return to ER.    Patient/Family voiced understanding of above instructions.    Discussed plan for discharge, as there is no emergent indication for admission.   Pt/family is agreeable and understands need for follow up and possible repeat testing.  Pt/family is aware that discharge does not mean that nothing is wrong but that it indicates no emergency is currently present that requires admission and they must continue care with follow-up as given below or with a physician of their choice.     FOLLOW-UP  Sandee Keen PA-C  [address] [address]  [phone]    Schedule an appointment as soon as possible for a visit       [facility] EMERGENCY DEPARTMENT  [address] [address] [phone]    If symptoms worsen         Medication List        New Prescriptions      amoxicillin 875 MG tablet  Commonly known as: AMOXIL  Take 1 tablet by mouth 2 (Two) Times a Day for 7 days.     doxycycline 100 MG capsule  Commonly known as: MONODOX  Take 1 capsule by mouth 2 (Two) Times a Day for 7 days.     ipratropium-albuterol 0.5-2.5 mg/3 ml nebulizer  Commonly known as: DUO-NEB  Take 3 mL by nebulization Every 4 (Four) Hours As Needed for Wheezing.     predniSONE 20 MG tablet  Commonly known as: DELTASONE  Take 1 tablet by mouth 2 (Two) Times a Day for 5 days.               Where to Get Your Medications        These medications were sent to [facility]/pharmacy #[id number] - [place], [place] - [address] AT [facility] - [phone]  - [phone]   [address] [address]      Phone: [phone]   amoxicillin 875 MG tablet  doxycycline 100 MG capsule  ipratropium-albuterol 0.5-2.5 mg/3 ml nebulizer  predniSONE 20 MG tablet             Eliazar Hall Jr., PA-C  02/17/25 2011

## 2025-02-25 ENCOUNTER — HOSPITAL ENCOUNTER (OUTPATIENT)
Dept: MAMMOGRAPHY | Facility: HOSPITAL | Age: 60
Discharge: HOME | End: 2025-02-25
Admitting: NURSE PRACTITIONER
Payer: COMMERCIAL

## 2025-02-25 DIAGNOSIS — Z01.419 WELL WOMAN EXAM WITH ROUTINE GYNECOLOGICAL EXAM: ICD-10-CM

## 2025-02-25 PROCEDURE — 77067 SCR MAMMO BI INCL CAD: CPT

## 2025-02-25 PROCEDURE — 77063 BREAST TOMOSYNTHESIS BI: CPT

## 2025-02-27 PROCEDURE — 77063 BREAST TOMOSYNTHESIS BI: CPT | Performed by: RADIOLOGY

## 2025-02-27 PROCEDURE — 77067 SCR MAMMO BI INCL CAD: CPT | Performed by: RADIOLOGY

## 2025-03-14 ENCOUNTER — HOSPITAL ENCOUNTER (OUTPATIENT)
Dept: CARDIOLOGY | Facility: HOSPITAL | Age: 60
Discharge: HOME | End: 2025-03-14
Admitting: INTERNAL MEDICINE
Payer: COMMERCIAL

## 2025-03-14 VITALS
BODY MASS INDEX: 29.97 KG/M2 | HEIGHT: 65 IN | WEIGHT: 179.9 LBS | DIASTOLIC BLOOD PRESSURE: 100 MMHG | SYSTOLIC BLOOD PRESSURE: 153 MMHG

## 2025-03-14 DIAGNOSIS — I50.22 CHRONIC SYSTOLIC HEART FAILURE: ICD-10-CM

## 2025-03-14 LAB
AORTIC DIMENSIONLESS INDEX: 0.63 (DI)
ASCENDING AORTA: 3.2 CM
AV MEAN PRESS GRAD SYS DOP V1V2: 3.5 MMHG
AV VMAX SYS DOP: 126.7 CM/SEC
BH CV ECHO LEFT VENTRICLE GLOBAL LONGITUDINAL STRAIN: -20.1 %
BH CV ECHO MEAS - AI P1/2T: 694 MSEC
BH CV ECHO MEAS - AO MAX PG: 6.4 MMHG
BH CV ECHO MEAS - AO ROOT DIAM: 3 CM
BH CV ECHO MEAS - AO V2 VTI: 24.6 CM
BH CV ECHO MEAS - AVA(I,D): 1.88 CM2
BH CV ECHO MEAS - IVS/LVPW: 1 CM
BH CV ECHO MEAS - IVSD: 1 CM
BH CV ECHO MEAS - LA DIMENSION: 3.9 CM
BH CV ECHO MEAS - LAT PEAK E' VEL: 5.6 CM/SEC
BH CV ECHO MEAS - LV MAX PG: 2.07 MMHG
BH CV ECHO MEAS - LV MEAN PG: 1.03 MMHG
BH CV ECHO MEAS - LV V1 MAX: 71.8 CM/SEC
BH CV ECHO MEAS - LV V1 VTI: 15.5 CM
BH CV ECHO MEAS - LVIDD: 4.8 CM
BH CV ECHO MEAS - LVIDS: 3.4 CM
BH CV ECHO MEAS - LVOT AREA: 3 CM2
BH CV ECHO MEAS - LVOT DIAM: 1.95 CM
BH CV ECHO MEAS - LVPWD: 1 CM
BH CV ECHO MEAS - MED PEAK E' VEL: 3.9 CM/SEC
BH CV ECHO MEAS - MV A MAX VEL: 81.9 CM/SEC
BH CV ECHO MEAS - MV DEC SLOPE: 173.5 CM/SEC2
BH CV ECHO MEAS - MV E MAX VEL: 48.3 CM/SEC
BH CV ECHO MEAS - MV E/A: 0.59
BH CV ECHO MEAS - MV MAX PG: 3.5 MMHG
BH CV ECHO MEAS - MV MEAN PG: 1.6 MMHG
BH CV ECHO MEAS - MV P1/2T: 100 MSEC
BH CV ECHO MEAS - MV V2 VTI: 19.3 CM
BH CV ECHO MEAS - MVA(P1/2T): 2.2 CM2
BH CV ECHO MEAS - MVA(VTI): 2.39 CM2
BH CV ECHO MEAS - PA ACC TIME: 0.08 SEC
BH CV ECHO MEAS - PA V2 MAX: 88.5 CM/SEC
BH CV ECHO MEAS - SV(LVOT): 46.2 ML
BH CV ECHO MEAS - SVI(LVOT): 24.4 ML/M2
BH CV ECHO MEAS - TAPSE (>1.6): 1.92 CM
BH CV ECHO MEASUREMENTS AVERAGE E/E' RATIO: 10.17
BH CV VAS BP RIGHT ARM: NORMAL MMHG
BH CV XLRA - RV BASE: 3.3 CM
BH CV XLRA - RV LENGTH: 6.8 CM
BH CV XLRA - RV MID: 2.5 CM
BH CV XLRA - TDI S': 14.8 CM/SEC
LEFT ATRIUM VOLUME INDEX: 37 ML/M2
LV EF BIPLANE MOD: 58 %

## 2025-03-14 PROCEDURE — 93306 TTE W/DOPPLER COMPLETE: CPT

## 2025-03-19 ENCOUNTER — OFFICE VISIT (OUTPATIENT)
Dept: FAMILY MEDICINE CLINIC | Facility: CLINIC | Age: 60
End: 2025-03-19
Payer: COMMERCIAL

## 2025-03-19 VITALS
RESPIRATION RATE: 16 BRPM | HEIGHT: 65 IN | WEIGHT: 180 LBS | SYSTOLIC BLOOD PRESSURE: 128 MMHG | DIASTOLIC BLOOD PRESSURE: 84 MMHG | HEART RATE: 98 BPM | TEMPERATURE: 96.9 F | BODY MASS INDEX: 29.99 KG/M2 | OXYGEN SATURATION: 96 %

## 2025-03-19 DIAGNOSIS — E78.2 MIXED HYPERLIPIDEMIA: ICD-10-CM

## 2025-03-19 DIAGNOSIS — I10 PRIMARY HYPERTENSION: Primary | ICD-10-CM

## 2025-03-19 DIAGNOSIS — R10.816 EPIGASTRIC ABDOMINAL TENDERNESS WITHOUT REBOUND TENDERNESS: ICD-10-CM

## 2025-03-19 DIAGNOSIS — R73.03 PREDIABETES: ICD-10-CM

## 2025-03-19 DIAGNOSIS — I50.9 CONGESTIVE HEART FAILURE, UNSPECIFIED HF CHRONICITY, UNSPECIFIED HEART FAILURE TYPE: ICD-10-CM

## 2025-03-19 DIAGNOSIS — N18.4 STAGE 4 CHRONIC KIDNEY DISEASE: ICD-10-CM

## 2025-03-19 DIAGNOSIS — Z51.81 ENCOUNTER FOR THERAPEUTIC DRUG LEVEL MONITORING: ICD-10-CM

## 2025-03-19 DIAGNOSIS — G62.9 NEUROPATHY: ICD-10-CM

## 2025-03-19 DIAGNOSIS — M85.88 OTHER SPECIFIED DISORDERS OF BONE DENSITY AND STRUCTURE, OTHER SITE: ICD-10-CM

## 2025-03-19 LAB
EXPIRATION DATE: ABNORMAL
HBA1C MFR BLD: 6.2 % (ref 4.5–5.7)
Lab: ABNORMAL

## 2025-03-19 RX ORDER — ATORVASTATIN CALCIUM 40 MG/1
40 TABLET, FILM COATED ORAL
Qty: 90 TABLET | Refills: 1 | Status: SHIPPED | OUTPATIENT
Start: 2025-03-19

## 2025-03-19 RX ORDER — BLOOD-GLUCOSE METER
EACH MISCELLANEOUS SEE ADMIN INSTRUCTIONS
COMMUNITY
Start: 2025-02-18

## 2025-03-19 RX ORDER — FAMOTIDINE 20 MG/1
20 TABLET, FILM COATED ORAL 2 TIMES DAILY
Qty: 180 TABLET | Refills: 1 | Status: SHIPPED | OUTPATIENT
Start: 2025-03-19

## 2025-03-19 RX ORDER — GABAPENTIN 300 MG/1
300 CAPSULE ORAL DAILY
Qty: 90 CAPSULE | Refills: 0 | Status: SHIPPED | OUTPATIENT
Start: 2025-03-19

## 2025-03-19 NOTE — PROGRESS NOTES
Chief Complaint   Patient presents with    Peripheral Neuropathy    Type 2 diabetes mellitus with stage 4 chronic kidney diseas    Hypertension       Nae Sanchez is a pleasant 59 y.o. female who is here for follow-up of neuropathy, hypertension, hyperlipidemia, CHF, prediabetes and stage IV kidney disease.  Patient is doing well overall.  She is compliant on all medications.  Her blood pressure is stable and well-controlled.  She is still getting good benefit from the gabapentin 300 mg nightly for neuropathy.  She is followed by kidney specialist at .  She requests order for bone scan.  It is free when she turns 60.    Past Medical History:   Diagnosis Date    Abnormal Pap smear of cervix     ADHD (attention deficit hyperactivity disorder)     Anxiety     Arthritis 2024    CHF (congestive heart failure) 2022    Chlamydia     Chronic obstructive pulmonary disease (COPD) 2022    Coronary artery disease 2022    Depression     Diverticulosis     Heart murmur 1965    Hypertension 2022    Myocardial infarction 2022    Osteopenia     Peptic ulceration     Renal insufficiency     Renal insufficiency 2022    Rh incompatibility     Sleep apnea  noticed    Tobacco use 2022    Urinary tract infection     Varicella        Past Surgical History:   Procedure Laterality Date    CARDIAC CATHETERIZATION N/A 2022    Procedure: Left Heart Cath;  Surgeon: Greta Sanchez MD;  Location:  [facility] CATH INVASIVE LOCATION;  Service: Cardiology;  Laterality: N/A;     SECTION  1997     SECTION WITH TUBAL  1997    COLONOSCOPY  2019    COLONOSCOPY N/A 01/10/2023    Procedure: COLONOSCOPY;  Surgeon: Rebel De Paz MD;  Location:  [facility] ENDOSCOPY;  Service: Gastroenterology;  Laterality: N/A;    FOOT SURGERY Left     FRACTURE SURGERY  21    Left foot    TUBAL ABDOMINAL LIGATION  97       Family History   Problem Relation  "Age of Onset    Anxiety disorder Mother     Hypertension Father     Diabetes Father     No Known Problems Sister     Heart disease Brother     Early death Brother     Mental illness Daughter     Colon cancer Maternal Grandmother     Breast cancer Neg Hx        Social History     Socioeconomic History    Marital status:     Number of children: 2    Highest education level: Some college, no degree   Tobacco Use    Smoking status: Every Day     Current packs/day: 0.50     Average packs/day: 1 pack/day for 43.7 years (42.9 ttl pk-yrs)     Types: Cigarettes     Start date: 1/1/1982     Last attempt to quit: 8/29/2022     Passive exposure: Never    Smokeless tobacco: Never   Vaping Use    Vaping status: Never Used   Substance and Sexual Activity    Alcohol use: No    Drug use: No    Sexual activity: Not Currently     Partners: Male     Birth control/protection: Tubal ligation       No Known Allergies    ROS  Review of Systems   Constitutional:  Positive for fatigue. Negative for chills and fever.   HENT:  Negative for congestion, sore throat and swollen glands.    Respiratory:  Negative for shortness of breath and wheezing.    Cardiovascular:  Negative for chest pain and leg swelling.   Gastrointestinal:  Negative for vomiting.   Neurological:  Negative for dizziness and headache.       Vitals:    03/19/25 0934   BP: 128/84   BP Location: Left arm   Patient Position: Sitting   Cuff Size: Adult   Pulse: 98   Resp: 16   Temp: 96.9 °F (36.1 °C)   TempSrc: Infrared   SpO2: 96%   Weight: 81.6 kg (180 lb)   Height: 165.1 cm (65\")   PainSc: 3    PainLoc: Foot     Body mass index is 29.95 kg/m².           Current Outpatient Medications on File Prior to Visit   Medication Sig Dispense Refill    amphetamine-dextroamphetamine XR (ADDERALL XR) 30 MG 24 hr capsule Take 1 capsule by mouth Every Morning      aspirin 81 MG EC tablet Take 1 tablet by mouth Daily. 30 tablet 6    Calcium Carbonate-Vitamin D (Oyster Shell Calcium/D) " 500-5 MG-MCG tablet Take 1 tablet by mouth Daily.      carvedilol (COREG) 12.5 MG tablet Take 2 tablets by mouth 2 (Two) Times a Day With Meals for 360 days.      Cyanocobalamin (Vitamin B-12) 1000 MCG sublingual tablet Place 1 tablet under the tongue Daily. 90 each 3    desvenlafaxine (PRISTIQ) 100 MG 24 hr tablet Take 1 tablet by mouth Daily.      folic acid (FOLVITE) 1 MG tablet TAKE 1 TABLET BY MOUTH EVERY DAY 90 tablet 1    ipratropium-albuterol (DUO-NEB) 0.5-2.5 mg/3 ml nebulizer Take 3 mL by nebulization Every 4 (Four) Hours As Needed for Wheezing. 90 mL 0    mirtazapine (REMERON) 15 MG tablet Take 1 tablet by mouth Daily.      Nebulizers (Easy Air Compressor Nebulizer) misc See Admin Instructions.      Stiolto Respimat 2.5-2.5 MCG/ACT aerosol solution inhaler Inhale 2 puffs.      torsemide (DEMADEX) 10 MG tablet Take 1 tablet by mouth Daily As Needed (leg swelling).      valsartan (DIOVAN) 40 MG tablet Take 2 tablets by mouth Daily.      [DISCONTINUED] atorvastatin (LIPITOR) 40 MG tablet TAKE 1 TABLET BY MOUTH EVERY DAY AT NIGHT 90 tablet 1    [DISCONTINUED] famotidine (PEPCID) 20 MG tablet TAKE 1 TABLET BY MOUTH TWICE A  tablet 1    [DISCONTINUED] gabapentin (NEURONTIN) 300 MG capsule Take 1 capsule by mouth Daily. 90 capsule 0    [DISCONTINUED] Jardiance 10 MG tablet tablet TAKE 1 TABLET BY MOUTH EVERY DAY 90 tablet 1     No current facility-administered medications on file prior to visit.       Results for orders placed or performed during the hospital encounter of 03/14/25   Adult Transthoracic Echo Complete w/ Color, Spectral and Contrast if necessary per protocol    Collection Time: 03/14/25 11:56 AM   Result Value Ref Range    LVIDd 4.8 cm    LVIDs 3.4 cm    IVSd 1.00 cm    LVPWd 1.00 cm    IVS/LVPW 1.00 cm    LVOT area 3.0 cm2    LVOT diam 1.95 cm    SVi (LVOT) 24.4 ml/m2    MV E max bishop 48.3 cm/sec    MV A max bishop 81.9 cm/sec    MV E/A 0.59     LA ESV Index (BP) 37.0 ml/m2    Med Peak E' Bishop  3.9 cm/sec    Lat Peak E' Bishop 5.6 cm/sec    Avg E/e' ratio 10.17     SV(LVOT) 46.2 ml    RV Base 3.3 cm    RV Mid 2.5 cm    RV Length 6.8 cm    TAPSE (>1.6) 1.92 cm    RV S' 14.8 cm/sec    LA dimension (2D)  3.9 cm    LV V1 max 71.8 cm/sec    LV V1 max PG 2.07 mmHg    LV V1 mean PG 1.03 mmHg    LV V1 VTI 15.5 cm    Ao pk bishop 126.7 cm/sec    Ao max PG 6.4 mmHg    Ao mean PG 3.5 mmHg    Ao V2 VTI 24.6 cm    CHRISTIE(I,D) 1.88 cm2    Dimensionless Index 0.63 (DI)    MV max PG 3.5 mmHg    MV mean PG 1.60 mmHg    MV V2 VTI 19.3 cm    MV P1/2t 100.0 msec    MVA(VTI) 2.39 cm2    MV dec slope 173.5 cm/sec2    PA V2 max 88.5 cm/sec    PA acc time 0.08 sec    Ao root diam 3.0 cm    Ascending aorta 3.2 cm    EF(MOD-bp) 58.0 %    AI P1/2t 694 msec    MVA(P1/2t) 2.20 cm2    BH CV VAS BP RIGHT /100 mmHg    LV GLOBAL STRAIN  -20.1 %       PE    Physical Exam  Vitals reviewed.   Constitutional:       General: She is not in acute distress.     Appearance: Normal appearance. She is well-developed. She is not ill-appearing or diaphoretic.   HENT:      Head: Normocephalic and atraumatic.   Eyes:      Extraocular Movements: Extraocular movements intact.      Conjunctiva/sclera: Conjunctivae normal.   Cardiovascular:      Rate and Rhythm: Normal rate and regular rhythm.      Heart sounds: Normal heart sounds.   Pulmonary:      Effort: Pulmonary effort is normal. No respiratory distress.      Breath sounds: Decreased breath sounds present.   Musculoskeletal:         General: Normal range of motion.      Cervical back: Normal range of motion.      Right lower leg: No edema.      Left lower leg: No edema.   Skin:     General: Skin is warm.      Findings: No erythema or rash.   Neurological:      General: No focal deficit present.      Mental Status: She is alert.   Psychiatric:         Attention and Perception: She is attentive.         Mood and Affect: Mood normal.         Speech: Speech normal.         Behavior: Behavior normal.  Behavior is cooperative.         Thought Content: Thought content normal.         Judgment: Judgment normal.           A/P    Diagnoses and all orders for this visit:    1. Neuropathy (Primary)  -     gabapentin (NEURONTIN) 300 MG capsule; Take 1 capsule by mouth Daily.  Dispense: 90 capsule; Refill: 0  Doing well with medication.  No adverse effects.  Will update UDS.  Refill sent to pharmacy.  Return in 3 months.    2. Primary hypertension  Stable, well-controlled.  Compliant on medication.    3. Mixed hyperlipidemia  -     atorvastatin (LIPITOR) 40 MG tablet; Take 1 tablet by mouth every night at bedtime.  Dispense: 90 tablet; Refill: 1    4. Prediabetes  -     POC Glycosylated Hemoglobin (Hb A1C)  -     empagliflozin (Jardiance) 10 MG tablet tablet; Take 1 tablet by mouth Daily.  Dispense: 90 tablet; Refill: 1  Hemoglobin A1c is 6.2%.  On Jardiance for her CHF and CKD.    Patient has been erroneously marked as diabetic. Based on the available clinical information, she does not have diabetes and should therefore be excluded from diabetic health maintenance and quality measures for the remainder of the reporting period.    5. Chronic systolic heart failure (CMS/HCC)  -     empagliflozin (Jardiance) 10 MG tablet tablet; Take 1 tablet by mouth Daily.  Dispense: 90 tablet; Refill: 1    6. Stage 4 chronic kidney disease  -     empagliflozin (Jardiance) 10 MG tablet tablet; Take 1 tablet by mouth Daily.  Dispense: 90 tablet; Refill: 1    7. Epigastric abdominal tenderness without rebound tenderness  -     famotidine (PEPCID) 20 MG tablet; Take 1 tablet by mouth 2 (Two) Times a Day.  Dispense: 180 tablet; Refill: 1    8. Encounter for therapeutic drug level monitoring  -     Compliance Drug Analysis, Ur - Urine, Clean Catch    9. Other specified disorders of bone density and structure, other site  -     DEXA Bone Density Axial; Future         Plan of care reviewed with patient at the conclusion of today's visit.  Education was provided regarding diagnosis, management and any prescribed or recommended OTC medications.  Patient verbalizes understanding of and agreement with management plan.    Dictated Utilizing Dragon Dictation     Please note that portions of this note were completed with a voice recognition program.     Part of this note may be an electronic transcription/translation of spoken language to printed text using the Dragon Dictation System.    Return in about 3 months (around 6/30/2025) for Annual physical.     Sandee Keen PA-C

## 2025-03-25 LAB — DRUGS UR: NORMAL

## 2025-04-24 DIAGNOSIS — I50.22 CHRONIC SYSTOLIC HEART FAILURE: ICD-10-CM

## 2025-04-24 RX ORDER — VALSARTAN 80 MG/1
80 TABLET ORAL DAILY
Qty: 90 TABLET | Refills: 3 | Status: SHIPPED | OUTPATIENT
Start: 2025-04-24

## 2025-06-04 ENCOUNTER — OFFICE VISIT (OUTPATIENT)
Dept: CARDIOLOGY | Facility: CLINIC | Age: 60
End: 2025-06-04
Payer: COMMERCIAL

## 2025-06-04 VITALS
HEIGHT: 65 IN | WEIGHT: 171 LBS | OXYGEN SATURATION: 96 % | SYSTOLIC BLOOD PRESSURE: 122 MMHG | BODY MASS INDEX: 28.49 KG/M2 | DIASTOLIC BLOOD PRESSURE: 80 MMHG | HEART RATE: 80 BPM

## 2025-06-04 DIAGNOSIS — I42.9 CARDIOMYOPATHY, UNSPECIFIED TYPE: ICD-10-CM

## 2025-06-04 DIAGNOSIS — N18.4 CKD (CHRONIC KIDNEY DISEASE) STAGE 4, GFR 15-29 ML/MIN: ICD-10-CM

## 2025-06-04 DIAGNOSIS — Z72.0 TOBACCO ABUSE: ICD-10-CM

## 2025-06-04 DIAGNOSIS — I50.32 HEART FAILURE WITH RECOVERED EJECTION FRACTION (HFRECEF): Primary | ICD-10-CM

## 2025-06-04 DIAGNOSIS — I42.8 NICM (NONISCHEMIC CARDIOMYOPATHY): ICD-10-CM

## 2025-06-04 DIAGNOSIS — I15.9 SECONDARY HYPERTENSION: ICD-10-CM

## 2025-06-04 RX ORDER — NIFEDIPINE 30 MG/1
30 TABLET, EXTENDED RELEASE ORAL DAILY
COMMUNITY
Start: 2025-05-14

## 2025-06-04 NOTE — PROGRESS NOTES
Established Patient Office Visit    Patient Name: Nae Sanchez  : 1965   MRN: [id number]   Care Team: Patient Care Team:  Sandee Keen PA-C as PCP - General (Physician Assistant)  Petey Leigh MD as Cardiologist (Cardiology)  Joshua Brown APRN (Nurse Practitioner)  Cecy Coles MD (Nephrology)    Chief Complaint   Patient presents with    Primary hypertension     HPI: Nae Sanchez is a 60 y.o. female with a history of relapse of tobacco use, COPD, and nonischemic, dilated cardiomyopathy with normalized EF, resolved apical thrombus who presents today for routine follow-up.     I had last seen her in the office in February and since that time she underwent echocardiogram which showed normal systolic function with normal GLS and grade 1 diastolic dysfunction.  She does have a sigmoid shaped septum with no evidence of outflow tract obstruction.    She has not had any recurrence of leg swelling and has not regularly been taking torsemide.  She does have dyspnea on exertion which is fairly stable.  She continues to smoke about 1/2 pack/day.  She did have a visit with her nephrologist in mid March with addition of nifedipine for uncontrolled hypertension.  She also has had follow-up with her sleep clinic and continues on BiPAP therapy for BARBARA.    Subjective   Review of Systems   Constitutional:  Negative for activity change and fatigue.   Respiratory:  Positive for shortness of breath.    Cardiovascular:  Negative for chest pain, palpitations and leg swelling.   Musculoskeletal:  Positive for arthralgias and joint swelling.     Social History     Tobacco Use   Smoking Status Every Day    Current packs/day: 0.50    Average packs/day: 1 pack/day for 43.9 years (43.0 ttl pk-yrs)    Types: Cigarettes    Start date: 1982    Last attempt to quit: 2022    Passive exposure: Never   Smokeless Tobacco Never      No Known Allergies      Current Outpatient Medications:      amphetamine-dextroamphetamine XR (ADDERALL XR) 30 MG 24 hr capsule, Take 1 capsule by mouth Every Morning, Disp: , Rfl:     aspirin 81 MG EC tablet, Take 1 tablet by mouth Daily., Disp: 30 tablet, Rfl: 6    atorvastatin (LIPITOR) 40 MG tablet, Take 1 tablet by mouth every night at bedtime., Disp: 90 tablet, Rfl: 1    Calcium Carbonate-Vitamin D (Oyster Shell Calcium/D) 500-5 MG-MCG tablet, Take 1 tablet by mouth Daily., Disp: , Rfl:     carvedilol (COREG) 12.5 MG tablet, Take 2 tablets by mouth 2 (Two) Times a Day With Meals for 360 days., Disp: , Rfl:     Cholecalciferol 50 MCG (2000 UT) capsule, Take 1 capsule by mouth Daily., Disp: , Rfl:     Cyanocobalamin (Vitamin B-12) 1000 MCG sublingual tablet, Place 1 tablet under the tongue Daily., Disp: 90 each, Rfl: 3    desvenlafaxine (PRISTIQ) 100 MG 24 hr tablet, Take 1 tablet by mouth Daily., Disp: , Rfl:     empagliflozin (Jardiance) 10 MG tablet tablet, Take 1 tablet by mouth Daily., Disp: 90 tablet, Rfl: 1    famotidine (PEPCID) 20 MG tablet, Take 1 tablet by mouth 2 (Two) Times a Day., Disp: 180 tablet, Rfl: 1    folic acid (FOLVITE) 1 MG tablet, TAKE 1 TABLET BY MOUTH EVERY DAY, Disp: 90 tablet, Rfl: 1    gabapentin (NEURONTIN) 300 MG capsule, Take 1 capsule by mouth Daily., Disp: 90 capsule, Rfl: 0    ipratropium-albuterol (DUO-NEB) 0.5-2.5 mg/3 ml nebulizer, Take 3 mL by nebulization Every 4 (Four) Hours As Needed for Wheezing., Disp: 90 mL, Rfl: 0    mirtazapine (REMERON) 15 MG tablet, Take 1 tablet by mouth Daily., Disp: , Rfl:     Nebulizers (Easy Air Compressor Nebulizer) misc, See Admin Instructions., Disp: , Rfl:     NIFEdipine XL (PROCARDIA XL) 30 MG 24 hr tablet, Take 1 tablet by mouth Daily., Disp: , Rfl:     Stiolto Respimat 2.5-2.5 MCG/ACT aerosol solution inhaler, Inhale 2 puffs., Disp: , Rfl:     valsartan (DIOVAN) 80 MG tablet, Take 1 tablet by mouth Daily., Disp: 90 tablet, Rfl: 3    nicotine (NICODERM CQ) 7 MG/24HR patch, Place 1 patch on the  "skin as directed by provider Daily., Disp: 14 each, Rfl: 5    Objective     Vitals:    06/04/25 1109 06/04/25 1133   BP: 142/80 122/80   BP Location: Left arm    Patient Position: Sitting    Pulse: 80    SpO2: 96%    Weight: 77.6 kg (171 lb)    Height: 165.1 cm (65\")    Body mass index is 28.46 kg/m².  Gen: well developed, white female, sitting up on exam table  HEENT: MMM, sclera anicteric, conjunctiva normal  CV: regular rate, regular rhythm, no murmurs or rubs, normal S1, S2. 2+ radial pulses  Pulm: RA, normal work of breathing, no wheezes, rales, rhonchi  Ext: normal bulk for age, normal tone, trace edema  Neuro: alert, oriented, face symmetrical, moving all extremities well  Psych: normal mood, appropriate affect    RESULTS:     Results for orders placed during the hospital encounter of 03/14/25    Adult Transthoracic Echo Complete w/ Color, Spectral and Contrast if necessary per protocol    Interpretation Summary    Left ventricular systolic function is normal. Calculated left ventricular EF = 58% Normal global longitudinal LV strain (GLS) = -20.1%. Normal left ventricular cavity size noted. Left ventricular wall thickness is consistent with hypertrophy. Sigmoid-shaped ventricular septum is present. All left ventricular wall segments contract normally. Left ventricular diastolic function is consistent with (grade I) impaired relaxation.    Compared to TTEs from 2022, EF has normalized.      [facility] 9/1/2022  FINAL IMPRESSION:  Angulated/tortuous proximal LCx with nonocclusive (up to 50%) plaque with normal IFR of 1.0.  Angiographically near normal left main, the LAD and the dominant RCA.  Dilated, probably nonischemic cardiomyopathy with severe left ventricular systolic dysfunction, estimated ejection fraction 30%.  Left ventricular apical filling defect highly suggestive of apical thrombus.    12/6/22 - Transthoracic Echo Complete W/ Cont if Necessary Per Protocol      The left ventricular cavity is mildly " dilated.    Left ventricular systolic function is mildly decreased. Calculated left ventricular EF = 41%    Left ventricular diastolic function is consistent with (grade Ia w/high LAP) impaired relaxation.    EF is improved from previous.    No LV thrombus seen with LV opacification agent    8/30/22 - TTE  Estimated left ventricular EF = 25% Left ventricular ejection fraction appears to be 21 - 25%. Left ventricular systolic function is severely decreased.  There is a small left pleural effusion.  The following left ventricular wall segments are hypokinetic: mid anterior, apical anterior, basal anterolateral, mid anterolateral, apical lateral, basal inferolateral, mid inferolateral, mid anteroseptal and basal anterior. The following left ventricular wall segments are akinetic: apical inferior, basal inferior, mid inferior, apical septal, basal inferoseptal, mid inferoseptal and apex.  The left ventricular cavity is mild to moderately dilated.  The findings are consistent with dilated cardiomyopathy.  Left ventricular diastolic function is consistent with (grade I) impaired relaxation.  Left atrial volume is mildly increased.  Estimated right ventricular systolic pressure from tricuspid regurgitation is normal (<35 mmHg).  Normal right ventricular cavity size, wall thickness and septal motion noted with moderately reduced right ventricular systolic function.  No evidence of left ventricular thrombus or mass present.  No evidence of pulmonary hypertension is present.  Mild to moderate aortic valve regurgitation is present.    Labs:  Lab Results   Component Value Date    CREATININE 1.72 (H) 02/17/2025    BUN 15 02/17/2025     02/17/2025    K 4.9 02/17/2025     02/17/2025    CO2 32.0 (H) 02/17/2025     Renal function panel   5/20/25 - Cr 2.35, BUN 33, K 5.0  10/05/23 / Cr 1.87, BUN 27, K 4.7  07/07/23 - Cr 2.23, BUN 22, K 4.2  01/12/23 - Cr 1.61, potassium 5.1, total CO2 31    Lab Results   Component Value  Date    WBC 13.20 (H) 05/30/2025    HGB 13.3 05/30/2025    HCT 41.8 05/30/2025    MCV 90 05/30/2025     (H) 05/30/2025     Lab Results   Component Value Date    HGBA1C 6.2 (A) 03/19/2025     Lab Results   Component Value Date    CHOL 139 06/28/2024    TRIG 106 06/28/2024    HDL 47 06/28/2024    LDL 73 06/28/2024     Most recent PCP note, imaging tests, and labs reviewed.    Procedures    Assessment & Plan       ICD-10-CM ICD-9-CM   1. 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 Cardiomyopathy, unspecified type  I42.9 425.4   3. Secondary hypertension  I15.9 405.99   4. NICM (nonischemic cardiomyopathy)  I42.8 425.4   5. CKD (chronic kidney disease) stage 4, GFR 15-29 ml/min  N18.4 585.4   6. Tobacco abuse  Z72.0 305.1       History of heart failure with reduced ejection fraction, EF now recovered  Nonischemic cardiomyopathy  LV thrombus, resolved   - GDMT with carvedilol, valsartan, empagliflozin    - Would recommend discontinuation of torsemide   - BP above goal, will try to increase valsartan hand for a week and recheck BMP next week   - Recheck TTE    Primary hypertension, acceptably controlled   - At goal on recheck   - No medication changes at this time    HLD   - continue statin    Stage III/IV CKD   - follows with nephrology at  (Dr. Cecy Coles)   - Last metabolic panel from 5/30 showed a creatinine of 2.35 and potassium of 5.0.  Prior to that I see a creatinine of 1.72 from February.   - With her recovered EF, it would be reasonable to decrease or hold ARB and/or SGLT2 inhibitor if this is recommended from a nephrology standpoint in the future    Tobacco abuse   - Amenable to a retrial of nicotine patches.  Counseled patient for 3 minutes on tobacco cessation and the benefits on progression of lung disease and the impact on her chronic dyspnea she had been warned against MAT by her PCP in the past    Return in about 6 months (around 12/4/2025).    KAMALJIT Connor  MD Lu, MS  06/04/25    [facility]  [address] [address] [phone]

## 2025-06-04 NOTE — LETTER
2025     Cecy Coles MD  [address] [facility]  [address] [place], [place] [id number]    Patient: Nae Sanchez   YOB: 1965   Date of Visit: 2025     Dear Cecy Coles MD:       Thank you for referring Nae Sanchez to me for evaluation. Below are the relevant portions of my assessment and plan of care.    If you have questions, please do not hesitate to call me. I look forward to following Nae along with you.         Sincerely,        Petey Leigh MD        CC: No Recipients    Petey Leigh MD  25 1142  Sign when Signing Visit  Established Patient Office Visit    Patient Name: Nae Sanchez  : 1965   MRN: [id number]   Care Team: Patient Care Team:  Sandee Keen PA-C as PCP - General (Physician Assistant)  Petey Leigh MD as Cardiologist (Cardiology)  Joshua Brwon APRN (Nurse Practitioner)  Cecy Coles MD (Nephrology)    Chief Complaint   Patient presents with   • Primary hypertension     HPI: Nae Sanchez is a 60 y.o. female with a history of relapse of tobacco use, COPD, and nonischemic, dilated cardiomyopathy with normalized EF, resolved apical thrombus who presents today for routine follow-up.     I had last seen her in the office in February and since that time she underwent echocardiogram which showed normal systolic function with normal GLS and grade 1 diastolic dysfunction.  She does have a sigmoid shaped septum with no evidence of outflow tract obstruction.    She has not had any recurrence of leg swelling and has not regularly been taking torsemide.  She does have dyspnea on exertion which is fairly stable.  She continues to smoke about 1/2 pack/day.  She did have a visit with her nephrologist in mid March with addition of nifedipine for uncontrolled hypertension.  She also has had follow-up with her sleep clinic and continues on BiPAP therapy for BARBARA.    Subjective   Review of Systems    Constitutional:  Negative for activity change and fatigue.   Respiratory:  Positive for shortness of breath.    Cardiovascular:  Negative for chest pain, palpitations and leg swelling.   Musculoskeletal:  Positive for arthralgias and joint swelling.     Social History     Tobacco Use   Smoking Status Every Day   • Current packs/day: 0.50   • Average packs/day: 1 pack/day for 43.9 years (43.0 ttl pk-yrs)   • Types: Cigarettes   • Start date: 1/1/1982   • Last attempt to quit: 8/29/2022   • Passive exposure: Never   Smokeless Tobacco Never      No Known Allergies      Current Outpatient Medications:   •  amphetamine-dextroamphetamine XR (ADDERALL XR) 30 MG 24 hr capsule, Take 1 capsule by mouth Every Morning, Disp: , Rfl:   •  aspirin 81 MG EC tablet, Take 1 tablet by mouth Daily., Disp: 30 tablet, Rfl: 6  •  atorvastatin (LIPITOR) 40 MG tablet, Take 1 tablet by mouth every night at bedtime., Disp: 90 tablet, Rfl: 1  •  Calcium Carbonate-Vitamin D (Oyster Shell Calcium/D) 500-5 MG-MCG tablet, Take 1 tablet by mouth Daily., Disp: , Rfl:   •  carvedilol (COREG) 12.5 MG tablet, Take 2 tablets by mouth 2 (Two) Times a Day With Meals for 360 days., Disp: , Rfl:   •  Cholecalciferol 50 MCG (2000 UT) capsule, Take 1 capsule by mouth Daily., Disp: , Rfl:   •  Cyanocobalamin (Vitamin B-12) 1000 MCG sublingual tablet, Place 1 tablet under the tongue Daily., Disp: 90 each, Rfl: 3  •  desvenlafaxine (PRISTIQ) 100 MG 24 hr tablet, Take 1 tablet by mouth Daily., Disp: , Rfl:   •  empagliflozin (Jardiance) 10 MG tablet tablet, Take 1 tablet by mouth Daily., Disp: 90 tablet, Rfl: 1  •  famotidine (PEPCID) 20 MG tablet, Take 1 tablet by mouth 2 (Two) Times a Day., Disp: 180 tablet, Rfl: 1  •  folic acid (FOLVITE) 1 MG tablet, TAKE 1 TABLET BY MOUTH EVERY DAY, Disp: 90 tablet, Rfl: 1  •  gabapentin (NEURONTIN) 300 MG capsule, Take 1 capsule by mouth Daily., Disp: 90 capsule, Rfl: 0  •  ipratropium-albuterol (DUO-NEB) 0.5-2.5 mg/3 ml  "nebulizer, Take 3 mL by nebulization Every 4 (Four) Hours As Needed for Wheezing., Disp: 90 mL, Rfl: 0  •  mirtazapine (REMERON) 15 MG tablet, Take 1 tablet by mouth Daily., Disp: , Rfl:   •  Nebulizers (Easy Air Compressor Nebulizer) misc, See Admin Instructions., Disp: , Rfl:   •  NIFEdipine XL (PROCARDIA XL) 30 MG 24 hr tablet, Take 1 tablet by mouth Daily., Disp: , Rfl:   •  Stiolto Respimat 2.5-2.5 MCG/ACT aerosol solution inhaler, Inhale 2 puffs., Disp: , Rfl:   •  valsartan (DIOVAN) 80 MG tablet, Take 1 tablet by mouth Daily., Disp: 90 tablet, Rfl: 3  •  nicotine (NICODERM CQ) 7 MG/24HR patch, Place 1 patch on the skin as directed by provider Daily., Disp: 14 each, Rfl: 5    Objective     Vitals:    06/04/25 1109 06/04/25 1133   BP: 142/80 122/80   BP Location: Left arm    Patient Position: Sitting    Pulse: 80    SpO2: 96%    Weight: 77.6 kg (171 lb)    Height: 165.1 cm (65\")    Body mass index is 28.46 kg/m².  Gen: well developed, white female, sitting up on exam table  HEENT: MMM, sclera anicteric, conjunctiva normal  CV: regular rate, regular rhythm, no murmurs or rubs, normal S1, S2. 2+ radial pulses  Pulm: RA, normal work of breathing, no wheezes, rales, rhonchi  Ext: normal bulk for age, normal tone, trace edema  Neuro: alert, oriented, face symmetrical, moving all extremities well  Psych: normal mood, appropriate affect    RESULTS:     Results for orders placed during the hospital encounter of 03/14/25    Adult Transthoracic Echo Complete w/ Color, Spectral and Contrast if necessary per protocol    Interpretation Summary  •  Left ventricular systolic function is normal. Calculated left ventricular EF = 58% Normal global longitudinal LV strain (GLS) = -20.1%. Normal left ventricular cavity size noted. Left ventricular wall thickness is consistent with hypertrophy. Sigmoid-shaped ventricular septum is present. All left ventricular wall segments contract normally. Left ventricular diastolic function is " consistent with (grade I) impaired relaxation.  •  Compared to TTEs from 2022, EF has normalized.      [facility] 9/1/2022  FINAL IMPRESSION:  Angulated/tortuous proximal LCx with nonocclusive (up to 50%) plaque with normal IFR of 1.0.  Angiographically near normal left main, the LAD and the dominant RCA.  Dilated, probably nonischemic cardiomyopathy with severe left ventricular systolic dysfunction, estimated ejection fraction 30%.  Left ventricular apical filling defect highly suggestive of apical thrombus.    12/6/22 - Transthoracic Echo Complete W/ Cont if Necessary Per Protocol    •  The left ventricular cavity is mildly dilated.  •  Left ventricular systolic function is mildly decreased. Calculated left ventricular EF = 41%  •  Left ventricular diastolic function is consistent with (grade Ia w/high LAP) impaired relaxation.  •  EF is improved from previous.  •  No LV thrombus seen with LV opacification agent    8/30/22 - TTE  Estimated left ventricular EF = 25% Left ventricular ejection fraction appears to be 21 - 25%. Left ventricular systolic function is severely decreased.  There is a small left pleural effusion.  The following left ventricular wall segments are hypokinetic: mid anterior, apical anterior, basal anterolateral, mid anterolateral, apical lateral, basal inferolateral, mid inferolateral, mid anteroseptal and basal anterior. The following left ventricular wall segments are akinetic: apical inferior, basal inferior, mid inferior, apical septal, basal inferoseptal, mid inferoseptal and apex.  The left ventricular cavity is mild to moderately dilated.  The findings are consistent with dilated cardiomyopathy.  Left ventricular diastolic function is consistent with (grade I) impaired relaxation.  Left atrial volume is mildly increased.  Estimated right ventricular systolic pressure from tricuspid regurgitation is normal (<35 mmHg).  Normal right ventricular cavity size, wall thickness and septal motion noted  with moderately reduced right ventricular systolic function.  No evidence of left ventricular thrombus or mass present.  No evidence of pulmonary hypertension is present.  Mild to moderate aortic valve regurgitation is present.    Labs:  Lab Results   Component Value Date    CREATININE 1.72 (H) 02/17/2025    BUN 15 02/17/2025     02/17/2025    K 4.9 02/17/2025     02/17/2025    CO2 32.0 (H) 02/17/2025     Renal function panel   5/20/25 - Cr 2.35, BUN 33, K 5.0  10/05/23 / Cr 1.87, BUN 27, K 4.7  07/07/23 - Cr 2.23, BUN 22, K 4.2  01/12/23 - Cr 1.61, potassium 5.1, total CO2 31    Lab Results   Component Value Date    WBC 13.20 (H) 05/30/2025    HGB 13.3 05/30/2025    HCT 41.8 05/30/2025    MCV 90 05/30/2025     (H) 05/30/2025     Lab Results   Component Value Date    HGBA1C 6.2 (A) 03/19/2025     Lab Results   Component Value Date    CHOL 139 06/28/2024    TRIG 106 06/28/2024    HDL 47 06/28/2024    LDL 73 06/28/2024     Most recent PCP note, imaging tests, and labs reviewed.    Procedures    Assessment & Plan       ICD-10-CM ICD-9-CM   1. 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 Cardiomyopathy, unspecified type  I42.9 425.4   3. Secondary hypertension  I15.9 405.99   4. NICM (nonischemic cardiomyopathy)  I42.8 425.4   5. CKD (chronic kidney disease) stage 4, GFR 15-29 ml/min  N18.4 585.4   6. Tobacco abuse  Z72.0 305.1       History of heart failure with reduced ejection fraction, EF now recovered  Nonischemic cardiomyopathy  LV thrombus, resolved   - GDMT with carvedilol, valsartan, empagliflozin    - Would recommend discontinuation of torsemide   - BP above goal, will try to increase valsartan hand for a week and recheck BMP next week   - Recheck TTE    Primary hypertension, acceptably controlled   - At goal on recheck   - No medication changes at this time    HLD   - continue statin    Stage III/IV CKD   - follows with nephrology at  (Dr. Sam  Sanket)   - Last metabolic panel from 5/30 showed a creatinine of 2.35 and potassium of 5.0.  Prior to that I see a creatinine of 1.72 from February.   - With her recovered EF, it would be reasonable to decrease or hold ARB and/or SGLT2 inhibitor if this is recommended from a nephrology standpoint in the future    Tobacco abuse   - Amenable to a retrial of nicotine patches.  Counseled patient for 3 minutes on tobacco cessation and the benefits on progression of lung disease and the impact on her chronic dyspnea she had been warned against MAT by her PCP in the past    Return in about 6 months (around 12/4/2025).    KAMALJIT Leigh MD, MS  06/04/25    [facility]  [address] [place] [address] [phone]

## 2025-07-10 ENCOUNTER — HOSPITAL ENCOUNTER (OUTPATIENT)
Dept: BONE DENSITY | Facility: HOSPITAL | Age: 60
Discharge: HOME | End: 2025-07-10
Admitting: PHYSICIAN ASSISTANT
Payer: COMMERCIAL

## 2025-07-10 DIAGNOSIS — M85.88 OTHER SPECIFIED DISORDERS OF BONE DENSITY AND STRUCTURE, OTHER SITE: ICD-10-CM

## 2025-07-10 PROCEDURE — 77080 DXA BONE DENSITY AXIAL: CPT

## 2025-07-14 ENCOUNTER — RESULTS FOLLOW-UP (OUTPATIENT)
Dept: FAMILY MEDICINE CLINIC | Facility: CLINIC | Age: 60
End: 2025-07-14
Payer: COMMERCIAL

## (undated) DEVICE — Device: Brand: OMNIWIRE PRESSURE GUIDE WIRE

## (undated) DEVICE — SPNG ENDO BEDSIDE TUB ENZYM

## (undated) DEVICE — INTRO ACCSR BLNT TP

## (undated) DEVICE — CATH DIAG EXPO .045 FL3  5F 100CM

## (undated) DEVICE — LUBE GEL ENDOGLIDE 1.1OZ

## (undated) DEVICE — PK CATH CARD 10

## (undated) DEVICE — HYBRID CO2 TUBING/CAP SET FOR OLYMPUS® SCOPES & CO2 SOURCE: Brand: ERBE

## (undated) DEVICE — KT ORCA ORCAPOD DISP STRL

## (undated) DEVICE — MODEL AT P65, P/N 701554-001KIT CONTENTS: HAND CONTROLLER, 3-WAY HIGH-PRESSURE STOPCOCK WITH ROTATING END AND PREMIUM HIGH-PRESSURE TUBING: Brand: ANGIOTOUCH® KIT

## (undated) DEVICE — NDL ANGIOGR ADV THN SMOTH SGLWALL 21G 1.5

## (undated) DEVICE — DEV COMP RAD PRELUDESYNC 24CM

## (undated) DEVICE — GRADUATE CONTN 1000ML

## (undated) DEVICE — SOL IRR H2O BTL 1000ML STRL

## (undated) DEVICE — SYR LUERLOK 50ML

## (undated) DEVICE — GUIDE CATHETER: Brand: MACH1™

## (undated) DEVICE — MODEL BT2000 P/N 700287-012KIT CONTENTS: MANIFOLD WITH SALINE AND CONTRAST PORTS, SALINE TUBING WITH SPIKE AND HAND SYRINGE, TRANSDUCER: Brand: BT2000 AUTOMATED MANIFOLD KIT

## (undated) DEVICE — INTRO SHEATH PRELUDE IDEAL SPRNG COIL 021 6F 23X80CM

## (undated) DEVICE — RADIFOCUS GLIDEWIRE: Brand: GLIDEWIRE

## (undated) DEVICE — TUBING, SUCTION, 1/4" X 10', STRAIGHT: Brand: MEDLINE

## (undated) DEVICE — KT VLV HEMO MAP ACC PLS LG/BORE MTL/INTRO W/TORQ/DEV

## (undated) DEVICE — CATH DIAG EXPO M/ PK 5F FL4/FR4 PIG